# Patient Record
Sex: FEMALE | Race: WHITE | Employment: OTHER | URBAN - METROPOLITAN AREA
[De-identification: names, ages, dates, MRNs, and addresses within clinical notes are randomized per-mention and may not be internally consistent; named-entity substitution may affect disease eponyms.]

---

## 2021-06-03 ENCOUNTER — OFFICE VISIT (OUTPATIENT)
Dept: ONCOLOGY | Age: 66
End: 2021-06-03
Payer: MEDICARE

## 2021-06-03 ENCOUNTER — DOCUMENTATION ONLY (OUTPATIENT)
Dept: ONCOLOGY | Age: 66
End: 2021-06-03

## 2021-06-03 VITALS
SYSTOLIC BLOOD PRESSURE: 100 MMHG | HEART RATE: 82 BPM | DIASTOLIC BLOOD PRESSURE: 67 MMHG | WEIGHT: 152.4 LBS | OXYGEN SATURATION: 98 % | TEMPERATURE: 97 F

## 2021-06-03 DIAGNOSIS — C50.811 MALIGNANT NEOPLASM OF OVERLAPPING SITES OF RIGHT BREAST IN FEMALE, ESTROGEN RECEPTOR POSITIVE (HCC): Primary | ICD-10-CM

## 2021-06-03 DIAGNOSIS — Z17.0 MALIGNANT NEOPLASM OF OVERLAPPING SITES OF RIGHT BREAST IN FEMALE, ESTROGEN RECEPTOR POSITIVE (HCC): Primary | ICD-10-CM

## 2021-06-03 DIAGNOSIS — Z95.828 PORT-A-CATH IN PLACE: Primary | ICD-10-CM

## 2021-06-03 PROCEDURE — 1090F PRES/ABSN URINE INCON ASSESS: CPT | Performed by: INTERNAL MEDICINE

## 2021-06-03 PROCEDURE — G9711 PT HX TOT COL OR COLON CA: HCPCS | Performed by: INTERNAL MEDICINE

## 2021-06-03 PROCEDURE — G8536 NO DOC ELDER MAL SCRN: HCPCS | Performed by: INTERNAL MEDICINE

## 2021-06-03 PROCEDURE — G0463 HOSPITAL OUTPT CLINIC VISIT: HCPCS | Performed by: INTERNAL MEDICINE

## 2021-06-03 PROCEDURE — G8432 DEP SCR NOT DOC, RNG: HCPCS | Performed by: INTERNAL MEDICINE

## 2021-06-03 PROCEDURE — G8427 DOCREV CUR MEDS BY ELIG CLIN: HCPCS | Performed by: INTERNAL MEDICINE

## 2021-06-03 PROCEDURE — G8400 PT W/DXA NO RESULTS DOC: HCPCS | Performed by: INTERNAL MEDICINE

## 2021-06-03 PROCEDURE — 99205 OFFICE O/P NEW HI 60 MIN: CPT | Performed by: INTERNAL MEDICINE

## 2021-06-03 PROCEDURE — 1101F PT FALLS ASSESS-DOCD LE1/YR: CPT | Performed by: INTERNAL MEDICINE

## 2021-06-03 PROCEDURE — G8421 BMI NOT CALCULATED: HCPCS | Performed by: INTERNAL MEDICINE

## 2021-06-03 RX ORDER — SERTRALINE HYDROCHLORIDE 50 MG/1
50 TABLET, FILM COATED ORAL DAILY
COMMUNITY
End: 2021-09-30

## 2021-06-03 RX ORDER — ONDANSETRON 4 MG/1
4-8 TABLET, ORALLY DISINTEGRATING ORAL
Qty: 60 TABLET | Refills: 1 | Status: SHIPPED | OUTPATIENT
Start: 2021-06-03 | End: 2021-09-30

## 2021-06-03 RX ORDER — PROCHLORPERAZINE MALEATE 5 MG
TABLET ORAL
Qty: 30 TABLET | Refills: 1 | Status: SHIPPED | OUTPATIENT
Start: 2021-06-03 | End: 2021-09-30

## 2021-06-03 RX ORDER — LIDOCAINE AND PRILOCAINE 25; 25 MG/G; MG/G
CREAM TOPICAL AS NEEDED
Qty: 30 G | Refills: 0 | Status: SHIPPED | OUTPATIENT
Start: 2021-06-03 | End: 2021-09-30

## 2021-06-03 NOTE — PROGRESS NOTES
6/3/2021 Opportunity to meet with patient during new patient visit. Patient prefers to be called Luz. Encouraged patient to [facility] in [facility], reminded patient that [facility] [employer] are read M-F 8:30-5 by RN and were for non emergency usage. Patient is interested in the [employer], but understands that this is not available at [facility]. That our office uses [employer]. Provided education regarding this system, along with pricing information. Understands that cost of Cold Cap is provided by [facility]. Explained that as she is interested, RN would determine her cap size, and complete paperwork at her office visit today. Patient fitted with Small cap with Small cover. MADISON explained to patient that more information can be obtained by visiting [employer] and watching educational videos, or by calling Lexi directly at (P#[phone]). Patient was also given booklets by William Muñoz LCSW regarding Lexi.

## 2021-06-03 NOTE — PROGRESS NOTES
[facility] [place] at [place] [address] [address], [place]: [phone]  F: [phone]    Reason for Visit:   Otto Dodson is a 77 y.o. female who is seen in consultation at the request of Dr. Emmie Zimmerman for evaluation of breast cancer. Treatment History:   Breast biopsy  Lumpectomy 4/21/21      STAGE:  2 T2N0 ER+ HER2 +  MY RISK negative    History of Present Illness:     Pt seen today for office consult for new breast cancer ER+ HER2+ post lumpectomy. All records at [address] [place] [address] Rd. Initially had abnormal mammo. Here to discuss adjuvant chemo. Hx of colon cancer 2008  Hx of cervical cancer 1983      Pt is healthy overall. No fevers/ chills/ chest pain/ SOB/ nausea/ vomiting/diarrhea/ pain/fatigue    Still having issues with lumpectomy. Did interior design. Pt is interested in integrative oncology. Timing of chemo. Past Medical History:   Diagnosis Date    Cervical cancer ([facility] [address] 1983    Colon cancer ([facility] 2008      History reviewed. No pertinent surgical history. Social History     Tobacco Use    Smoking status: Never Smoker    Smokeless tobacco: Never Used   Substance Use Topics    Alcohol use: Never      Family History   Problem Relation Age of Onset    Cancer Mother     Breast Cancer Mother     Cancer Father     Melanoma Father     Breast Cancer Sister     Colon Cancer Brother     Prostate Cancer Brother      Current Outpatient Medications   Medication Sig    sertraline (ZOLOFT) 50 mg tablet Take 50 mg by mouth daily. No current facility-administered medications for this visit. Allergies   Allergen Reactions    Cipro [Ciprofloxacin Hcl] Nausea and Vomiting        A complete review of systems was obtained, negative except as described above and as reported on ROS sheet scanned into system.      Physical Exam:     Visit Vitals  /67 (BP 1 Location: Left upper arm, BP Patient Position: Sitting)   Pulse 82   Temp 97 °F (36.1 °C) (Temporal)   Wt 152 lb 6.4 oz (69.1 kg)   SpO2 98%     ECOG PS: 0  General: No distress  Eyes: PERRLA, anicteric sclerae  HENT: Atraumatic, wearing mask  Neck: Supple  Respiratory: CTAB, normal respiratory effort  CV: Normal rate, regular rhythm, no murmurs, no peripheral edema  GI: Soft, nontender, nondistended, no masses  MS: Normal gait and station. Digits without clubbing or cyanosis. Skin: No rashes, ecchymoses, or petechiae. Normal temperature, turgor, and texture. Psych: Alert, oriented, appropriate affect, normal judgment/insight  Neuro non focal  Breast RIGHT lumpectomy scar healing with some fullness at site. Results:   No results found for: WBC, HGB, HCT, PLT, MCV, ANEU, HGBPOC, HCTPOC, HGBEXT, HCTEXT, PLTEXT, HGBEXT, HCTEXT, PLTEXT  No results found for: NA, K, CL, CO2, GLU, BUN, CREA, GFRAA, GFRNA, CA, NAPOC, KPOCT, CLPOC, GLUCPOC, IBUN, CREAPOC, ICAI  No results found for: TBILI, ALT, AP, TP, ALB, GLOB      Records reviewed and summarized above. Pathology report(s) reviewed above. Radiology report(s) reviewed above. Assessment:/PLAN     1)  stage 2 RIGHT breast cancer ER+ Her2+ post lumpectomy 4/21/21. All records at [facility]. No records in our system. Records and history reviewed with pt today. Reviewed path and data specifically with pt. Discussed dx, stage and treatment of breast cancer. Pt is still seeing surgery for lumpectomy scar. Discussed adjuvant chemo and hormonal therapy. Pt saw [facility] Dr Jono Thomas. Most interested in taxol/ herceptin weekly + perjeta. Had ECHO and good at [facility]. Set up chemo. Has port. Will need labs at chemo. Wants cold cap          2) hx of colon cancer/ cervical cancer. Did not have chemo. Surveillance. 3) Psychosocial.  Mood good. Coping well. Has good family support. Fu here in a week at chemo. Call if questions    I appreciate the opportunity to participate in Ms. Mila Stacy's care.     Signed By: Francisco Javier Schuster DO

## 2021-06-03 NOTE — PROGRESS NOTES
Pharmacy Note- Chemotherapy Education    Maxine Murillo is a  77 y. o.female  diagnosed with breast cancer here today for  chemotherapy counseling and consent. Ms. Kareen Lucio is being treated with PACLitaxel, trastuzumab +/- pertuzumab. Provided education on PACLItaxel, trastuzumab and premedications - dexamethasone, diphenhydramine and famotidine. Discussed option for SQ and IV administration of HER2 directed therapy and patient is interested in SQ administration if insurance will cover. Side effects of chemotherapy reviewed included s/s infection, anemia, appetite changes, thromboyctopenia, fatigue, hair loss/alopecia, bone pain, skin and nail changes, diarrhea/constipation, infusion reactions and peripheral neuropathy. Patient given ways to manage these side effects and when to contact office. [employer] Handout of medications provided to patient. Ms. Kareen Lucio verbalized understanding of the information presented and all of the patient's questions were answered.     Duncan Crisostomo, PharmD, BCPS, BCOP    For Pharmacy Admin Tracking Only     CPA in place: No   Recommendation Provided To: Patient/Caregiver: 1 via In person     Total # of Interventions Recommended: 1   Total # of Interventions Accepted: 1   Intervention Accepted By: Patient/Caregiver: 1   Time Spent (min): 30

## 2021-06-03 NOTE — PROGRESS NOTES
Weekly Taxol/Herceptin orders entered into [place] using [employer] to start in about a week. Anti-emetics and EMLA sent to pharmacy on file.

## 2021-06-03 NOTE — PROGRESS NOTES
Oncology Social Work  Psychosocial Assessment    Reason for Assessment:      [] Social Work Referral [x] Initial Assessment [] New Diagnosis [] Other    Advance Care Planning:  Patient does not have an advanced medical directive, and did not express interest in completing one today. Sources of Information:    [x]Patient  []Family  []Staff  []Medical Record     Mental Status:    [x]Alert  []Lethargic  []Unresponsive   [] Unable to assess   Oriented to:  [x]Person  [x]Place  [x]Time  [x]Situation      Relationship Status:  []Single  [x]  []Significant Other/Life Partner  []  []  []    Living Circumstances:  []Lives Alone  [x]Family/Significant Other in Household  []Roommates  []Children in the Home  []Paid Caregivers  []Assisted Living Facility/Group Home  []Skilled [address]  []Homeless  []Incarcerated  []Environmental/Care Concerns  []Other:    Employment Status:  []Employed Full-time []Employed Part-time []Homemaker  [x] Retired [] Short-Term Disability [] [facility]  [] Unemployed     Barriers to Learning:    []Language  []Developmental  []Cognitive  []Altered Mental Status  []Visual/Hearing Impairment  []Unable to Read/Write  []Motivational  [] Challenges Understanding Medical Jargon [x]No Barriers Identified      Financial/Legal Concerns:    []Uninsured  []Limited Income/Resources  []Non-Citizen  []Food Insecurity [x]No Concerns Identified   []Other:    Protestant/Spiritual/Existential:  Does patient have any spiritual or Judaism beliefs? [x] Yes [] No  Is the patient involved in a spiritual, enma or Judaism community? [] Yes [x] No  Patient expressing spiritual/existential angst? [] Yes [x] No  Notes: Patient was an active member of [facility], but moved to [place], [place], and has not found a new [place]ma community.       Support System:    Identified Support Person/Group:  [x]Strong  []Fair  []Limited    Coping with Illness:   [x]  Coping Well  [] Challenges Coping with Serious Illness [] Situational Depression [] Situational Anxiety [] Anticipatory Grief  [] Recent Loss [] Caregiver [place]            Narrative:   Met with the patient during her office visit today. The patient is being seen for breast cancer, status post lumpectomy 4/21/2021. The patient has a history of colon cancer in 2008, and cervical cancer in 1983. Patient lives with her second  of 12 years in their home in [place], [place]. She was  to her first  for 25 years, until their eventual divorce, due to his infidelity. She has two children. Her son, daughter-in-law, and 3 grandchildren live in [place]. Her daughter and her fiance live in [place]. 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 Her  is a . The patient is a recovering alcoholic, and participates in [place] [address] meetings regularly. She would like to begin sponsoring women in the community. The patient meditates daily on the dock of her property. 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 She also rides her bike regularly. The patient uses a cancer cookbook, and eats healthy meals. She no longer drinks caffeine. Invited the patient to the upcoming Virtual Support Group meetings, led by this . Patient expressed interest in [facility] [employer]. Explained that [employer] works with another company, [employer]. Patient is interested in the [employer]. Provided education regarding this system, along with pricing information. Explained that if she is interested, nursing will determine her cap size, and complete paperwork. Explained that more information can be obtained by visiting [employer] and watching educational videos, or by calling [employer] directly at (P#[phone]).     Provided the patient with a list of places to obtain wigs, head scarves, mastectomy bras, prosthesis, and other accessories. Provided this 's contact information as well as information regarding the complementary services provided by the [facility], explaining that the center is currently closed due to COVID-19 restrictions. Explained that meditation and yoga are both being offered virtually. Plan:   1. Introduced self and role of this  in the [employer]. 2.  Informed the patient of the [facility] and available resources there. 3.  Continue to meet with the patient when she returns to the clinic for ongoing assessment of the patients adjustment to her diagnosis and treatment. 4.  Ongoing psychosocial support as desired by patient.       Referral:   Complementary therapies referral  Support Groups referral  DME/WIG referral  Majo Gutierres LCSW

## 2021-06-03 NOTE — PROGRESS NOTES
Sohail Wiggins is a 77 y.o. female  Chief Complaint   Patient presents with    New Patient    Breast Cancer    Colon Cancer     1. Have you been to the ER, urgent care clinic since your last visit? Hospitalized since your last visit? No.     2. Have you seen or consulted any other health care providers outside of the [address] [place] [address] since your last visit? Include any pap smears or colon screening. No.      Patient is now fully vaccinated with the [employer] covid-19 vaccine.

## 2021-06-08 ENCOUNTER — DOCUMENTATION ONLY (OUTPATIENT)
Dept: ONCOLOGY | Age: 66
End: 2021-06-08

## 2021-06-08 ENCOUNTER — TELEPHONE (OUTPATIENT)
Dept: ONCOLOGY | Age: 66
End: 2021-06-08

## 2021-06-08 NOTE — TELEPHONE ENCOUNTER
Call from patient, ID verified X 2. States has communicated with Pharmacist and clarified medications, including pre/post chemo medications. Continuing to resolve issue with [employer] that prevents her from logging in. Supplied with the [address] phone number of [phone] if unable to reach resolution. Confirmed that Pharmacist will [facility] her the link to the cooling mitts/booties she had located for patient on [address] [facility] [address] for use during chemo. Updated patient that the [facility] has been updated with a signed prescription form from [place] to complete her enrollment process. Lexi will then ship to patient, reminded that she should not begin chemo without her equipment from [address]. Requested transfer to [facility]  to begin scheduling her chemo appts,  unavailable, will return call. Update to Provider.

## 2021-06-08 NOTE — TELEPHONE ENCOUNTER
Call to the [facility], [phone]. Spoke with Juliana. Juliana confirmed receipt of prescription and supporting documentation, [employer] company will be contacting patient today to arrange shipment. Agreed to expedite process to urgent processing. States shipment should arrive with patient by 6/14/21 at the latest.      Update to Provider/Pharmacy/Scheduling.

## 2021-06-08 NOTE — TELEPHONE ENCOUNTER
Patient called and stated that she would like an update abut getting the information from [facility]. Patient stated that she has 3 medications that our office called in for her and has some questions in regards and would like to get some clarification about them .     Patient would like to speak with Leslee Alarcon directly about the medications if possible    Call back [phone]

## 2021-06-11 ENCOUNTER — PATIENT MESSAGE (OUTPATIENT)
Dept: ONCOLOGY | Age: 66
End: 2021-06-11

## 2021-06-14 ENCOUNTER — TELEPHONE (OUTPATIENT)
Dept: ONCOLOGY | Age: 66
End: 2021-06-14

## 2021-06-14 RX ORDER — ACETAMINOPHEN 325 MG/1
650 TABLET ORAL AS NEEDED
Status: CANCELLED
Start: 2021-06-23

## 2021-06-14 RX ORDER — DIPHENHYDRAMINE HYDROCHLORIDE 50 MG/ML
50 INJECTION, SOLUTION INTRAMUSCULAR; INTRAVENOUS ONCE
Status: CANCELLED
Start: 2021-06-30 | End: 2021-06-30

## 2021-06-14 RX ORDER — EPINEPHRINE 1 MG/ML
0.3 INJECTION, SOLUTION, CONCENTRATE INTRAVENOUS AS NEEDED
Status: CANCELLED | OUTPATIENT
Start: 2021-06-30

## 2021-06-14 RX ORDER — SODIUM CHLORIDE 0.9 % (FLUSH) 0.9 %
10 SYRINGE (ML) INJECTION AS NEEDED
Status: CANCELLED | OUTPATIENT
Start: 2021-06-16

## 2021-06-14 RX ORDER — DIPHENHYDRAMINE HYDROCHLORIDE 50 MG/ML
50 INJECTION, SOLUTION INTRAMUSCULAR; INTRAVENOUS ONCE
Status: CANCELLED
Start: 2021-06-23 | End: 2021-06-23

## 2021-06-14 RX ORDER — HEPARIN 100 UNIT/ML
300-500 SYRINGE INTRAVENOUS AS NEEDED
Status: CANCELLED
Start: 2021-06-16

## 2021-06-14 RX ORDER — EPINEPHRINE 1 MG/ML
0.3 INJECTION, SOLUTION, CONCENTRATE INTRAVENOUS AS NEEDED
Status: CANCELLED | OUTPATIENT
Start: 2021-06-16

## 2021-06-14 RX ORDER — DIPHENHYDRAMINE HYDROCHLORIDE 50 MG/ML
50 INJECTION, SOLUTION INTRAMUSCULAR; INTRAVENOUS AS NEEDED
Status: CANCELLED
Start: 2021-06-23

## 2021-06-14 RX ORDER — SODIUM CHLORIDE 0.9 % (FLUSH) 0.9 %
10 SYRINGE (ML) INJECTION AS NEEDED
Status: CANCELLED | OUTPATIENT
Start: 2021-06-30

## 2021-06-14 RX ORDER — ONDANSETRON 2 MG/ML
8 INJECTION INTRAMUSCULAR; INTRAVENOUS AS NEEDED
Status: CANCELLED | OUTPATIENT
Start: 2021-06-16

## 2021-06-14 RX ORDER — SODIUM CHLORIDE 9 MG/ML
25 INJECTION, SOLUTION INTRAVENOUS CONTINUOUS
Status: CANCELLED | OUTPATIENT
Start: 2021-06-23

## 2021-06-14 RX ORDER — ALBUTEROL SULFATE 0.83 MG/ML
2.5 SOLUTION RESPIRATORY (INHALATION) AS NEEDED
Status: CANCELLED
Start: 2021-06-23

## 2021-06-14 RX ORDER — DEXAMETHASONE SODIUM PHOSPHATE 4 MG/ML
10 INJECTION, SOLUTION INTRA-ARTICULAR; INTRALESIONAL; INTRAMUSCULAR; INTRAVENOUS; SOFT TISSUE ONCE
Status: CANCELLED | OUTPATIENT
Start: 2021-06-23 | End: 2021-06-23

## 2021-06-14 RX ORDER — HEPARIN 100 UNIT/ML
300-500 SYRINGE INTRAVENOUS AS NEEDED
Status: CANCELLED
Start: 2021-06-30

## 2021-06-14 RX ORDER — SODIUM CHLORIDE 9 MG/ML
25 INJECTION, SOLUTION INTRAVENOUS CONTINUOUS
Status: CANCELLED | OUTPATIENT
Start: 2021-06-30

## 2021-06-14 RX ORDER — HEPARIN 100 UNIT/ML
300-500 SYRINGE INTRAVENOUS AS NEEDED
Status: CANCELLED
Start: 2021-06-23

## 2021-06-14 RX ORDER — EPINEPHRINE 1 MG/ML
0.3 INJECTION, SOLUTION, CONCENTRATE INTRAVENOUS AS NEEDED
Status: CANCELLED | OUTPATIENT
Start: 2021-06-23

## 2021-06-14 RX ORDER — DIPHENHYDRAMINE HYDROCHLORIDE 50 MG/ML
25 INJECTION, SOLUTION INTRAMUSCULAR; INTRAVENOUS AS NEEDED
Status: CANCELLED
Start: 2021-06-23

## 2021-06-14 RX ORDER — SODIUM CHLORIDE 9 MG/ML
25 INJECTION, SOLUTION INTRAVENOUS CONTINUOUS
Status: CANCELLED | OUTPATIENT
Start: 2021-06-16

## 2021-06-14 RX ORDER — ALBUTEROL SULFATE 0.83 MG/ML
2.5 SOLUTION RESPIRATORY (INHALATION) AS NEEDED
Status: CANCELLED
Start: 2021-06-30

## 2021-06-14 RX ORDER — DIPHENHYDRAMINE HYDROCHLORIDE 50 MG/ML
25 INJECTION, SOLUTION INTRAMUSCULAR; INTRAVENOUS AS NEEDED
Status: CANCELLED
Start: 2021-06-30

## 2021-06-14 RX ORDER — HYDROCORTISONE SODIUM SUCCINATE 100 MG/2ML
100 INJECTION, POWDER, FOR SOLUTION INTRAMUSCULAR; INTRAVENOUS AS NEEDED
Status: CANCELLED | OUTPATIENT
Start: 2021-06-16

## 2021-06-14 RX ORDER — DIPHENHYDRAMINE HYDROCHLORIDE 50 MG/ML
25 INJECTION, SOLUTION INTRAMUSCULAR; INTRAVENOUS AS NEEDED
Status: CANCELLED
Start: 2021-06-16

## 2021-06-14 RX ORDER — ACETAMINOPHEN 325 MG/1
650 TABLET ORAL AS NEEDED
Status: CANCELLED
Start: 2021-06-16

## 2021-06-14 RX ORDER — HYDROCORTISONE SODIUM SUCCINATE 100 MG/2ML
100 INJECTION, POWDER, FOR SOLUTION INTRAMUSCULAR; INTRAVENOUS AS NEEDED
Status: CANCELLED | OUTPATIENT
Start: 2021-06-30

## 2021-06-14 RX ORDER — DEXAMETHASONE SODIUM PHOSPHATE 4 MG/ML
10 INJECTION, SOLUTION INTRA-ARTICULAR; INTRALESIONAL; INTRAMUSCULAR; INTRAVENOUS; SOFT TISSUE ONCE
Status: CANCELLED | OUTPATIENT
Start: 2021-06-16 | End: 2021-06-16

## 2021-06-14 RX ORDER — HYDROCORTISONE SODIUM SUCCINATE 100 MG/2ML
100 INJECTION, POWDER, FOR SOLUTION INTRAMUSCULAR; INTRAVENOUS AS NEEDED
Status: CANCELLED | OUTPATIENT
Start: 2021-06-23

## 2021-06-14 RX ORDER — DIPHENHYDRAMINE HYDROCHLORIDE 50 MG/ML
50 INJECTION, SOLUTION INTRAMUSCULAR; INTRAVENOUS AS NEEDED
Status: CANCELLED
Start: 2021-06-30

## 2021-06-14 RX ORDER — ONDANSETRON 2 MG/ML
8 INJECTION INTRAMUSCULAR; INTRAVENOUS AS NEEDED
Status: CANCELLED | OUTPATIENT
Start: 2021-06-23

## 2021-06-14 RX ORDER — ONDANSETRON 2 MG/ML
8 INJECTION INTRAMUSCULAR; INTRAVENOUS AS NEEDED
Status: CANCELLED | OUTPATIENT
Start: 2021-06-30

## 2021-06-14 RX ORDER — ACETAMINOPHEN 325 MG/1
650 TABLET ORAL AS NEEDED
Status: CANCELLED
Start: 2021-06-30

## 2021-06-14 RX ORDER — SODIUM CHLORIDE 0.9 % (FLUSH) 0.9 %
10 SYRINGE (ML) INJECTION AS NEEDED
Status: CANCELLED | OUTPATIENT
Start: 2021-06-23

## 2021-06-14 RX ORDER — ALBUTEROL SULFATE 0.83 MG/ML
2.5 SOLUTION RESPIRATORY (INHALATION) AS NEEDED
Status: CANCELLED
Start: 2021-06-16

## 2021-06-14 RX ORDER — SODIUM CHLORIDE 9 MG/ML
10 INJECTION INTRAMUSCULAR; INTRAVENOUS; SUBCUTANEOUS AS NEEDED
Status: CANCELLED | OUTPATIENT
Start: 2021-06-23

## 2021-06-14 RX ORDER — DIPHENHYDRAMINE HYDROCHLORIDE 50 MG/ML
50 INJECTION, SOLUTION INTRAMUSCULAR; INTRAVENOUS ONCE
Status: CANCELLED
Start: 2021-06-16 | End: 2021-06-16

## 2021-06-14 RX ORDER — SODIUM CHLORIDE 9 MG/ML
10 INJECTION INTRAMUSCULAR; INTRAVENOUS; SUBCUTANEOUS AS NEEDED
Status: CANCELLED | OUTPATIENT
Start: 2021-06-30

## 2021-06-14 RX ORDER — DEXAMETHASONE SODIUM PHOSPHATE 4 MG/ML
10 INJECTION, SOLUTION INTRA-ARTICULAR; INTRALESIONAL; INTRAMUSCULAR; INTRAVENOUS; SOFT TISSUE ONCE
Status: CANCELLED | OUTPATIENT
Start: 2021-06-30 | End: 2021-06-30

## 2021-06-14 RX ORDER — SODIUM CHLORIDE 9 MG/ML
10 INJECTION INTRAMUSCULAR; INTRAVENOUS; SUBCUTANEOUS AS NEEDED
Status: CANCELLED | OUTPATIENT
Start: 2021-06-16

## 2021-06-14 RX ORDER — DIPHENHYDRAMINE HYDROCHLORIDE 50 MG/ML
50 INJECTION, SOLUTION INTRAMUSCULAR; INTRAVENOUS AS NEEDED
Status: CANCELLED
Start: 2021-06-16

## 2021-06-14 NOTE — TELEPHONE ENCOUNTER
Patient returned RN call, ID verified X 2. Patient confirmed with RN that she is in receipt of [employer] cooling system, has not received email from RN with Pharmacy recommendations for cooling mitts. [facility] will check Spam folder. [facility] [employer] is functional now. Has independently ordered cooling mitts from [address]. Reviewed appt times for C1 TH regimen, location of [facility], what to bring with her to treatment. Has obtained EMLA and post chemo meds. Will bring items of comfort/entertainment with her to appt. States RIVERA was to contact Dr. Eli Craig at [facility] regarding his recommendations for treatment, including Perjeta. Would like to be informed if this was accomplished and what decision was made regarding medication. Update to Provider.

## 2021-06-14 NOTE — TELEPHONE ENCOUNTER
Call to patient per [facility] request.  Left VM with RN contact info/hours.

## 2021-06-15 NOTE — TELEPHONE ENCOUNTER
Patient returned RN call, ID verified X 2. Confirmed with patient that she had received e mail with Pharmacist's research on cold mitts available for purchase at [employer]. Patient confirmed receipt to her email, states was unable to open link. Explained that since it was a paper with the link written on it, she would need to copy/paste link into browser. Updated RN that [facility] no longer carried dry ice, but that [employer] does. However, patient's reading of safety info on dry ice has left her with many concerns and has decided against usage. Will freeze her hand mitt inserts at home and keep in cooler during [facility] appt. Provider message relayed regarding Bunny Cirri not being prescribed as part of regimen as indication would be outside of NCCN guidelines, will discuss at [facility] [address] on 6/16/21. Reviewed appt times/locations. Understanding verbalized of all.     Update to Provider/Pharmacist

## 2021-06-15 NOTE — TELEPHONE ENCOUNTER
Follow up call to patient, left VM to contact RN at [facility] office by phone or can send [facility], whichever contact method works best for patient. Update to Provider.

## 2021-06-15 NOTE — PROGRESS NOTES
[facility] [place] [facility] [place] [address] [address], [place]: [phone]  F: [phone]    Reason for Visit:   Gudelia Reynolds is a 77 y.o. female who is seen today in office for follow up of Right Breast Cancer here to start adjuvant weekly Taxol/Herceptin. Treatment History:   · Breast biopsy  · Lumpectomy 4/21/21 at [facility]  · Adjuvant weekly Taxol/Herceptin 6/16/21 -     STAGE:  · T2N0 ER+ HER2 +  · MY RISK negative    History of Present Illness: Gudelia Reynolds is a 77 y.o. female seen today in office for follow up of right breast cancer ER+ HER2+ post lumpectomy. All records at [facility]. She is here today to start adjuvant weekly Taxol/Herceptin. She reports that she feels well overall today. Her appetite and energy levels are good. She denies fever, chills, mouth sores, cough, SOB, CP, nausea, vomiting, diarrhea, and constipation. She denies pain today. CBC is good and CMP is still pending today. She is ready to start treatment today, will be using the Cold Cap during treatment. Past Medical History:   Diagnosis Date    Cancer [facility])     cervical and colon    Cervical cancer ([facility] [address] 1983    Colon cancer ([facility] [address] 2008      Past Surgical History:   Procedure Laterality Date    ENDOSCOPY, COLON, DIAGNOSTIC        Social History     Tobacco Use    Smoking status: Never Smoker    Smokeless tobacco: Never Used   Substance Use Topics    Alcohol use: Never      Family History   Problem Relation Age of Onset    Cancer Mother     Breast Cancer Mother     Cancer Father     Melanoma Father     Breast Cancer Sister     Colon Cancer Brother     Prostate Cancer Brother     Heart Disease Mother     Hypertension Sister      Current Outpatient Medications   Medication Sig    sertraline (ZOLOFT) 50 mg tablet Take 50 mg by mouth daily.  ondansetron (ZOFRAN ODT) 4 mg disintegrating tablet Take 1-2 Tablets by mouth every eight (8) hours as needed for Nausea or Vomiting.     prochlorperazine (Compazine) 5 mg tablet Take 1 tab by mouth every 6 hours as needed for nausea or vomiting    lidocaine-prilocaine (EMLA) topical cream Apply  to affected area as needed for Pain.  sertraline (ZOLOFT) 50 mg tablet TAKE ONE TABLET BY MOUTH EVERY DAY    azithromycin (ZITHROMAX) 250 mg tablet As dir    amoxicillin-clavulanate (AUGMENTIN) 875-125 mg per tablet Take 1 Tab by mouth every twelve (12) hours.  ibuprofen (MOTRIN) 800 mg tablet Take 1 Tab by mouth three (3) times daily as needed for Pain.  omeprazole (PRILOSEC) 40 mg capsule Take 1 Cap by mouth daily.  aspirin (ASPIRIN) 325 mg tablet Take 325 mg by mouth daily. No current facility-administered medications for this visit. Facility-Administered Medications Ordered in Other Visits   Medication Dose Route Frequency    0.9% sodium chloride infusion  25 mL/hr IntraVENous CONTINUOUS    trastuzumab-hyaluronidase-oysk (HERCEPTIN HYLECTA) injection 600 mg  600 mg SubCUTAneous ONCE    dexamethasone (PF) (DECADRON) 10 mg/mL injection 10 mg  10 mg IntraVENous ONCE    diphenhydrAMINE (BENADRYL) injection 50 mg  50 mg IntraVENous ONCE    famotidine (PF) (PEPCID) 20 mg in 0.9% sodium chloride 10 mL injection  20 mg IntraVENous ONCE    PACLitaxeL (TAXOL) 142 mg in 0.9% sodium chloride 250 mL, overfill volume 25 mL chemo infusion  80 mg/m2 (Order-Specific) IntraVENous ONCE    sodium chloride (NS) flush 10 mL  10 mL IntraVENous PRN    0.9% sodium chloride injection 10 mL  10 mL IntraVENous PRN    heparin (porcine) pf 300-500 Units  300-500 Units InterCATHeter PRN      Allergies   Allergen Reactions    Cipro [Ciprofloxacin Hcl] Nausea and Vomiting    Ciprofloxacin Nausea and Vomiting      Review of Systems:  A complete review of systems was obtained, negative except as described above and as reported on ROS sheet scanned into system.      Physical Exam:     Visit Vitals  /70 (BP 1 Location: Left upper arm, BP Patient Position: Sitting)   Pulse 74   Temp (!) 96.7 °F (35.9 °C) (Temporal)   Ht 5' 5\" (1.651 m)   Wt 150 lb 12.8 oz (68.4 kg)   SpO2 96%   BMI 25.09 kg/m²     ECOG PS: 0  General: No distress  Eyes: Anicteric sclerae  HENT: Atraumatic, wearing a mask  Neck: Supple  Respiratory: CTAB, normal respiratory effort  CV: Normal rate, regular rhythm, no murmurs, no peripheral edema  GI: Soft, nontender, nondistended, no masses  MS: Normal gait and station. Digits without clubbing or cyanosis. Skin: No rashes, ecchymoses, or petechiae. Normal temperature, turgor, and texture. Port site without redness/swelling  Psych: Alert, oriented, appropriate affect, normal judgment/insight  Breast: Not examined today, last visit: RIGHT lumpectomy scar healing with some fullness at site  Neuro: Non focal    Results:     Lab Results   Component Value Date/Time    WBC 4.8 06/16/2021 08:24 AM    HGB 13.9 06/16/2021 08:24 AM    HCT 42.1 06/16/2021 08:24 AM    PLATELET 759 48/93/0561 08:24 AM    MCV 94.8 06/16/2021 08:24 AM    ABS. NEUTROPHILS 2.7 06/16/2021 08:24 AM    HGB (POC) 14.3 09/20/2016 09:28 AM    HCT (POC) 43.1 09/20/2016 09:28 AM     Lab Results   Component Value Date/Time    Sodium 143 06/16/2021 08:24 AM    Potassium 3.9 06/16/2021 08:24 AM    Chloride 112 (H) 06/16/2021 08:24 AM    CO2 26 06/16/2021 08:24 AM    Glucose 90 06/16/2021 08:24 AM    BUN 13 06/16/2021 08:24 AM    Creatinine 0.70 06/16/2021 08:24 AM    GFR est AA >60 06/16/2021 08:24 AM    GFR est non-AA >60 06/16/2021 08:24 AM    Calcium 9.3 06/16/2021 08:24 AM     Lab Results   Component Value Date/Time    Bilirubin, total 0.3 06/16/2021 08:24 AM    ALT (SGPT) 21 06/16/2021 08:24 AM    Alk. phosphatase 115 06/16/2021 08:24 AM    Protein, total 7.0 06/16/2021 08:24 AM    Albumin 3.9 06/16/2021 08:24 AM    Globulin 3.1 06/16/2021 08:24 AM     All reports from outside facility scanned into media    Records reviewed and summarized above.   Pathology report(s) reviewed above. Radiology report(s) reviewed above. Assessment:/PLAN     1) Stage 2 RIGHT Breast Cancer ER+ Her2+ post Lumpectomy 4/21/21  All records at [address] No records in our system. Records and history reviewed with patient. Reviewed path and data specifically with patient. Discussed dx, stage and treatment of breast cancer. Patient continues to see Surgery for lumpectomy scar. Discussed adjuvant chemo and hormonal therapy. She had previously seen Dr. Best Matthews with [facility]. Most interested in weekly Taxol/Herceptin. She had a pre chemo ECHO on 5/20/21 at [address] that was good with EF 60-65%- scanned into media. She already has a port. She is here today for Day 1 Cycle 1 of weekly Taxol/Herceptin. She is clinically stable today and doing well overall. Labs (CBC and CMP) reviewed. Patient is ready to start treatment today. She will be doing cold cap with chemo. Follow up in 1 week with Day 8 Cycle 1. Patient agrees with plan. 2) Hx of Colon Cancer (2008) and Cervical Cancer (3907)  She did not have chemo. On a course of surveillance. 3) Management of High Risk Medications - Chemotherapy  No toxicities noted as patient is starting chemo today. Labs (CBC and CMP) reviewed today. No dose adjustments needed. Will monitor for side effects. 4) Psychosocial  Mood good. Coping well. She has good family support. SW/NN support as needed. Call if questions. Follow up in 1 week with Day 8 Cycle 1. This patient was seen in conjunction with Whitney Sorensen NP. I personally performed a face to face diagnostic evaluation on this patient. I personally reviewed the history and performed the key points on the exam.   I personally reviewed all points in the assessment and created treatment plan with the patient. Specifically pt seen by me  Doing well overall  Ready for chemo  Labs personally reviewed by me  Reviewed chemo overall  Continue with chemo as planned.      I appreciate the opportunity to participate in Ms. Mila Stacy's care.     Signed By: Alvin Olvera DO

## 2021-06-16 ENCOUNTER — OFFICE VISIT (OUTPATIENT)
Dept: ONCOLOGY | Age: 66
End: 2021-06-16
Payer: MEDICARE

## 2021-06-16 ENCOUNTER — HOSPITAL ENCOUNTER (OUTPATIENT)
Dept: INFUSION THERAPY | Age: 66
Discharge: HOME | End: 2021-06-16
Payer: MEDICARE

## 2021-06-16 ENCOUNTER — DOCUMENTATION ONLY (OUTPATIENT)
Dept: ONCOLOGY | Age: 66
End: 2021-06-16

## 2021-06-16 VITALS
TEMPERATURE: 96.7 F | HEART RATE: 74 BPM | SYSTOLIC BLOOD PRESSURE: 125 MMHG | BODY MASS INDEX: 25.12 KG/M2 | WEIGHT: 150.8 LBS | DIASTOLIC BLOOD PRESSURE: 70 MMHG | OXYGEN SATURATION: 96 % | HEIGHT: 65 IN

## 2021-06-16 VITALS
DIASTOLIC BLOOD PRESSURE: 79 MMHG | RESPIRATION RATE: 18 BRPM | TEMPERATURE: 96.7 F | SYSTOLIC BLOOD PRESSURE: 145 MMHG | HEART RATE: 71 BPM

## 2021-06-16 DIAGNOSIS — Z85.038 HISTORY OF COLON CANCER: ICD-10-CM

## 2021-06-16 DIAGNOSIS — C50.811 MALIGNANT NEOPLASM OF OVERLAPPING SITES OF RIGHT BREAST IN FEMALE, ESTROGEN RECEPTOR POSITIVE (HCC): ICD-10-CM

## 2021-06-16 DIAGNOSIS — Z95.828 PORT-A-CATH IN PLACE: Primary | ICD-10-CM

## 2021-06-16 DIAGNOSIS — Z17.0 MALIGNANT NEOPLASM OF OVERLAPPING SITES OF RIGHT BREAST IN FEMALE, ESTROGEN RECEPTOR POSITIVE (HCC): ICD-10-CM

## 2021-06-16 DIAGNOSIS — Z17.0 MALIGNANT NEOPLASM OF OVERLAPPING SITES OF RIGHT BREAST IN FEMALE, ESTROGEN RECEPTOR POSITIVE (HCC): Primary | ICD-10-CM

## 2021-06-16 DIAGNOSIS — Z51.11 ENCOUNTER FOR ANTINEOPLASTIC CHEMOTHERAPY: ICD-10-CM

## 2021-06-16 DIAGNOSIS — C50.811 MALIGNANT NEOPLASM OF OVERLAPPING SITES OF RIGHT BREAST IN FEMALE, ESTROGEN RECEPTOR POSITIVE (HCC): Primary | ICD-10-CM

## 2021-06-16 DIAGNOSIS — Z85.41 HISTORY OF CERVICAL CANCER: ICD-10-CM

## 2021-06-16 LAB
ALBUMIN SERPL-MCNC: 3.9 G/DL (ref 3.5–5)
ALBUMIN/GLOB SERPL: 1.3 {RATIO} (ref 1.1–2.2)
ALP SERPL-CCNC: 115 U/L (ref 45–117)
ALT SERPL-CCNC: 21 U/L (ref 12–78)
ANION GAP SERPL CALC-SCNC: 5 MMOL/L (ref 5–15)
AST SERPL-CCNC: 20 U/L (ref 15–37)
BASOPHILS # BLD: 0.1 K/UL (ref 0–0.1)
BASOPHILS NFR BLD: 1 % (ref 0–1)
BILIRUB SERPL-MCNC: 0.3 MG/DL (ref 0.2–1)
BUN SERPL-MCNC: 13 MG/DL (ref 6–20)
BUN/CREAT SERPL: 19 (ref 12–20)
CALCIUM SERPL-MCNC: 9.3 MG/DL (ref 8.5–10.1)
CHLORIDE SERPL-SCNC: 112 MMOL/L (ref 97–108)
CO2 SERPL-SCNC: 26 MMOL/L (ref 21–32)
CREAT SERPL-MCNC: 0.7 MG/DL (ref 0.55–1.02)
DIFFERENTIAL METHOD BLD: NORMAL
EOSINOPHIL # BLD: 0.2 K/UL (ref 0–0.4)
EOSINOPHIL NFR BLD: 4 % (ref 0–7)
ERYTHROCYTE [DISTWIDTH] IN BLOOD BY AUTOMATED COUNT: 13 % (ref 11.5–14.5)
GLOBULIN SER CALC-MCNC: 3.1 G/DL (ref 2–4)
GLUCOSE SERPL-MCNC: 90 MG/DL (ref 65–100)
HCT VFR BLD AUTO: 42.1 % (ref 35–47)
HGB BLD-MCNC: 13.9 G/DL (ref 11.5–16)
IMM GRANULOCYTES # BLD AUTO: 0 K/UL (ref 0–0.04)
IMM GRANULOCYTES NFR BLD AUTO: 0 % (ref 0–0.5)
LYMPHOCYTES # BLD: 1.4 K/UL (ref 0.8–3.5)
LYMPHOCYTES NFR BLD: 29 % (ref 12–49)
MCH RBC QN AUTO: 31.3 PG (ref 26–34)
MCHC RBC AUTO-ENTMCNC: 33 G/DL (ref 30–36.5)
MCV RBC AUTO: 94.8 FL (ref 80–99)
MONOCYTES # BLD: 0.5 K/UL (ref 0–1)
MONOCYTES NFR BLD: 10 % (ref 5–13)
NEUTS SEG # BLD: 2.7 K/UL (ref 1.8–8)
NEUTS SEG NFR BLD: 56 % (ref 32–75)
NRBC # BLD: 0 K/UL (ref 0–0.01)
NRBC BLD-RTO: 0 PER 100 WBC
PLATELET # BLD AUTO: 215 K/UL (ref 150–400)
PMV BLD AUTO: 11.3 FL (ref 8.9–12.9)
POTASSIUM SERPL-SCNC: 3.9 MMOL/L (ref 3.5–5.1)
PROT SERPL-MCNC: 7 G/DL (ref 6.4–8.2)
RBC # BLD AUTO: 4.44 M/UL (ref 3.8–5.2)
SODIUM SERPL-SCNC: 143 MMOL/L (ref 136–145)
WBC # BLD AUTO: 4.8 K/UL (ref 3.6–11)

## 2021-06-16 PROCEDURE — 74011000250 HC RX REV CODE- 250: Performed by: INTERNAL MEDICINE

## 2021-06-16 PROCEDURE — G8536 NO DOC ELDER MAL SCRN: HCPCS | Performed by: INTERNAL MEDICINE

## 2021-06-16 PROCEDURE — G8419 CALC BMI OUT NRM PARAM NOF/U: HCPCS | Performed by: INTERNAL MEDICINE

## 2021-06-16 PROCEDURE — G8432 DEP SCR NOT DOC, RNG: HCPCS | Performed by: INTERNAL MEDICINE

## 2021-06-16 PROCEDURE — 74011250636 HC RX REV CODE- 250/636: Performed by: INTERNAL MEDICINE

## 2021-06-16 PROCEDURE — G9711 PT HX TOT COL OR COLON CA: HCPCS | Performed by: INTERNAL MEDICINE

## 2021-06-16 PROCEDURE — 36415 COLL VENOUS BLD VENIPUNCTURE: CPT

## 2021-06-16 PROCEDURE — 1101F PT FALLS ASSESS-DOCD LE1/YR: CPT | Performed by: INTERNAL MEDICINE

## 2021-06-16 PROCEDURE — G8427 DOCREV CUR MEDS BY ELIG CLIN: HCPCS | Performed by: INTERNAL MEDICINE

## 2021-06-16 PROCEDURE — G9899 SCRN MAM PERF RSLTS DOC: HCPCS | Performed by: INTERNAL MEDICINE

## 2021-06-16 PROCEDURE — 77030012965 HC NDL HUBR BBMI -A

## 2021-06-16 PROCEDURE — 99215 OFFICE O/P EST HI 40 MIN: CPT | Performed by: INTERNAL MEDICINE

## 2021-06-16 PROCEDURE — 96375 TX/PRO/DX INJ NEW DRUG ADDON: CPT

## 2021-06-16 PROCEDURE — 96413 CHEMO IV INFUSION 1 HR: CPT

## 2021-06-16 PROCEDURE — 80053 COMPREHEN METABOLIC PANEL: CPT

## 2021-06-16 PROCEDURE — 96401 CHEMO ANTI-NEOPL SQ/IM: CPT

## 2021-06-16 PROCEDURE — 85025 COMPLETE CBC W/AUTO DIFF WBC: CPT

## 2021-06-16 PROCEDURE — 1090F PRES/ABSN URINE INCON ASSESS: CPT | Performed by: INTERNAL MEDICINE

## 2021-06-16 PROCEDURE — G0463 HOSPITAL OUTPT CLINIC VISIT: HCPCS | Performed by: NURSE PRACTITIONER

## 2021-06-16 PROCEDURE — G8400 PT W/DXA NO RESULTS DOC: HCPCS | Performed by: INTERNAL MEDICINE

## 2021-06-16 RX ORDER — DEXAMETHASONE SODIUM PHOSPHATE 10 MG/ML
10 INJECTION INTRAMUSCULAR; INTRAVENOUS ONCE
Status: COMPLETED | OUTPATIENT
Start: 2021-06-16 | End: 2021-06-16

## 2021-06-16 RX ORDER — SODIUM CHLORIDE 0.9 % (FLUSH) 0.9 %
10 SYRINGE (ML) INJECTION AS NEEDED
Status: DISPENSED | OUTPATIENT
Start: 2021-06-16 | End: 2021-06-16

## 2021-06-16 RX ORDER — DIPHENHYDRAMINE HYDROCHLORIDE 50 MG/ML
50 INJECTION, SOLUTION INTRAMUSCULAR; INTRAVENOUS ONCE
Status: COMPLETED | OUTPATIENT
Start: 2021-06-16 | End: 2021-06-16

## 2021-06-16 RX ORDER — HEPARIN 100 UNIT/ML
300-500 SYRINGE INTRAVENOUS AS NEEDED
Status: ACTIVE | OUTPATIENT
Start: 2021-06-16 | End: 2021-06-16

## 2021-06-16 RX ORDER — ONDANSETRON 2 MG/ML
8 INJECTION INTRAMUSCULAR; INTRAVENOUS ONCE
Status: COMPLETED | OUTPATIENT
Start: 2021-06-16 | End: 2021-06-16

## 2021-06-16 RX ORDER — SODIUM CHLORIDE 9 MG/ML
25 INJECTION, SOLUTION INTRAVENOUS CONTINUOUS
Status: DISPENSED | OUTPATIENT
Start: 2021-06-16 | End: 2021-06-16

## 2021-06-16 RX ORDER — SODIUM CHLORIDE 9 MG/ML
10 INJECTION INTRAMUSCULAR; INTRAVENOUS; SUBCUTANEOUS AS NEEDED
Status: ACTIVE | OUTPATIENT
Start: 2021-06-16 | End: 2021-06-16

## 2021-06-16 RX ADMIN — DEXAMETHASONE SODIUM PHOSPHATE 10 MG: 10 INJECTION, SOLUTION INTRAMUSCULAR; INTRAVENOUS at 11:36

## 2021-06-16 RX ADMIN — SODIUM CHLORIDE 10 ML: 9 INJECTION INTRAMUSCULAR; INTRAVENOUS; SUBCUTANEOUS at 11:39

## 2021-06-16 RX ADMIN — ONDANSETRON 8 MG: 2 INJECTION, SOLUTION INTRAMUSCULAR; INTRAVENOUS at 13:42

## 2021-06-16 RX ADMIN — PACLITAXEL 142 MG: 6 INJECTION, SOLUTION INTRAVENOUS at 12:15

## 2021-06-16 RX ADMIN — DIPHENHYDRAMINE HYDROCHLORIDE 50 MG: 50 INJECTION, SOLUTION INTRAMUSCULAR; INTRAVENOUS at 11:38

## 2021-06-16 RX ADMIN — HEPARIN 500 UNITS: 100 SYRINGE at 11:39

## 2021-06-16 RX ADMIN — TRASTUZUMAB AND HYALURONIDASE-OYSK 600 MG: 600; 10000 INJECTION, SOLUTION SUBCUTANEOUS at 11:08

## 2021-06-16 RX ADMIN — Medication 10 ML: at 11:40

## 2021-06-16 RX ADMIN — SODIUM CHLORIDE 25 ML/HR: 900 INJECTION, SOLUTION INTRAVENOUS at 11:32

## 2021-06-16 RX ADMIN — FAMOTIDINE 20 MG: 10 INJECTION INTRAVENOUS at 11:33

## 2021-06-16 NOTE — PROGRESS NOTES
[employer]  Social Work Navigator Encounter     Patient Name:  Avel Jules \"Luz\"Tawanna     Medical History: Right Breast Cancer here to start adjuvant weekly Taxol/Herceptin. Advance Directives: Patient does not have an advanced medical directive, and did not express interest in completing one today. Narrative:   Supportive visit made during the patient's office visit today. Patient will start treatment today. Patient's  was present during this visit as well. The patient recently participated in meditation through the [facility], and has derived great peace and calm from this meditation practice. She brought the meditation with her to use today as well. Patient has been eating and sleeping well, and does not feel anxiety today. Her  has been cooking all organic meals for her, using recipes from a cancer cookbook. Zip Shirts provided to the patient today. Offered continued support to the patient and her family as needed. Barriers to Care:   No barriers to care identified at this time. Plan:  Ongoing psychosocial support as desired by patient. Referral:   No referrals placed yet.    Greg Cardona LCSW

## 2021-06-16 NOTE — PROGRESS NOTES
Ely Jaime is a 77 y.o. female  Chief Complaint   Patient presents with    Chemotherapy    Breast Cancer     1. Have you been to the ER, urgent care clinic since your last visit? Hospitalized since your last visit? No.    2. Have you seen or consulted any other health care providers outside of the [address] [place] [address] since your last visit? Include any pap smears or colon screening. Yes, patient went to the Lymphedema clinic to get checked up for having some fluid between two incisions on her right side.

## 2021-06-16 NOTE — PROGRESS NOTES
[facility] Progress Note    Date: 2021    Name: Nhi Ramirez    MRN: [id number]         : 1955    Ms. Tena Vega Arrived ambulatory and in no distress for C1D1 of Regimen. Assessment was completed, no acute issues at this time, no new complaints voiced, patient was accompanied by  who was very anxious. Port chest wall port accessed without difficulty, labs drawn & sent for processing. Chemotherapy Flowsheet 2021   Cycle C1   Date 2021   Drug / Regimen Taxol/herceptin       Patient proceed to appointment with Dr. Catrina Valdez      Patient brought cold cap for chemo use today. While giving patient pre meds patient began to c/o nausea from the benadryl and being light headed. Explained to patient that it can sometimes occur with benadryl. Patient c/o of upset stomach and more nausea at completion of treatment. Patient began to vomit and order for zofran given. Patient also had a bought of diarrhea. Gave patient more fluids and let rest for about and hour. Flushed port and discharged per protocol. Patient stated she feels slightly better but wants to go home. Ms. Candice Sow vitals were reviewed. Visit Vitals  BP (!) 145/79   Pulse 71   Temp (!) 96.7 °F (35.9 °C)   Resp 18   Breastfeeding No       Lab results were obtained and reviewed. Recent Results (from the past 12 hour(s))   CBC WITH AUTOMATED DIFF    Collection Time: 21  8:24 AM   Result Value Ref Range    WBC 4.8 3.6 - 11.0 K/uL    RBC 4.44 3.80 - 5.20 M/uL    HGB 13.9 11.5 - 16.0 g/dL    HCT 42.1 35.0 - 47.0 %    MCV 94.8 80.0 - 99.0 FL    MCH 31.3 26.0 - 34.0 PG    MCHC 33.0 30.0 - 36.5 g/dL    RDW 13.0 11.5 - 14.5 %    PLATELET 684 953 - 253 K/uL    MPV 11.3 8.9 - 12.9 FL    NRBC 0.0 0  WBC    ABSOLUTE NRBC 0.00 0.00 - 0.01 K/uL    NEUTROPHILS 56 32 - 75 %    LYMPHOCYTES 29 12 - 49 %    MONOCYTES 10 5 - 13 %    EOSINOPHILS 4 0 - 7 %    BASOPHILS 1 0 - 1 %    IMMATURE GRANULOCYTES 0 0.0 - 0.5 %    ABS.  NEUTROPHILS 2.7 1.8 - 8.0 K/UL    ABS. LYMPHOCYTES 1.4 0.8 - 3.5 K/UL    ABS. MONOCYTES 0.5 0.0 - 1.0 K/UL    ABS. EOSINOPHILS 0.2 0.0 - 0.4 K/UL    ABS. BASOPHILS 0.1 0.0 - 0.1 K/UL    ABS. IMM. GRANS. 0.0 0.00 - 0.04 K/UL    DF AUTOMATED     METABOLIC PANEL, COMPREHENSIVE    Collection Time: 06/16/21  8:24 AM   Result Value Ref Range    Sodium 143 136 - 145 mmol/L    Potassium 3.9 3.5 - 5.1 mmol/L    Chloride 112 (H) 97 - 108 mmol/L    CO2 26 21 - 32 mmol/L    Anion gap 5 5 - 15 mmol/L    Glucose 90 65 - 100 mg/dL    BUN 13 6 - 20 MG/DL    Creatinine 0.70 0.55 - 1.02 MG/DL    BUN/Creatinine ratio 19 12 - 20      GFR est AA >60 >60 ml/min/1.73m2    GFR est non-AA >60 >60 ml/min/1.73m2    Calcium 9.3 8.5 - 10.1 MG/DL    Bilirubin, total 0.3 0.2 - 1.0 MG/DL    ALT (SGPT) 21 12 - 78 U/L    AST (SGOT) 20 15 - 37 U/L    Alk.  phosphatase 115 45 - 117 U/L    Protein, total 7.0 6.4 - 8.2 g/dL    Albumin 3.9 3.5 - 5.0 g/dL    Globulin 3.1 2.0 - 4.0 g/dL    A-G Ratio 1.3 1.1 - 2.2         Medications:  Medications Administered     0.9% sodium chloride infusion     Admin Date  06/16/2021 Action  New Bag Dose  25 mL/hr Rate  25 mL/hr Route  IntraVENous Administered By  Stephanie Rubio RN          0.9% sodium chloride injection 10 mL     Admin Date  06/16/2021 Action  Given Dose  10 mL Route  IntraVENous Administered By  Stephanie Rubio RN          dexamethasone (PF) (DECADRON) 10 mg/mL injection 10 mg     Admin Date  06/16/2021 Action  Given Dose  10 mg Route  IntraVENous Administered By  Stephanie Rubio RN          diphenhydrAMINE (BENADRYL) injection 50 mg     Admin Date  06/16/2021 Action  Given Dose  50 mg Route  IntraVENous Administered By  Stephanie Rubio RN          famotidine (PF) (PEPCID) 20 mg in 0.9% sodium chloride 10 mL injection     Admin Date  06/16/2021 Action  Given Dose  20 mg Route  IntraVENous Administered By  Stephanie Rubio RN          heparin (porcine) pf 300-500 Units     Admin Date  06/16/2021 Action  Given Dose  500 Units Route  InterCATHeter Administered By  Sarika Bass, ESTELLA          ondansetron [facility]) injection 8 mg     Admin Date  06/16/2021 Action  Given Dose  8 mg Route  IntraVENous Administered By  Sarika Bass RN          PACLitaxeL (TAXOL) 142 mg in 0.9% sodium chloride 250 mL, overfill volume 25 mL chemo infusion     Admin Date  06/16/2021 Action  New Bag Dose  142 mg Rate  298.7 mL/hr Route  IntraVENous Administered By  Sarika Bass RN          sodium chloride (NS) flush 10 mL     Admin Date  06/16/2021 Action  Given Dose  10 mL Route  IntraVENous Administered By  Sarika Bass RN          trastuzumab-hyaluronidase-oysk (HERCEPTIN HYLECTA) injection 600 mg     Admin Date  06/16/2021 Action  Given Dose  600 mg Route  SubCUTAneous Administered By  Sarika Bass RN                  Ms. Kulwant Varner tolerated treatment well and was discharged from [place] [id number] in stable condition at [facility] de-accessed, flushed & heparinized per protocol.  She is to return on   Future Appointments   Date Time Provider Joseph Workman   6/23/2021  8:00 AM C2 MARTY LONG [facility]   6/23/2021  8:45 AM Mirella Fisher  [address] BS AMB   6/30/2021  8:00 AM C2 [place]ART[place] LONG [facility]   7/7/2021  8:00 AM C2 [place]ART[place] LONG [facility]   7/14/2021  8:00 AM C2 [place]ART[place] [facility]     {June 16, 2021

## 2021-06-18 ENCOUNTER — TELEPHONE (OUTPATIENT)
Dept: ONCOLOGY | Age: 66
End: 2021-06-18

## 2021-06-18 NOTE — TELEPHONE ENCOUNTER
Post chemo call to patient, ID verified X 2. Feels ok today, but states had reaction to Benadryl at chemo appointment. Felt disoriented, inability to move, felt like body was rubber and had difficulty finding words. Had episode of vomiting/diarrhea post Benadryl, pre chemo. Also had vomiting at the end of chemo and received Zofran, which resolved issue. Is very concerned about receiving Benadryl again with chemo. Feels very uncomfortable if to be repeated. Explained unaware of substitute, as chemo medication is highly allergenic, but will review with Pharmacist, also that Benadryl could be made into mini bag for slower infusion to minimize her episode. Is somewhat disappointed as she has been diligent with diet and exercise in prep for beginning chemo and has concerns about treatment going forward d/t Benadryl. Has limited medication history as does not tolerate well, shared her 20 year sobriety. Does not feel as well as yesterday, possibly queasy. Reviewed with patient that nausea was best addressed head on, to take Zofran ODT as prescribed. Compazine as a back up, but with recent HX of Benadryl issue may wish to avoid. Reviewed diet, hydration, how to reach on call if symptoms unmanageable over weekend. Understanding verbalized, plans to try Pepcid for nausea.     Update to Provider/Pharmacist

## 2021-06-21 ENCOUNTER — TELEPHONE (OUTPATIENT)
Dept: ONCOLOGY | Age: 66
End: 2021-06-21

## 2021-06-21 DIAGNOSIS — K12.31 MUCOSITIS DUE TO CHEMOTHERAPY: ICD-10-CM

## 2021-06-21 DIAGNOSIS — Z17.0 MALIGNANT NEOPLASM OF OVERLAPPING SITES OF RIGHT BREAST IN FEMALE, ESTROGEN RECEPTOR POSITIVE (HCC): Primary | ICD-10-CM

## 2021-06-21 DIAGNOSIS — C50.811 MALIGNANT NEOPLASM OF OVERLAPPING SITES OF RIGHT BREAST IN FEMALE, ESTROGEN RECEPTOR POSITIVE (HCC): Primary | ICD-10-CM

## 2021-06-21 RX ORDER — ONDANSETRON 2 MG/ML
8 INJECTION INTRAMUSCULAR; INTRAVENOUS ONCE
Status: CANCELLED
Start: 2021-06-30 | End: 2021-06-30

## 2021-06-21 RX ORDER — CETIRIZINE HCL 10 MG
10 TABLET ORAL ONCE
Status: CANCELLED
Start: 2021-06-30 | End: 2021-06-30

## 2021-06-21 NOTE — TELEPHONE ENCOUNTER
Follow up call to patient, ID verified X 2. Relayed to patient the adjustments in treatment plan. Understands will have Zofran added as pre med and that Zyrtec will be substituted for Benadryl. States had a \"great weekend and feeling so much better. \" Continues warm salt water rinses, but has developed several oral lesions, has 1 cluster of 5 sores together. Advised to continue rinses, avoid whitening toothpastes/mouthwashes, will notify Provider. If prescription called in would like rinse without Benadryl. RN to update patient if medication ordered and what pharmacy sent to, her [facility] cannot compound medications, but nearby [facility]'s can. Update to Provider.

## 2021-06-21 NOTE — TELEPHONE ENCOUNTER
Incoming call from Pharmacist Ira at [facility] [phone]. Maalox for patient's Magic Mouthwash is unavailable, would like to sub Jennifer. Approved by NP Rosa Hernandez. Understanding verbalized by Ira.

## 2021-06-21 NOTE — TELEPHONE ENCOUNTER
Call to [facility] [phone], Elias Nelsonwash phoned to Pharmacist Ira, who accepted prescription. 1867 Follow up call to patient to notify prescription called into [facility]. Supplied location of prescription, patient states that she is there now for . Update to Provider.

## 2021-06-23 ENCOUNTER — OFFICE VISIT (OUTPATIENT)
Dept: ONCOLOGY | Age: 66
End: 2021-06-23
Payer: MEDICARE

## 2021-06-23 ENCOUNTER — HOSPITAL ENCOUNTER (OUTPATIENT)
Dept: INFUSION THERAPY | Age: 66
Discharge: HOME | End: 2021-06-23
Payer: MEDICARE

## 2021-06-23 VITALS
HEART RATE: 71 BPM | SYSTOLIC BLOOD PRESSURE: 150 MMHG | DIASTOLIC BLOOD PRESSURE: 85 MMHG | HEIGHT: 65 IN | BODY MASS INDEX: 24.83 KG/M2 | WEIGHT: 149 LBS | RESPIRATION RATE: 18 BRPM

## 2021-06-23 VITALS
HEART RATE: 83 BPM | SYSTOLIC BLOOD PRESSURE: 153 MMHG | BODY MASS INDEX: 24.83 KG/M2 | HEIGHT: 65 IN | TEMPERATURE: 98.8 F | OXYGEN SATURATION: 97 % | WEIGHT: 149 LBS | DIASTOLIC BLOOD PRESSURE: 93 MMHG | RESPIRATION RATE: 18 BRPM

## 2021-06-23 DIAGNOSIS — Z17.0 MALIGNANT NEOPLASM OF OVERLAPPING SITES OF RIGHT BREAST IN FEMALE, ESTROGEN RECEPTOR POSITIVE (HCC): Primary | ICD-10-CM

## 2021-06-23 DIAGNOSIS — C50.811 MALIGNANT NEOPLASM OF OVERLAPPING SITES OF RIGHT BREAST IN FEMALE, ESTROGEN RECEPTOR POSITIVE (HCC): Primary | ICD-10-CM

## 2021-06-23 DIAGNOSIS — K12.31 MUCOSITIS DUE TO CHEMOTHERAPY: ICD-10-CM

## 2021-06-23 DIAGNOSIS — Z95.828 PORT-A-CATH IN PLACE: ICD-10-CM

## 2021-06-23 DIAGNOSIS — Z09 CHEMOTHERAPY FOLLOW-UP EXAMINATION: ICD-10-CM

## 2021-06-23 DIAGNOSIS — Z85.41 HISTORY OF CERVICAL CANCER: ICD-10-CM

## 2021-06-23 DIAGNOSIS — Z85.038 HISTORY OF COLON CANCER: ICD-10-CM

## 2021-06-23 LAB
BASOPHILS # BLD: 0.1 K/UL (ref 0–0.1)
BASOPHILS NFR BLD: 1 % (ref 0–1)
DIFFERENTIAL METHOD BLD: ABNORMAL
EOSINOPHIL # BLD: 0.3 K/UL (ref 0–0.4)
EOSINOPHIL NFR BLD: 5 % (ref 0–7)
ERYTHROCYTE [DISTWIDTH] IN BLOOD BY AUTOMATED COUNT: 12.9 % (ref 11.5–14.5)
HCT VFR BLD AUTO: 40.1 % (ref 35–47)
HGB BLD-MCNC: 13.3 G/DL (ref 11.5–16)
IMM GRANULOCYTES # BLD AUTO: 0 K/UL (ref 0–0.04)
IMM GRANULOCYTES NFR BLD AUTO: 1 % (ref 0–0.5)
LYMPHOCYTES # BLD: 1.2 K/UL (ref 0.8–3.5)
LYMPHOCYTES NFR BLD: 25 % (ref 12–49)
MCH RBC QN AUTO: 31.5 PG (ref 26–34)
MCHC RBC AUTO-ENTMCNC: 33.2 G/DL (ref 30–36.5)
MCV RBC AUTO: 95 FL (ref 80–99)
MONOCYTES # BLD: 0.4 K/UL (ref 0–1)
MONOCYTES NFR BLD: 8 % (ref 5–13)
NEUTS SEG # BLD: 2.9 K/UL (ref 1.8–8)
NEUTS SEG NFR BLD: 60 % (ref 32–75)
NRBC # BLD: 0 K/UL (ref 0–0.01)
NRBC BLD-RTO: 0 PER 100 WBC
PLATELET # BLD AUTO: 211 K/UL (ref 150–400)
PMV BLD AUTO: 11.1 FL (ref 8.9–12.9)
RBC # BLD AUTO: 4.22 M/UL (ref 3.8–5.2)
WBC # BLD AUTO: 4.8 K/UL (ref 3.6–11)

## 2021-06-23 PROCEDURE — 1090F PRES/ABSN URINE INCON ASSESS: CPT | Performed by: INTERNAL MEDICINE

## 2021-06-23 PROCEDURE — 96413 CHEMO IV INFUSION 1 HR: CPT

## 2021-06-23 PROCEDURE — 96375 TX/PRO/DX INJ NEW DRUG ADDON: CPT

## 2021-06-23 PROCEDURE — 85025 COMPLETE CBC W/AUTO DIFF WBC: CPT

## 2021-06-23 PROCEDURE — G8420 CALC BMI NORM PARAMETERS: HCPCS | Performed by: INTERNAL MEDICINE

## 2021-06-23 PROCEDURE — 1101F PT FALLS ASSESS-DOCD LE1/YR: CPT | Performed by: INTERNAL MEDICINE

## 2021-06-23 PROCEDURE — G8432 DEP SCR NOT DOC, RNG: HCPCS | Performed by: INTERNAL MEDICINE

## 2021-06-23 PROCEDURE — G9711 PT HX TOT COL OR COLON CA: HCPCS | Performed by: INTERNAL MEDICINE

## 2021-06-23 PROCEDURE — 36415 COLL VENOUS BLD VENIPUNCTURE: CPT

## 2021-06-23 PROCEDURE — 74011250636 HC RX REV CODE- 250/636: Performed by: INTERNAL MEDICINE

## 2021-06-23 PROCEDURE — 77030012965 HC NDL HUBR BBMI -A

## 2021-06-23 PROCEDURE — G8400 PT W/DXA NO RESULTS DOC: HCPCS | Performed by: INTERNAL MEDICINE

## 2021-06-23 PROCEDURE — 74011250637 HC RX REV CODE- 250/637: Performed by: INTERNAL MEDICINE

## 2021-06-23 PROCEDURE — G8427 DOCREV CUR MEDS BY ELIG CLIN: HCPCS | Performed by: INTERNAL MEDICINE

## 2021-06-23 PROCEDURE — 74011000250 HC RX REV CODE- 250: Performed by: INTERNAL MEDICINE

## 2021-06-23 PROCEDURE — G9899 SCRN MAM PERF RSLTS DOC: HCPCS | Performed by: INTERNAL MEDICINE

## 2021-06-23 PROCEDURE — G8536 NO DOC ELDER MAL SCRN: HCPCS | Performed by: INTERNAL MEDICINE

## 2021-06-23 PROCEDURE — G0463 HOSPITAL OUTPT CLINIC VISIT: HCPCS | Performed by: NURSE PRACTITIONER

## 2021-06-23 PROCEDURE — 99214 OFFICE O/P EST MOD 30 MIN: CPT | Performed by: INTERNAL MEDICINE

## 2021-06-23 RX ORDER — ONDANSETRON 2 MG/ML
8 INJECTION INTRAMUSCULAR; INTRAVENOUS ONCE
Status: COMPLETED | OUTPATIENT
Start: 2021-06-23 | End: 2021-06-23

## 2021-06-23 RX ORDER — SODIUM CHLORIDE 9 MG/ML
10 INJECTION INTRAMUSCULAR; INTRAVENOUS; SUBCUTANEOUS AS NEEDED
Status: ACTIVE | OUTPATIENT
Start: 2021-06-23 | End: 2021-06-23

## 2021-06-23 RX ORDER — SODIUM CHLORIDE 0.9 % (FLUSH) 0.9 %
10 SYRINGE (ML) INJECTION AS NEEDED
Status: DISPENSED | OUTPATIENT
Start: 2021-06-23 | End: 2021-06-23

## 2021-06-23 RX ORDER — CETIRIZINE HCL 10 MG
10 TABLET ORAL ONCE
Status: COMPLETED | OUTPATIENT
Start: 2021-06-23 | End: 2021-06-23

## 2021-06-23 RX ORDER — HEPARIN 100 UNIT/ML
300-500 SYRINGE INTRAVENOUS AS NEEDED
Status: ACTIVE | OUTPATIENT
Start: 2021-06-23 | End: 2021-06-23

## 2021-06-23 RX ORDER — SODIUM CHLORIDE 9 MG/ML
25 INJECTION, SOLUTION INTRAVENOUS CONTINUOUS
Status: DISPENSED | OUTPATIENT
Start: 2021-06-23 | End: 2021-06-23

## 2021-06-23 RX ORDER — DEXAMETHASONE SODIUM PHOSPHATE 10 MG/ML
10 INJECTION INTRAMUSCULAR; INTRAVENOUS ONCE
Status: COMPLETED | OUTPATIENT
Start: 2021-06-23 | End: 2021-06-23

## 2021-06-23 RX ADMIN — SODIUM CHLORIDE 25 ML/HR: 900 INJECTION, SOLUTION INTRAVENOUS at 09:54

## 2021-06-23 RX ADMIN — PACLITAXEL 142 MG: 6 INJECTION, SOLUTION INTRAVENOUS at 10:41

## 2021-06-23 RX ADMIN — CETIRIZINE HYDROCHLORIDE 10 MG: 10 TABLET, FILM COATED ORAL at 10:13

## 2021-06-23 RX ADMIN — DEXAMETHASONE SODIUM PHOSPHATE 10 MG: 10 INJECTION, SOLUTION INTRAMUSCULAR; INTRAVENOUS at 10:12

## 2021-06-23 RX ADMIN — ONDANSETRON 8 MG: 2 INJECTION, SOLUTION INTRAMUSCULAR; INTRAVENOUS at 10:13

## 2021-06-23 RX ADMIN — FAMOTIDINE 20 MG: 10 INJECTION INTRAVENOUS at 10:01

## 2021-06-23 NOTE — PROGRESS NOTES
[facility] [place] at [address] [place] [address] Stephanie Alvarez, [id number] [place] [address] Road, [facility] [phone]  F: [phone]    Reason for Visit:   Ramonita Gardner is a 77 y.o. female who is seen today in office for follow up of Right Breast Cancer on adjuvant weekly Taxol/Herceptin. Treatment History:   · Breast biopsy  · Lumpectomy 4/21/21 at [facility]  · Adjuvant weekly Taxol/Herceptin 6/16/21 - Current     STAGE:  · T2N0 ER+ HER2 +  · MY RISK negative    History of Present Illness: Ramonita Gardner is a 77 y.o. female seen today in office for follow up of right breast cancer ER+ HER2+ post lumpectomy. All records at [facility]. She started adjuvant weekly Taxol/Herceptin on 6/16/21. She is here today for Day 8 Cycle 1 of weekly Taxol/Herceptin. She reports that she feels well overall today. She tolerated first treatment well overall. She states she had a reaction to the Benadryl pre medication. She also had some mild mouth sores, relieved with Magic Mouthwash and salt water rinses. Her appetite and energy levels are good. She denies fever, chills, cough, SOB, CP, nausea, vomiting, diarrhea, and constipation. She denies pain today. CBC is good today. She is ready for treatment today, will be using the Cold Cap during treatment.      Past Medical History:   Diagnosis Date    Cancer [facility])     cervical and colon    Cervical cancer ([facility] [address] 1983    Colon cancer ([facility] [address] 2008      Past Surgical History:   Procedure Laterality Date    ENDOSCOPY, COLON, DIAGNOSTIC        Social History     Tobacco Use    Smoking status: Never Smoker    Smokeless tobacco: Never Used   Substance Use Topics    Alcohol use: Never      Family History   Problem Relation Age of Onset    Cancer Mother     Breast Cancer Mother     Cancer Father     Melanoma Father     Breast Cancer Sister     Colon Cancer Brother     Prostate Cancer Brother     Heart Disease Mother     Hypertension Sister      Current Outpatient Medications   Medication Sig  aluminum-magnesium hydroxide 200-200 mg/5 mL susp 5 mL, diphenhydrAMINE 12.5 mg/5 mL liqd 12.5 mg, lidocaine 2 % soln 5 mL 5 mL by Swish and Spit route four (4) times daily as needed for Pain. Magic mouth wash   Maalox  Lidocaine 2% viscous     Pharmacy to mix equal portions of ingredients to a total volume as indicated in the dispense amount. Please call in script without Benadryl per patient preference    sertraline (ZOLOFT) 50 mg tablet Take 50 mg by mouth daily.  ondansetron (ZOFRAN ODT) 4 mg disintegrating tablet Take 1-2 Tablets by mouth every eight (8) hours as needed for Nausea or Vomiting.  prochlorperazine (Compazine) 5 mg tablet Take 1 tab by mouth every 6 hours as needed for nausea or vomiting    lidocaine-prilocaine (EMLA) topical cream Apply  to affected area as needed for Pain.  sertraline (ZOLOFT) 50 mg tablet TAKE ONE TABLET BY MOUTH EVERY DAY    azithromycin (ZITHROMAX) 250 mg tablet As dir    amoxicillin-clavulanate (AUGMENTIN) 875-125 mg per tablet Take 1 Tab by mouth every twelve (12) hours.  ibuprofen (MOTRIN) 800 mg tablet Take 1 Tab by mouth three (3) times daily as needed for Pain.  omeprazole (PRILOSEC) 40 mg capsule Take 1 Cap by mouth daily.  aspirin (ASPIRIN) 325 mg tablet Take 325 mg by mouth daily. No current facility-administered medications for this visit.      Facility-Administered Medications Ordered in Other Visits   Medication Dose Route Frequency    ondansetron (ZOFRAN) injection 8 mg  8 mg IntraVENous ONCE    cetirizine (ZYRTEC) tablet 10 mg  10 mg Oral ONCE    0.9% sodium chloride infusion  25 mL/hr IntraVENous CONTINUOUS    dexamethasone (PF) (DECADRON) 10 mg/mL injection 10 mg  10 mg IntraVENous ONCE    famotidine (PF) (PEPCID) 20 mg in 0.9% sodium chloride 10 mL injection  20 mg IntraVENous ONCE    PACLitaxeL (TAXOL) 142 mg in 0.9% sodium chloride 250 mL, overfill volume 25 mL chemo infusion  80 mg/m2 (Order-Specific) IntraVENous ONCE  sodium chloride (NS) flush 10 mL  10 mL IntraVENous PRN    0.9% sodium chloride injection 10 mL  10 mL IntraVENous PRN    heparin (porcine) pf 300-500 Units  300-500 Units InterCATHeter PRN      Allergies   Allergen Reactions    Cipro [Ciprofloxacin Hcl] Nausea and Vomiting    Ciprofloxacin Nausea and Vomiting      Review of Systems:  A complete review of systems was obtained, negative except as described above and as reported on ROS sheet scanned into system. Physical Exam:     Visit Vitals  BP (!) 153/93 (BP 1 Location: Left upper arm, BP Patient Position: Sitting)   Pulse 83   Temp 98.8 °F (37.1 °C) (Temporal)   Resp 18   Ht 5' 5\" (1.651 m)   Wt 149 lb (67.6 kg)   SpO2 97%   BMI 24.79 kg/m²     ECOG PS: 0  General: No distress  Eyes: Anicteric sclerae  HENT: Atraumatic, wearing a mask  Neck: Supple  Respiratory: CTAB, normal respiratory effort  CV: Normal rate, regular rhythm, no murmurs, no peripheral edema  GI: Soft, nontender, nondistended, no masses  MS: Normal gait and station. Digits without clubbing or cyanosis. Skin: No rashes, ecchymoses, or petechiae. Normal temperature, turgor, and texture. Port site without redness/swelling  Psych: Alert, oriented, appropriate affect, normal judgment/insight  Breast: Not examined today  Neuro: Non focal    Results:     Lab Results   Component Value Date/Time    WBC 4.8 06/23/2021 08:10 AM    HGB 13.3 06/23/2021 08:10 AM    HCT 40.1 06/23/2021 08:10 AM    PLATELET 872 89/22/6357 08:10 AM    MCV 95.0 06/23/2021 08:10 AM    ABS.  NEUTROPHILS 2.9 06/23/2021 08:10 AM    HGB (POC) 14.3 09/20/2016 09:28 AM    HCT (POC) 43.1 09/20/2016 09:28 AM     Lab Results   Component Value Date/Time    Sodium 143 06/16/2021 08:24 AM    Potassium 3.9 06/16/2021 08:24 AM    Chloride 112 (H) 06/16/2021 08:24 AM    CO2 26 06/16/2021 08:24 AM    Glucose 90 06/16/2021 08:24 AM    BUN 13 06/16/2021 08:24 AM    Creatinine 0.70 06/16/2021 08:24 AM    GFR est AA >60 06/16/2021 08:24 AM GFR est non-AA >60 06/16/2021 08:24 AM    Calcium 9.3 06/16/2021 08:24 AM     Lab Results   Component Value Date/Time    Bilirubin, total 0.3 06/16/2021 08:24 AM    ALT (SGPT) 21 06/16/2021 08:24 AM    Alk. phosphatase 115 06/16/2021 08:24 AM    Protein, total 7.0 06/16/2021 08:24 AM    Albumin 3.9 06/16/2021 08:24 AM    Globulin 3.1 06/16/2021 08:24 AM     All reports from outside facility scanned into media    Records reviewed and summarized above. Pathology report(s) reviewed above. Radiology report(s) reviewed above. Assessment:/PLAN     1) Stage 2 RIGHT Breast Cancer ER+ Her2+ post Lumpectomy 4/21/21  All records at [facility]. No records in our system. Records and history reviewed with patient. Reviewed path and data specifically with patient. Discussed dx, stage and treatment of breast cancer. Patient continues to see Surgery for lumpectomy scar. Discussed adjuvant chemo and hormonal therapy. She had previously seen Dr. Mika Cabrera with [facility]. Most interested in weekly Taxol/Herceptin. She had a pre chemo ECHO on 5/20/21 at [facility] that was good with EF 60-65%- scanned into media. She already has a port. She started weekly Taxol/Herceptin on 6/16/21. She is here today for Day 8 Cycle 1 of weekly Taxol/Herceptin. She tolerated first treatment well overall - states had reaction to Benadryl. Added Zofran as pre-med for treatment today and changed Benadryl to Zyrtec. She also had very mild mouth sores, relieved with MM and salt water rinses. She is clinically stable today and doing well overall. Labs (CBC) reviewed today. Patient is ready for treatment today. She is doing cold cap with chemo. Follow up in 1 week with Day 15 Cycle 1. Patient agrees with plan. 2) Hx of Colon Cancer (2008) and Cervical Cancer (7493)  She did not have chemo. On a course of surveillance. 3) Chemo Induced Mouth Sores  Mild, Grade 1. Controlled with Magic Mouthwash and salt water rinses as needed.   Will continue to monitor. 4) Management of High Risk Medications - Chemotherapy  Toxicities include Grade 1 Oral Mucositis. Labs (CBC) reviewed today. No dose adjustments needed. Will monitor for side effects. 5) Psychosocial  Mood good. Coping well. She has good family support. SW/NN support as needed. Call if questions. Follow up in 1 week with Day 15 Cycle 1. This patient was seen in conjunction with David Wnag NP. I personally performed a face to face diagnostic evaluation on this patient. I personally reviewed the history and performed the key points on the exam.   I personally reviewed all points in the assessment and created treatment plan with the patient. Specifically pt seen by me  Doing well overall  Tolerated cycle 1 of chemo. Labs personally reviewed by me  Reviewed chemo overall  Continue with chemo as planned. I appreciate the opportunity to participate in Ms. Mila Stacy's care.     Signed By: Avi Mckeon DO

## 2021-06-23 NOTE — PROGRESS NOTES
Graham West is a 77 y.o. female  Chief Complaint   Patient presents with    Chemotherapy    Breast Cancer     1. Have you been to the ER, urgent care clinic since your last visit? Hospitalized since your last visit? No.    2. Have you seen or consulted any other health care providers outside of the [address] [place] [address] since your last visit? Include any pap smears or colon screening.  No.

## 2021-06-23 NOTE — PROGRESS NOTES
[facility] Chemotherapy Progress Note    Date: 2021    Name: Quinton Blum    MRN: [id number]         : 1955      0800 Pt admit to [facility] for Taxol ambulatory in stable condition. Assessment completed. No new concerns voiced. Port with positive blood return. Patient denies SOB, fever, cough, general not feeling well. Patient denies recent exposure to someone who has tested positive for COVID-19. Patient denies having contact with anyone who has a pending COVID test.          Chemotherapy Flowsheet 2021   Cycle C2   Date 2021   Drug / Regimen Taxol   Pre Meds given   Notes given         Ms. Stacy's vitals were reviewed. Patient Vitals for the past 12 hrs:   Pulse Resp BP   21 1312 71  (!) 150/85   21 0806  18 (!) 153/93         Lab results were obtained and reviewed. Recent Results (from the past 12 hour(s))   CBC WITH AUTOMATED DIFF    Collection Time: 21  8:10 AM   Result Value Ref Range    WBC 4.8 3.6 - 11.0 K/uL    RBC 4.22 3.80 - 5.20 M/uL    HGB 13.3 11.5 - 16.0 g/dL    HCT 40.1 35.0 - 47.0 %    MCV 95.0 80.0 - 99.0 FL    MCH 31.5 26.0 - 34.0 PG    MCHC 33.2 30.0 - 36.5 g/dL    RDW 12.9 11.5 - 14.5 %    PLATELET 287 064 - 043 K/uL    MPV 11.1 8.9 - 12.9 FL    NRBC 0.0 0  WBC    ABSOLUTE NRBC 0.00 0.00 - 0.01 K/uL    NEUTROPHILS 60 32 - 75 %    LYMPHOCYTES 25 12 - 49 %    MONOCYTES 8 5 - 13 %    EOSINOPHILS 5 0 - 7 %    BASOPHILS 1 0 - 1 %    IMMATURE GRANULOCYTES 1 (H) 0.0 - 0.5 %    ABS. NEUTROPHILS 2.9 1.8 - 8.0 K/UL    ABS. LYMPHOCYTES 1.2 0.8 - 3.5 K/UL    ABS. MONOCYTES 0.4 0.0 - 1.0 K/UL    ABS. EOSINOPHILS 0.3 0.0 - 0.4 K/UL    ABS. BASOPHILS 0.1 0.0 - 0.1 K/UL    ABS. IMM. GRANS. 0.0 0.00 - 0.04 K/UL    DF AUTOMATED         Pre-medications  were administered as ordered and chemotherapy was initiated.   Medications Administered     0.9% sodium chloride infusion     Admin Date  2021 Action  New Bag Dose  25 mL/hr Rate  25 mL/hr Route  IntraVENous Administered By  Chris Rjaput RN          cetirizine (ZYRTEC) tablet 10 mg     Admin Date  06/23/2021 Action  Given Dose  10 mg Route  Oral Administered By  Chris Rajput RN          dexamethasone (PF) (DECADRON) 10 mg/mL injection 10 mg     Admin Date  06/23/2021 Action  Given Dose  10 mg Route  IntraVENous Administered By  Chris Rajput RN          famotidine (PF) (PEPCID) 20 mg in 0.9% sodium chloride 10 mL injection     Admin Date  06/23/2021 Action  Given Dose  20 mg Route  IntraVENous Administered By  Chris Rajput RN          ondansetron [facility]) injection 8 mg     Admin Date  06/23/2021 Action  Given Dose  8 mg Route  IntraVENous Administered By  Chris Rajput RN          PACLitaxeL (TAXOL) 142 mg in 0.9% sodium chloride 250 mL, overfill volume 25 mL chemo infusion     Admin Date  06/23/2021 Action  New Bag Dose  142 mg Rate  298.7 mL/hr Route  IntraVENous Administered By  Chris Rajput RN                1315 Pt tolerated treatment well. Port maintained positive blood return throughout treatment. Flushed, heparinized and de-accessed per protocol. D/c home ambulatory in no distress.      Future Appointments   Date Time Provider Joseph Workman   6/30/2021  8:00 AM C2 MARTY LONG [facility]   6/30/2021  8:15 AM Shruthi Cm  [address] BS AMB   7/7/2021  8:00 AM C2 MARTY LONG [facility]   7/14/2021  8:00 AM C2 MARTY LONG [facility]   7/21/2021  8:00 AM D1 MARTY LONG [address], RN  June 23, 2021

## 2021-06-30 ENCOUNTER — HOSPITAL ENCOUNTER (OUTPATIENT)
Dept: INFUSION THERAPY | Age: 66
Discharge: HOME | End: 2021-06-30
Payer: MEDICARE

## 2021-06-30 VITALS
RESPIRATION RATE: 18 BRPM | HEART RATE: 73 BPM | BODY MASS INDEX: 24.86 KG/M2 | SYSTOLIC BLOOD PRESSURE: 143 MMHG | WEIGHT: 149.2 LBS | DIASTOLIC BLOOD PRESSURE: 67 MMHG | TEMPERATURE: 97.3 F | HEIGHT: 65 IN

## 2021-06-30 DIAGNOSIS — Z17.0 MALIGNANT NEOPLASM OF OVERLAPPING SITES OF RIGHT BREAST IN FEMALE, ESTROGEN RECEPTOR POSITIVE (HCC): Primary | ICD-10-CM

## 2021-06-30 DIAGNOSIS — C50.811 MALIGNANT NEOPLASM OF OVERLAPPING SITES OF RIGHT BREAST IN FEMALE, ESTROGEN RECEPTOR POSITIVE (HCC): Primary | ICD-10-CM

## 2021-06-30 LAB
BASOPHILS # BLD: 0.1 K/UL (ref 0–0.1)
BASOPHILS NFR BLD: 1 % (ref 0–1)
DIFFERENTIAL METHOD BLD: ABNORMAL
EOSINOPHIL # BLD: 0.3 K/UL (ref 0–0.4)
EOSINOPHIL NFR BLD: 6 % (ref 0–7)
ERYTHROCYTE [DISTWIDTH] IN BLOOD BY AUTOMATED COUNT: 13.3 % (ref 11.5–14.5)
HCT VFR BLD AUTO: 36.9 % (ref 35–47)
HGB BLD-MCNC: 12.3 G/DL (ref 11.5–16)
IMM GRANULOCYTES # BLD AUTO: 0 K/UL (ref 0–0.04)
IMM GRANULOCYTES NFR BLD AUTO: 1 % (ref 0–0.5)
LYMPHOCYTES # BLD: 1.1 K/UL (ref 0.8–3.5)
LYMPHOCYTES NFR BLD: 27 % (ref 12–49)
MCH RBC QN AUTO: 31.8 PG (ref 26–34)
MCHC RBC AUTO-ENTMCNC: 33.3 G/DL (ref 30–36.5)
MCV RBC AUTO: 95.3 FL (ref 80–99)
MONOCYTES # BLD: 0.3 K/UL (ref 0–1)
MONOCYTES NFR BLD: 8 % (ref 5–13)
NEUTS SEG # BLD: 2.5 K/UL (ref 1.8–8)
NEUTS SEG NFR BLD: 57 % (ref 32–75)
NRBC # BLD: 0 K/UL (ref 0–0.01)
NRBC BLD-RTO: 0 PER 100 WBC
PLATELET # BLD AUTO: 219 K/UL (ref 150–400)
PMV BLD AUTO: 11 FL (ref 8.9–12.9)
RBC # BLD AUTO: 3.87 M/UL (ref 3.8–5.2)
WBC # BLD AUTO: 4.3 K/UL (ref 3.6–11)

## 2021-06-30 PROCEDURE — 74011250636 HC RX REV CODE- 250/636: Performed by: INTERNAL MEDICINE

## 2021-06-30 PROCEDURE — 77030012965 HC NDL HUBR BBMI -A

## 2021-06-30 PROCEDURE — 96375 TX/PRO/DX INJ NEW DRUG ADDON: CPT

## 2021-06-30 PROCEDURE — 36415 COLL VENOUS BLD VENIPUNCTURE: CPT

## 2021-06-30 PROCEDURE — 74011000250 HC RX REV CODE- 250: Performed by: INTERNAL MEDICINE

## 2021-06-30 PROCEDURE — 96413 CHEMO IV INFUSION 1 HR: CPT

## 2021-06-30 PROCEDURE — 85025 COMPLETE CBC W/AUTO DIFF WBC: CPT

## 2021-06-30 PROCEDURE — 74011250637 HC RX REV CODE- 250/637: Performed by: INTERNAL MEDICINE

## 2021-06-30 RX ORDER — SODIUM CHLORIDE 9 MG/ML
10 INJECTION INTRAMUSCULAR; INTRAVENOUS; SUBCUTANEOUS AS NEEDED
Status: DISPENSED | OUTPATIENT
Start: 2021-06-30 | End: 2021-06-30

## 2021-06-30 RX ORDER — ONDANSETRON 2 MG/ML
8 INJECTION INTRAMUSCULAR; INTRAVENOUS ONCE
Status: COMPLETED | OUTPATIENT
Start: 2021-06-30 | End: 2021-06-30

## 2021-06-30 RX ORDER — SODIUM CHLORIDE 9 MG/ML
25 INJECTION, SOLUTION INTRAVENOUS CONTINUOUS
Status: DISPENSED | OUTPATIENT
Start: 2021-06-30 | End: 2021-06-30

## 2021-06-30 RX ORDER — CETIRIZINE HCL 10 MG
10 TABLET ORAL ONCE
Status: COMPLETED | OUTPATIENT
Start: 2021-06-30 | End: 2021-06-30

## 2021-06-30 RX ORDER — DEXAMETHASONE SODIUM PHOSPHATE 10 MG/ML
10 INJECTION INTRAMUSCULAR; INTRAVENOUS ONCE
Status: COMPLETED | OUTPATIENT
Start: 2021-06-30 | End: 2021-06-30

## 2021-06-30 RX ORDER — SODIUM CHLORIDE 0.9 % (FLUSH) 0.9 %
10 SYRINGE (ML) INJECTION AS NEEDED
Status: DISPENSED | OUTPATIENT
Start: 2021-06-30 | End: 2021-06-30

## 2021-06-30 RX ORDER — HEPARIN 100 UNIT/ML
300-500 SYRINGE INTRAVENOUS AS NEEDED
Status: ACTIVE | OUTPATIENT
Start: 2021-06-30 | End: 2021-06-30

## 2021-06-30 RX ADMIN — CETIRIZINE HYDROCHLORIDE 10 MG: 10 TABLET, FILM COATED ORAL at 09:37

## 2021-06-30 RX ADMIN — SODIUM CHLORIDE 25 ML/HR: 900 INJECTION, SOLUTION INTRAVENOUS at 08:49

## 2021-06-30 RX ADMIN — Medication 10 ML: at 12:09

## 2021-06-30 RX ADMIN — FAMOTIDINE 20 MG: 10 INJECTION INTRAVENOUS at 09:00

## 2021-06-30 RX ADMIN — ONDANSETRON 8 MG: 2 INJECTION, SOLUTION INTRAMUSCULAR; INTRAVENOUS at 09:05

## 2021-06-30 RX ADMIN — PACLITAXEL 142 MG: 6 INJECTION, SOLUTION INTRAVENOUS at 10:11

## 2021-06-30 RX ADMIN — SODIUM CHLORIDE 10 ML: 9 INJECTION, SOLUTION INTRAMUSCULAR; INTRAVENOUS; SUBCUTANEOUS at 08:22

## 2021-06-30 RX ADMIN — DEXAMETHASONE SODIUM PHOSPHATE 10 MG: 10 INJECTION, SOLUTION INTRAMUSCULAR; INTRAVENOUS at 09:09

## 2021-06-30 RX ADMIN — Medication 500 UNITS: at 12:09

## 2021-06-30 NOTE — PROGRESS NOTES
[facility] VISIT NOTE    0755. Pt arrived at [facility] ambulatory and in no distress for C1D15 Taxol. Assessment completed, pt c/o occasional headaches but otherwise no complaints were voiced. LCW chest port accessed with . 75 in [place] with no difficulty. Positive blood return noted and labs drawn. Labs resulted and are within parameters to treat. Medications received:  NS KVO  Pepcid IV  Zofran IV  Dexamethasone IV  Zyrtec PO  Taxol IV    Tolerated treatment well, no adverse reaction noted. Port de-accessed and flushed per protocol. Positive blood return noted. Patient Vitals for the past 12 hrs:   Temp Pulse Resp BP   06/30/21 1209  73  (!) 143/67   06/30/21 0758 97.3 °F (36.3 °C) 65 18 119/71     Recent Results (from the past 12 hour(s))   CBC WITH AUTOMATED DIFF    Collection Time: 06/30/21  8:10 AM   Result Value Ref Range    WBC 4.3 3.6 - 11.0 K/uL    RBC 3.87 3.80 - 5.20 M/uL    HGB 12.3 11.5 - 16.0 g/dL    HCT 36.9 35.0 - 47.0 %    MCV 95.3 80.0 - 99.0 FL    MCH 31.8 26.0 - 34.0 PG    MCHC 33.3 30.0 - 36.5 g/dL    RDW 13.3 11.5 - 14.5 %    PLATELET 326 181 - 440 K/uL    MPV 11.0 8.9 - 12.9 FL    NRBC 0.0 0  WBC    ABSOLUTE NRBC 0.00 0.00 - 0.01 K/uL    NEUTROPHILS 57 32 - 75 %    LYMPHOCYTES 27 12 - 49 %    MONOCYTES 8 5 - 13 %    EOSINOPHILS 6 0 - 7 %    BASOPHILS 1 0 - 1 %    IMMATURE GRANULOCYTES 1 (H) 0.0 - 0.5 %    ABS. NEUTROPHILS 2.5 1.8 - 8.0 K/UL    ABS. LYMPHOCYTES 1.1 0.8 - 3.5 K/UL    ABS. MONOCYTES 0.3 0.0 - 1.0 K/UL    ABS. EOSINOPHILS 0.3 0.0 - 0.4 K/UL    ABS. BASOPHILS 0.1 0.0 - 0.1 K/UL    ABS. IMM. GRANS. 0.0 0.00 - 0.04 K/UL    DF AUTOMATED       1215. D/C'd from [facility] ambulatory and in no distress accompanied by partner.  Next appointment is 7/7/21 at 8:00 am.

## 2021-07-01 RX ORDER — ALBUTEROL SULFATE 0.83 MG/ML
2.5 SOLUTION RESPIRATORY (INHALATION) AS NEEDED
Status: CANCELLED
Start: 2021-07-07

## 2021-07-01 RX ORDER — DIPHENHYDRAMINE HYDROCHLORIDE 50 MG/ML
50 INJECTION, SOLUTION INTRAMUSCULAR; INTRAVENOUS AS NEEDED
Status: CANCELLED
Start: 2021-07-07

## 2021-07-01 RX ORDER — EPINEPHRINE 1 MG/ML
0.3 INJECTION, SOLUTION, CONCENTRATE INTRAVENOUS AS NEEDED
Status: CANCELLED | OUTPATIENT
Start: 2021-07-07

## 2021-07-01 RX ORDER — ONDANSETRON 2 MG/ML
8 INJECTION INTRAMUSCULAR; INTRAVENOUS AS NEEDED
Status: CANCELLED | OUTPATIENT
Start: 2021-07-07

## 2021-07-01 RX ORDER — HYDROCORTISONE SODIUM SUCCINATE 100 MG/2ML
100 INJECTION, POWDER, FOR SOLUTION INTRAMUSCULAR; INTRAVENOUS AS NEEDED
Status: CANCELLED | OUTPATIENT
Start: 2021-07-07

## 2021-07-01 RX ORDER — ACETAMINOPHEN 325 MG/1
650 TABLET ORAL AS NEEDED
Status: CANCELLED
Start: 2021-07-07

## 2021-07-01 RX ORDER — DIPHENHYDRAMINE HYDROCHLORIDE 50 MG/ML
25 INJECTION, SOLUTION INTRAMUSCULAR; INTRAVENOUS AS NEEDED
Status: CANCELLED
Start: 2021-07-07

## 2021-07-07 ENCOUNTER — OFFICE VISIT (OUTPATIENT)
Dept: ONCOLOGY | Age: 66
End: 2021-07-07
Payer: MEDICARE

## 2021-07-07 ENCOUNTER — HOSPITAL ENCOUNTER (OUTPATIENT)
Dept: INFUSION THERAPY | Age: 66
Discharge: HOME | End: 2021-07-07
Payer: MEDICARE

## 2021-07-07 VITALS
BODY MASS INDEX: 25.09 KG/M2 | TEMPERATURE: 96.8 F | HEIGHT: 65 IN | SYSTOLIC BLOOD PRESSURE: 123 MMHG | HEART RATE: 68 BPM | RESPIRATION RATE: 18 BRPM | WEIGHT: 150.6 LBS | DIASTOLIC BLOOD PRESSURE: 82 MMHG

## 2021-07-07 VITALS
SYSTOLIC BLOOD PRESSURE: 118 MMHG | WEIGHT: 150.6 LBS | DIASTOLIC BLOOD PRESSURE: 64 MMHG | BODY MASS INDEX: 25.09 KG/M2 | TEMPERATURE: 96.8 F | OXYGEN SATURATION: 98 % | HEIGHT: 65 IN | HEART RATE: 69 BPM

## 2021-07-07 DIAGNOSIS — C50.811 MALIGNANT NEOPLASM OF OVERLAPPING SITES OF RIGHT BREAST IN FEMALE, ESTROGEN RECEPTOR POSITIVE (HCC): Primary | ICD-10-CM

## 2021-07-07 DIAGNOSIS — Z85.038 HISTORY OF COLON CANCER: ICD-10-CM

## 2021-07-07 DIAGNOSIS — Z17.0 MALIGNANT NEOPLASM OF OVERLAPPING SITES OF RIGHT BREAST IN FEMALE, ESTROGEN RECEPTOR POSITIVE (HCC): Primary | ICD-10-CM

## 2021-07-07 DIAGNOSIS — Z85.41 HISTORY OF CERVICAL CANCER: ICD-10-CM

## 2021-07-07 DIAGNOSIS — K12.31 MUCOSITIS DUE TO CHEMOTHERAPY: ICD-10-CM

## 2021-07-07 DIAGNOSIS — Z09 CHEMOTHERAPY FOLLOW-UP EXAMINATION: ICD-10-CM

## 2021-07-07 DIAGNOSIS — Z95.828 PORT-A-CATH IN PLACE: ICD-10-CM

## 2021-07-07 LAB
ALBUMIN SERPL-MCNC: 3.6 G/DL (ref 3.5–5)
ALBUMIN/GLOB SERPL: 1.2 {RATIO} (ref 1.1–2.2)
ALP SERPL-CCNC: 88 U/L (ref 45–117)
ALT SERPL-CCNC: 24 U/L (ref 12–78)
ANION GAP SERPL CALC-SCNC: 5 MMOL/L (ref 5–15)
AST SERPL-CCNC: 17 U/L (ref 15–37)
BASOPHILS # BLD: 0.1 K/UL (ref 0–0.1)
BASOPHILS NFR BLD: 1 % (ref 0–1)
BILIRUB SERPL-MCNC: 0.3 MG/DL (ref 0.2–1)
BUN SERPL-MCNC: 14 MG/DL (ref 6–20)
BUN/CREAT SERPL: 20 (ref 12–20)
CALCIUM SERPL-MCNC: 8.7 MG/DL (ref 8.5–10.1)
CHLORIDE SERPL-SCNC: 110 MMOL/L (ref 97–108)
CO2 SERPL-SCNC: 27 MMOL/L (ref 21–32)
CREAT SERPL-MCNC: 0.71 MG/DL (ref 0.55–1.02)
DIFFERENTIAL METHOD BLD: ABNORMAL
EOSINOPHIL # BLD: 0.4 K/UL (ref 0–0.4)
EOSINOPHIL NFR BLD: 9 % (ref 0–7)
ERYTHROCYTE [DISTWIDTH] IN BLOOD BY AUTOMATED COUNT: 13.4 % (ref 11.5–14.5)
GLOBULIN SER CALC-MCNC: 3.1 G/DL (ref 2–4)
GLUCOSE SERPL-MCNC: 97 MG/DL (ref 65–100)
HCT VFR BLD AUTO: 38.2 % (ref 35–47)
HGB BLD-MCNC: 12.3 G/DL (ref 11.5–16)
IMM GRANULOCYTES # BLD AUTO: 0 K/UL (ref 0–0.04)
IMM GRANULOCYTES NFR BLD AUTO: 1 % (ref 0–0.5)
LYMPHOCYTES # BLD: 1.3 K/UL (ref 0.8–3.5)
LYMPHOCYTES NFR BLD: 26 % (ref 12–49)
MCH RBC QN AUTO: 30.8 PG (ref 26–34)
MCHC RBC AUTO-ENTMCNC: 32.2 G/DL (ref 30–36.5)
MCV RBC AUTO: 95.7 FL (ref 80–99)
MONOCYTES # BLD: 0.4 K/UL (ref 0–1)
MONOCYTES NFR BLD: 7 % (ref 5–13)
NEUTS SEG # BLD: 2.7 K/UL (ref 1.8–8)
NEUTS SEG NFR BLD: 56 % (ref 32–75)
NRBC # BLD: 0 K/UL (ref 0–0.01)
NRBC BLD-RTO: 0 PER 100 WBC
PLATELET # BLD AUTO: 245 K/UL (ref 150–400)
PMV BLD AUTO: 11.1 FL (ref 8.9–12.9)
POTASSIUM SERPL-SCNC: 4 MMOL/L (ref 3.5–5.1)
PROT SERPL-MCNC: 6.7 G/DL (ref 6.4–8.2)
RBC # BLD AUTO: 3.99 M/UL (ref 3.8–5.2)
SODIUM SERPL-SCNC: 142 MMOL/L (ref 136–145)
WBC # BLD AUTO: 4.9 K/UL (ref 3.6–11)

## 2021-07-07 PROCEDURE — 85025 COMPLETE CBC W/AUTO DIFF WBC: CPT

## 2021-07-07 PROCEDURE — 74011250636 HC RX REV CODE- 250/636: Performed by: INTERNAL MEDICINE

## 2021-07-07 PROCEDURE — 1090F PRES/ABSN URINE INCON ASSESS: CPT | Performed by: INTERNAL MEDICINE

## 2021-07-07 PROCEDURE — G9899 SCRN MAM PERF RSLTS DOC: HCPCS | Performed by: INTERNAL MEDICINE

## 2021-07-07 PROCEDURE — 74011000250 HC RX REV CODE- 250: Performed by: INTERNAL MEDICINE

## 2021-07-07 PROCEDURE — G9711 PT HX TOT COL OR COLON CA: HCPCS | Performed by: INTERNAL MEDICINE

## 2021-07-07 PROCEDURE — 36415 COLL VENOUS BLD VENIPUNCTURE: CPT

## 2021-07-07 PROCEDURE — G0463 HOSPITAL OUTPT CLINIC VISIT: HCPCS | Performed by: NURSE PRACTITIONER

## 2021-07-07 PROCEDURE — 74011250637 HC RX REV CODE- 250/637: Performed by: INTERNAL MEDICINE

## 2021-07-07 PROCEDURE — G8536 NO DOC ELDER MAL SCRN: HCPCS | Performed by: INTERNAL MEDICINE

## 2021-07-07 PROCEDURE — G8427 DOCREV CUR MEDS BY ELIG CLIN: HCPCS | Performed by: INTERNAL MEDICINE

## 2021-07-07 PROCEDURE — 96375 TX/PRO/DX INJ NEW DRUG ADDON: CPT

## 2021-07-07 PROCEDURE — 80053 COMPREHEN METABOLIC PANEL: CPT

## 2021-07-07 PROCEDURE — 96413 CHEMO IV INFUSION 1 HR: CPT

## 2021-07-07 PROCEDURE — 1101F PT FALLS ASSESS-DOCD LE1/YR: CPT | Performed by: INTERNAL MEDICINE

## 2021-07-07 PROCEDURE — G8510 SCR DEP NEG, NO PLAN REQD: HCPCS | Performed by: INTERNAL MEDICINE

## 2021-07-07 PROCEDURE — G8419 CALC BMI OUT NRM PARAM NOF/U: HCPCS | Performed by: INTERNAL MEDICINE

## 2021-07-07 PROCEDURE — 77030012965 HC NDL HUBR BBMI -A

## 2021-07-07 PROCEDURE — G8400 PT W/DXA NO RESULTS DOC: HCPCS | Performed by: INTERNAL MEDICINE

## 2021-07-07 PROCEDURE — 96401 CHEMO ANTI-NEOPL SQ/IM: CPT

## 2021-07-07 PROCEDURE — 99214 OFFICE O/P EST MOD 30 MIN: CPT | Performed by: INTERNAL MEDICINE

## 2021-07-07 RX ORDER — CETIRIZINE HCL 10 MG
10 TABLET ORAL ONCE
Status: COMPLETED | OUTPATIENT
Start: 2021-07-07 | End: 2021-07-07

## 2021-07-07 RX ORDER — HEPARIN 100 UNIT/ML
300-500 SYRINGE INTRAVENOUS AS NEEDED
Status: ACTIVE | OUTPATIENT
Start: 2021-07-07 | End: 2021-07-07

## 2021-07-07 RX ORDER — SODIUM CHLORIDE 0.9 % (FLUSH) 0.9 %
10 SYRINGE (ML) INJECTION AS NEEDED
Status: DISPENSED | OUTPATIENT
Start: 2021-07-07 | End: 2021-07-07

## 2021-07-07 RX ORDER — SODIUM CHLORIDE 9 MG/ML
25 INJECTION, SOLUTION INTRAVENOUS CONTINUOUS
Status: DISPENSED | OUTPATIENT
Start: 2021-07-07 | End: 2021-07-07

## 2021-07-07 RX ORDER — ONDANSETRON 2 MG/ML
8 INJECTION INTRAMUSCULAR; INTRAVENOUS ONCE
Status: COMPLETED | OUTPATIENT
Start: 2021-07-07 | End: 2021-07-07

## 2021-07-07 RX ORDER — DEXAMETHASONE SODIUM PHOSPHATE 10 MG/ML
10 INJECTION INTRAMUSCULAR; INTRAVENOUS ONCE
Status: COMPLETED | OUTPATIENT
Start: 2021-07-07 | End: 2021-07-07

## 2021-07-07 RX ORDER — SODIUM CHLORIDE 9 MG/ML
10 INJECTION INTRAMUSCULAR; INTRAVENOUS; SUBCUTANEOUS AS NEEDED
Status: ACTIVE | OUTPATIENT
Start: 2021-07-07 | End: 2021-07-07

## 2021-07-07 RX ADMIN — SODIUM CHLORIDE 25 ML/HR: 900 INJECTION, SOLUTION INTRAVENOUS at 09:42

## 2021-07-07 RX ADMIN — ONDANSETRON 8 MG: 2 INJECTION, SOLUTION INTRAMUSCULAR; INTRAVENOUS at 09:43

## 2021-07-07 RX ADMIN — Medication 10 ML: at 08:17

## 2021-07-07 RX ADMIN — CETIRIZINE HYDROCHLORIDE 10 MG: 10 TABLET, FILM COATED ORAL at 09:54

## 2021-07-07 RX ADMIN — TRASTUZUMAB AND HYALURONIDASE-OYSK 600 MG: 600; 10000 INJECTION, SOLUTION SUBCUTANEOUS at 10:15

## 2021-07-07 RX ADMIN — FAMOTIDINE 20 MG: 10 INJECTION INTRAVENOUS at 09:48

## 2021-07-07 RX ADMIN — Medication 10 ML: at 09:42

## 2021-07-07 RX ADMIN — SODIUM CHLORIDE 10 ML: 9 INJECTION INTRAMUSCULAR; INTRAVENOUS; SUBCUTANEOUS at 08:17

## 2021-07-07 RX ADMIN — HEPARIN 500 UNITS: 100 SYRINGE at 12:33

## 2021-07-07 RX ADMIN — Medication 10 ML: at 12:33

## 2021-07-07 RX ADMIN — PACLITAXEL 142 MG: 6 INJECTION, SOLUTION INTRAVENOUS at 10:31

## 2021-07-07 RX ADMIN — DEXAMETHASONE SODIUM PHOSPHATE 10 MG: 10 INJECTION, SOLUTION INTRAMUSCULAR; INTRAVENOUS at 09:46

## 2021-07-07 NOTE — PROGRESS NOTES
[facility] Chemo Progress Note    Date: 2021    Name: Thalia Savage    MRN: [id number]         : 1955    0805 Ms. Ke Garcia Arrived to [facility] for  Cycle 2 Day 1 Taxol/Herceptin Hylecta ambulatory in stable condition. Assessment was completed, no acute issues at this time, no new complaints voiced. Port accessed with positive blood return. Labs drawn and sent for processing. Port flushed and capped for MD appointment. 7140 Patient and  upstairs to MD appointment. [address] flushed with positive blood return and NS started at [facility]. Patient had cold cap applied. Chemotherapy Flowsheet 2021   Cycle C2D1   Date 2021   Drug / Regimen Taxol/Herceptin SQ   Pre Meds given   Notes given         Patient denies SOB, fever, cough, general not feeling well. Patient denies recent exposure to someone who has tested positive for COVID-19. Patient denies having contact with anyone who has a pending COVID test.      Ms. Stacy's vitals were reviewed. Patient Vitals for the past 12 hrs:   Temp Pulse Resp BP   21 1230  68 18 123/82   21 0805 96.8 °F (36 °C) 70 18 118/64         Lab results were obtained and reviewed. Recent Results (from the past 12 hour(s))   CBC WITH AUTOMATED DIFF    Collection Time: 21  8:17 AM   Result Value Ref Range    WBC 4.9 3.6 - 11.0 K/uL    RBC 3.99 3.80 - 5.20 M/uL    HGB 12.3 11.5 - 16.0 g/dL    HCT 38.2 35.0 - 47.0 %    MCV 95.7 80.0 - 99.0 FL    MCH 30.8 26.0 - 34.0 PG    MCHC 32.2 30.0 - 36.5 g/dL    RDW 13.4 11.5 - 14.5 %    PLATELET 283 156 - 695 K/uL    MPV 11.1 8.9 - 12.9 FL    NRBC 0.0 0  WBC    ABSOLUTE NRBC 0.00 0.00 - 0.01 K/uL    NEUTROPHILS 56 32 - 75 %    LYMPHOCYTES 26 12 - 49 %    MONOCYTES 7 5 - 13 %    EOSINOPHILS 9 (H) 0 - 7 %    BASOPHILS 1 0 - 1 %    IMMATURE GRANULOCYTES 1 (H) 0.0 - 0.5 %    ABS. NEUTROPHILS 2.7 1.8 - 8.0 K/UL    ABS. LYMPHOCYTES 1.3 0.8 - 3.5 K/UL    ABS. MONOCYTES 0.4 0.0 - 1.0 K/UL    ABS.  EOSINOPHILS 0.4 0.0 - 0.4 K/UL    ABS. BASOPHILS 0.1 0.0 - 0.1 K/UL    ABS. IMM. GRANS. 0.0 0.00 - 0.04 K/UL    DF AUTOMATED     METABOLIC PANEL, COMPREHENSIVE    Collection Time: 07/07/21  8:17 AM   Result Value Ref Range    Sodium 142 136 - 145 mmol/L    Potassium 4.0 3.5 - 5.1 mmol/L    Chloride 110 (H) 97 - 108 mmol/L    CO2 27 21 - 32 mmol/L    Anion gap 5 5 - 15 mmol/L    Glucose 97 65 - 100 mg/dL    BUN 14 6 - 20 MG/DL    Creatinine 0.71 0.55 - 1.02 MG/DL    BUN/Creatinine ratio 20 12 - 20      GFR est AA >60 >60 ml/min/1.73m2    GFR est non-AA >60 >60 ml/min/1.73m2    Calcium 8.7 8.5 - 10.1 MG/DL    Bilirubin, total 0.3 0.2 - 1.0 MG/DL    ALT (SGPT) 24 12 - 78 U/L    AST (SGOT) 17 15 - 37 U/L    Alk. phosphatase 88 45 - 117 U/L    Protein, total 6.7 6.4 - 8.2 g/dL    Albumin 3.6 3.5 - 5.0 g/dL    Globulin 3.1 2.0 - 4.0 g/dL    A-G Ratio 1.2 1.1 - 2.2         Pre-medications  were administered as ordered and chemotherapy was initiated.   Medications Administered     0.9% sodium chloride infusion     Admin Date  07/07/2021 Action  New Bag Dose  25 mL/hr Rate  25 mL/hr Route  IntraVENous Administered By  Yonny Stoddard RN          0.9% sodium chloride injection 10 mL     Admin Date  07/07/2021 Action  Given Dose  10 mL Route  IntraVENous Administered By  Yonny Stoddard RN          cetirizine (ZYRTEC) tablet 10 mg     Admin Date  07/07/2021 Action  Given Dose  10 mg Route  Oral Administered By  Yonny Stoddard RN          dexamethasone (PF) (DECADRON) 10 mg/mL injection 10 mg     Admin Date  07/07/2021 Action  Given Dose  10 mg Route  IntraVENous Administered By  Yonny Stoddard RN          famotidine (PF) (PEPCID) 20 mg in 0.9% sodium chloride 10 mL injection     Admin Date  07/07/2021 Action  Given Dose  20 mg Route  IntraVENous Administered By  Yonny Stoddard RN          heparin (porcine) pf 300-500 Units     Admin Date  07/07/2021 Action  Given Dose  500 Units Route  InterCATHeter Administered By  Yonny Stoddard RN ondansetron (ZOFRAN) injection 8 mg     Admin Date  07/07/2021 Action  Given Dose  8 mg Route  IntraVENous Administered By  Ashley Hou RN          PACLitaxeL (TAXOL) 142 mg in 0.9% sodium chloride 250 mL, overfill volume 25 mL chemo infusion     Admin Date  07/07/2021 Action  New Bag Dose  142 mg Rate  298.7 mL/hr Route  IntraVENous Administered By  Ashley Hou RN          sodium chloride (NS) flush 10 mL     Admin Date  07/07/2021 Action  Given Dose  10 mL Route  IntraVENous Administered By  Ashley Hou RN           Admin Date  07/07/2021 Action  Given Dose  10 mL Route  IntraVENous Administered By  Ashley Hou RN           Admin Date  07/07/2021 Action  Given Dose  10 mL Route  IntraVENous Administered By  Ashley Hou RN          trastuzumab-hyaluronidase-oysk (HERCEPTIN HYLECTA) injection 600 mg     Admin Date  07/07/2021 Action  Given Dose  600 mg Route  SubCUTAneous Administered By  Ashley Hou RN                  9383 Patient tolerated treatment well. Port maintained positive blood return throughout treatment. Port flushed, heparinized and de accessed per protocol. Patient was discharged from [facility] in stable condition.      Future Appointments   Date Time Provider Joseph Workman   7/14/2021  8:00 AM C2 MARTY LONG [place] [facility]   7/21/2021  8:00 AM D1 MARTY LONG [address] [place] [facility]   7/21/2021  8:15 AM Kenny Robertson  [address] BS AMB   7/28/2021  9:00 AM D3 MARTY LONG [address] [facility]   8/4/2021  8:00 AM D1 MARTY LONG [address] Gabrielle Cavazos RN  July 7, 2021

## 2021-07-07 NOTE — PROGRESS NOTES
[facility] at [address], [id number] [place] [address] Road, [facility] [phone]  F: [phone]    Reason for Visit:   Eva Elizabeth is a 77 y.o. female who is seen today in office for follow up of Right Breast Cancer on adjuvant weekly Taxol/Herceptin. Treatment History:   · Breast biopsy  · Lumpectomy 4/21/21 at [facility]  · Adjuvant weekly Taxol/Herceptin 6/16/21 - Current     STAGE:  · T2N0 ER+ HER2 +  · MYRISK negative    History of Present Illness: Eva Elizabeth is a 77 y.o. female seen today in office for follow up of right breast cancer ER+ HER2+ post lumpectomy. All records at [facility]. She started adjuvant weekly Taxol/Herceptin on 6/16/21. She is here today for Day 1 Cycle 2 (Week 4) of weekly Taxol/Herceptin. She reports that she feels well overall today. She is tolerating treatment well overall with some joint/muscle aches and mild mouth sores. She has tried taking Tylenol which hasn't helped much. She states that exercising helps. Mouth sores, relieved with Magic Mouthwash and salt water rinses. Her appetite is good and energy levels are lower overall. She denies fever, chills, cough, SOB, CP, nausea, vomiting, diarrhea, and constipation. CBC and CMP are still pending today. She is ready for treatment today, is using the Cold Cap during treatment.      Past Medical History:   Diagnosis Date    Cancer [facility])     cervical and colon    Cervical cancer ([facility] [address] 1983    Colon cancer ([facility] [address] 2008      Past Surgical History:   Procedure Laterality Date    ENDOSCOPY, COLON, DIAGNOSTIC        Social History     Tobacco Use    Smoking status: Never Smoker    Smokeless tobacco: Never Used   Substance Use Topics    Alcohol use: Never      Family History   Problem Relation Age of Onset    Cancer Mother     Breast Cancer Mother     Cancer Father     Melanoma Father     Breast Cancer Sister     Colon Cancer Brother     Prostate Cancer Brother     Heart Disease Mother     Hypertension Sister      Current Outpatient Medications   Medication Sig    aluminum-magnesium hydroxide 200-200 mg/5 mL susp 5 mL, diphenhydrAMINE 12.5 mg/5 mL liqd 12.5 mg, lidocaine 2 % soln 5 mL 5 mL by Swish and Spit route four (4) times daily as needed for Pain. Magic mouth wash   Maalox  Lidocaine 2% viscous     Pharmacy to mix equal portions of ingredients to a total volume as indicated in the dispense amount. Please call in script without Benadryl per patient preference    sertraline (ZOLOFT) 50 mg tablet Take 50 mg by mouth daily.  ondansetron (ZOFRAN ODT) 4 mg disintegrating tablet Take 1-2 Tablets by mouth every eight (8) hours as needed for Nausea or Vomiting.  prochlorperazine (Compazine) 5 mg tablet Take 1 tab by mouth every 6 hours as needed for nausea or vomiting    lidocaine-prilocaine (EMLA) topical cream Apply  to affected area as needed for Pain.  sertraline (ZOLOFT) 50 mg tablet TAKE ONE TABLET BY MOUTH EVERY DAY    azithromycin (ZITHROMAX) 250 mg tablet As dir    amoxicillin-clavulanate (AUGMENTIN) 875-125 mg per tablet Take 1 Tab by mouth every twelve (12) hours.  ibuprofen (MOTRIN) 800 mg tablet Take 1 Tab by mouth three (3) times daily as needed for Pain.  omeprazole (PRILOSEC) 40 mg capsule Take 1 Cap by mouth daily.  aspirin (ASPIRIN) 325 mg tablet Take 325 mg by mouth daily. No current facility-administered medications for this visit. Allergies   Allergen Reactions    Cipro [Ciprofloxacin Hcl] Nausea and Vomiting    Ciprofloxacin Nausea and Vomiting      Review of Systems:  A complete review of systems was obtained, negative except as described above and as reported on ROS sheet scanned into system.      Physical Exam:     Visit Vitals  /64 (BP 1 Location: Left upper arm, BP Patient Position: Sitting)   Pulse 69   Temp 96.8 °F (36 °C) (Temporal)   Ht 5' 5\" (1.651 m)   Wt 150 lb 9.6 oz (68.3 kg)   SpO2 98%   BMI 25.06 kg/m²     ECOG PS: 0  General: No distress  Eyes: Anicteric sclerae  HENT: Atraumatic, wearing a mask  Neck: Supple  Respiratory: CTAB, normal respiratory effort  CV: Normal rate, regular rhythm, no murmurs, no peripheral edema  GI: Soft, nontender, nondistended, no masses  MS: Normal gait and station. Digits without clubbing or cyanosis. Skin: No rashes, ecchymoses, or petechiae. Normal temperature, turgor, and texture. Port site without redness/swelling  Psych: Alert, oriented, appropriate affect, normal judgment/insight  Breast: Not examined today  Neuro: Non focal    Results:     Lab Results   Component Value Date/Time    WBC 4.9 07/07/2021 08:17 AM    HGB 12.3 07/07/2021 08:17 AM    HCT 38.2 07/07/2021 08:17 AM    PLATELET 168 12/84/1986 08:17 AM    MCV 95.7 07/07/2021 08:17 AM    ABS. NEUTROPHILS 2.7 07/07/2021 08:17 AM    HGB (POC) 14.3 09/20/2016 09:28 AM    HCT (POC) 43.1 09/20/2016 09:28 AM     Lab Results   Component Value Date/Time    Sodium 143 06/16/2021 08:24 AM    Potassium 3.9 06/16/2021 08:24 AM    Chloride 112 (H) 06/16/2021 08:24 AM    CO2 26 06/16/2021 08:24 AM    Glucose 90 06/16/2021 08:24 AM    BUN 13 06/16/2021 08:24 AM    Creatinine 0.70 06/16/2021 08:24 AM    GFR est AA >60 06/16/2021 08:24 AM    GFR est non-AA >60 06/16/2021 08:24 AM    Calcium 9.3 06/16/2021 08:24 AM     Lab Results   Component Value Date/Time    Bilirubin, total 0.3 06/16/2021 08:24 AM    ALT (SGPT) 21 06/16/2021 08:24 AM    Alk. phosphatase 115 06/16/2021 08:24 AM    Protein, total 7.0 06/16/2021 08:24 AM    Albumin 3.9 06/16/2021 08:24 AM    Globulin 3.1 06/16/2021 08:24 AM     All reports from outside facility scanned into media    Records reviewed and summarized above. Pathology report(s) reviewed above. Radiology report(s) reviewed above. Assessment:/PLAN     1) Stage 2 RIGHT Breast Cancer ER+ Her2+ post Lumpectomy 4/21/21  All records at [facility]. No records in our system. Records and history reviewed with patient.  Reviewed path and data specifically with patient. Discussed dx, stage and treatment of breast cancer. Patient continues to see Surgery for lumpectomy scar. Discussed adjuvant chemo and hormonal therapy. She had previously seen Dr. Torrey Hussein with [facility]. Most interested in weekly Taxol/Herceptin. She had a pre chemo ECHO on 5/20/21 at [facility] that was good with EF 60-65%- scanned into media. She already has a port. She started weekly Taxol/Herceptin on 6/16/21. She is here today for Day 1 Cycle 2 (Week 4) of weekly Taxol/Herceptin. She is tolerating chemo well overall with some joint/muscle aches and mild mouth sores. She is clinically stable today and doing well overall. Labs (CBC and CMP) reviewed today. Patient is ready for treatment today. She is doing cold cap with chemo. Next ECHO due by 8/20/21. Follow [facility] in 1 week in [facility]. Follow up in 2 weeks in [facility]/office. Patient agrees with plan. 2) Hx of Colon Cancer (2008) and Cervical Cancer (7625)  She did not have chemo. On a course of surveillance. 3) Chemo Induced Mouth Sores  Mild, Grade 1. Controlled with Magic Mouthwash and salt water rinses as needed. Will continue to monitor. 4) Management of High Risk Medications - Chemotherapy  Toxicities include Grade 1 Oral Mucositis and Grade 1 Arthralgia. Labs (CBC and CMP) reviewed today. No dose adjustments needed today. Will monitor for side effects. 5) Psychosocial  Mood good. Coping well. She has good family support. SW/NN support as needed. Call if questions. Follow up in 1 week in [facility] and 2 weeks in [facility]office. This patient was seen in conjunction with Yandy Bob NP. I personally performed a face to face diagnostic evaluation on this patient. I personally reviewed the history and performed the key points on the exam.   I personally reviewed all points in the assessment and created treatment plan with the patient.     Specifically pt seen by me  Doing well overall  Tolerating chemo well. Labs personally reviewed by me  Reviewed chemo overall  Continue with chemo as planned. I appreciate the opportunity to participate in Ms. Mila Stacy's care.     Signed By: Cari Odom, DO

## 2021-07-07 NOTE — PROGRESS NOTES
Megan Estrada is a 77 y.o. female  Chief Complaint   Patient presents with    Chemotherapy    Breast Cancer     1. Have you been to the ER, urgent care clinic since your last visit? Hospitalized since your last visit? No.    2. Have you seen or consulted any other health care providers outside of the [address] [place] [address] since your last visit? Include any pap smears or colon screening. No.    Patient states she has been having red bumps under her elbow and states they get itchy and when she scratches it hurts a little bit. Patient has a red bump on her left leg on her shin, has been having some dry blood or \"scabby\" things coming out of her nose when she blows it.

## 2021-07-09 RX ORDER — HYDROCORTISONE SODIUM SUCCINATE 100 MG/2ML
100 INJECTION, POWDER, FOR SOLUTION INTRAMUSCULAR; INTRAVENOUS AS NEEDED
Status: CANCELLED | OUTPATIENT
Start: 2021-07-14

## 2021-07-09 RX ORDER — ACETAMINOPHEN 325 MG/1
650 TABLET ORAL AS NEEDED
Status: CANCELLED
Start: 2021-07-14

## 2021-07-09 RX ORDER — EPINEPHRINE 1 MG/ML
0.3 INJECTION, SOLUTION, CONCENTRATE INTRAVENOUS AS NEEDED
Status: CANCELLED | OUTPATIENT
Start: 2021-07-14

## 2021-07-09 RX ORDER — DIPHENHYDRAMINE HYDROCHLORIDE 50 MG/ML
50 INJECTION, SOLUTION INTRAMUSCULAR; INTRAVENOUS AS NEEDED
Status: CANCELLED
Start: 2021-07-14

## 2021-07-09 RX ORDER — DIPHENHYDRAMINE HYDROCHLORIDE 50 MG/ML
25 INJECTION, SOLUTION INTRAMUSCULAR; INTRAVENOUS AS NEEDED
Status: CANCELLED
Start: 2021-07-14

## 2021-07-09 RX ORDER — ALBUTEROL SULFATE 0.83 MG/ML
2.5 SOLUTION RESPIRATORY (INHALATION) AS NEEDED
Status: CANCELLED
Start: 2021-07-14

## 2021-07-09 RX ORDER — ONDANSETRON 2 MG/ML
8 INJECTION INTRAMUSCULAR; INTRAVENOUS AS NEEDED
Status: CANCELLED | OUTPATIENT
Start: 2021-07-14

## 2021-07-09 RX ORDER — SODIUM CHLORIDE 9 MG/ML
10 INJECTION INTRAMUSCULAR; INTRAVENOUS; SUBCUTANEOUS AS NEEDED
Status: CANCELLED | OUTPATIENT
Start: 2021-07-14

## 2021-07-14 ENCOUNTER — HOSPITAL ENCOUNTER (OUTPATIENT)
Dept: INFUSION THERAPY | Age: 66
Discharge: HOME | End: 2021-07-14
Payer: MEDICARE

## 2021-07-14 ENCOUNTER — DOCUMENTATION ONLY (OUTPATIENT)
Dept: ONCOLOGY | Age: 66
End: 2021-07-14

## 2021-07-14 VITALS
BODY MASS INDEX: 25.33 KG/M2 | SYSTOLIC BLOOD PRESSURE: 159 MMHG | TEMPERATURE: 96.9 F | HEIGHT: 65 IN | HEART RATE: 81 BPM | DIASTOLIC BLOOD PRESSURE: 81 MMHG | RESPIRATION RATE: 16 BRPM | WEIGHT: 152 LBS

## 2021-07-14 DIAGNOSIS — Z17.0 MALIGNANT NEOPLASM OF OVERLAPPING SITES OF RIGHT BREAST IN FEMALE, ESTROGEN RECEPTOR POSITIVE (HCC): Primary | ICD-10-CM

## 2021-07-14 DIAGNOSIS — C50.811 MALIGNANT NEOPLASM OF OVERLAPPING SITES OF RIGHT BREAST IN FEMALE, ESTROGEN RECEPTOR POSITIVE (HCC): Primary | ICD-10-CM

## 2021-07-14 LAB
APPEARANCE UR: ABNORMAL
BACTERIA URNS QL MICRO: NEGATIVE /HPF
BASOPHILS # BLD: 0.1 K/UL (ref 0–0.1)
BASOPHILS NFR BLD: 1 % (ref 0–1)
BILIRUB UR QL CFM: NEGATIVE
COLOR UR: ABNORMAL
DIFFERENTIAL METHOD BLD: ABNORMAL
EOSINOPHIL # BLD: 0.3 K/UL (ref 0–0.4)
EOSINOPHIL NFR BLD: 6 % (ref 0–7)
EPITH CASTS URNS QL MICRO: ABNORMAL /LPF
ERYTHROCYTE [DISTWIDTH] IN BLOOD BY AUTOMATED COUNT: 13.5 % (ref 11.5–14.5)
GLUCOSE UR STRIP.AUTO-MCNC: NEGATIVE MG/DL
HCT VFR BLD AUTO: 37.6 % (ref 35–47)
HGB BLD-MCNC: 12.2 G/DL (ref 11.5–16)
HGB UR QL STRIP: NEGATIVE
IMM GRANULOCYTES # BLD AUTO: 0 K/UL (ref 0–0.04)
IMM GRANULOCYTES NFR BLD AUTO: 1 % (ref 0–0.5)
KETONES UR QL STRIP.AUTO: ABNORMAL MG/DL
LEUKOCYTE ESTERASE UR QL STRIP.AUTO: ABNORMAL
LYMPHOCYTES # BLD: 1.2 K/UL (ref 0.8–3.5)
LYMPHOCYTES NFR BLD: 26 % (ref 12–49)
MCH RBC QN AUTO: 31.4 PG (ref 26–34)
MCHC RBC AUTO-ENTMCNC: 32.4 G/DL (ref 30–36.5)
MCV RBC AUTO: 96.7 FL (ref 80–99)
MONOCYTES # BLD: 0.4 K/UL (ref 0–1)
MONOCYTES NFR BLD: 8 % (ref 5–13)
NEUTS SEG # BLD: 2.8 K/UL (ref 1.8–8)
NEUTS SEG NFR BLD: 58 % (ref 32–75)
NITRITE UR QL STRIP.AUTO: POSITIVE
NRBC # BLD: 0 K/UL (ref 0–0.01)
NRBC BLD-RTO: 0 PER 100 WBC
PH UR STRIP: 5 [PH] (ref 5–8)
PLATELET # BLD AUTO: 246 K/UL (ref 150–400)
PMV BLD AUTO: 11 FL (ref 8.9–12.9)
PROT UR STRIP-MCNC: NEGATIVE MG/DL
RBC # BLD AUTO: 3.89 M/UL (ref 3.8–5.2)
RBC #/AREA URNS HPF: ABNORMAL /HPF (ref 0–5)
SP GR UR REFRACTOMETRY: 1.02 (ref 1–1.03)
UA: UC IF INDICATED,UAUC: ABNORMAL
UROBILINOGEN UR QL STRIP.AUTO: 1 EU/DL (ref 0.2–1)
WBC # BLD AUTO: 4.8 K/UL (ref 3.6–11)
WBC URNS QL MICRO: ABNORMAL /HPF (ref 0–4)

## 2021-07-14 PROCEDURE — 85025 COMPLETE CBC W/AUTO DIFF WBC: CPT

## 2021-07-14 PROCEDURE — 96375 TX/PRO/DX INJ NEW DRUG ADDON: CPT

## 2021-07-14 PROCEDURE — 74011000250 HC RX REV CODE- 250: Performed by: INTERNAL MEDICINE

## 2021-07-14 PROCEDURE — 36415 COLL VENOUS BLD VENIPUNCTURE: CPT

## 2021-07-14 PROCEDURE — 74011250636 HC RX REV CODE- 250/636: Performed by: INTERNAL MEDICINE

## 2021-07-14 PROCEDURE — 77030012965 HC NDL HUBR BBMI -A

## 2021-07-14 PROCEDURE — 74011250637 HC RX REV CODE- 250/637: Performed by: INTERNAL MEDICINE

## 2021-07-14 PROCEDURE — 96413 CHEMO IV INFUSION 1 HR: CPT

## 2021-07-14 PROCEDURE — 81001 URINALYSIS AUTO W/SCOPE: CPT

## 2021-07-14 RX ORDER — CETIRIZINE HCL 10 MG
10 TABLET ORAL ONCE
Status: COMPLETED | OUTPATIENT
Start: 2021-07-14 | End: 2021-07-14

## 2021-07-14 RX ORDER — DEXAMETHASONE SODIUM PHOSPHATE 10 MG/ML
10 INJECTION INTRAMUSCULAR; INTRAVENOUS ONCE
Status: COMPLETED | OUTPATIENT
Start: 2021-07-14 | End: 2021-07-14

## 2021-07-14 RX ORDER — SODIUM CHLORIDE 0.9 % (FLUSH) 0.9 %
10 SYRINGE (ML) INJECTION AS NEEDED
Status: DISPENSED | OUTPATIENT
Start: 2021-07-14 | End: 2021-07-14

## 2021-07-14 RX ORDER — ONDANSETRON 2 MG/ML
8 INJECTION INTRAMUSCULAR; INTRAVENOUS ONCE
Status: COMPLETED | OUTPATIENT
Start: 2021-07-14 | End: 2021-07-14

## 2021-07-14 RX ORDER — HEPARIN 100 UNIT/ML
300-500 SYRINGE INTRAVENOUS AS NEEDED
Status: ACTIVE | OUTPATIENT
Start: 2021-07-14 | End: 2021-07-14

## 2021-07-14 RX ORDER — SODIUM CHLORIDE 9 MG/ML
25 INJECTION, SOLUTION INTRAVENOUS CONTINUOUS
Status: DISPENSED | OUTPATIENT
Start: 2021-07-14 | End: 2021-07-14

## 2021-07-14 RX ADMIN — Medication 500 UNITS: at 12:45

## 2021-07-14 RX ADMIN — DEXAMETHASONE SODIUM PHOSPHATE 10 MG: 10 INJECTION, SOLUTION INTRAMUSCULAR; INTRAVENOUS at 09:37

## 2021-07-14 RX ADMIN — SODIUM CHLORIDE 25 ML/HR: 900 INJECTION, SOLUTION INTRAVENOUS at 09:30

## 2021-07-14 RX ADMIN — FAMOTIDINE 20 MG: 10 INJECTION, SOLUTION INTRAVENOUS at 09:39

## 2021-07-14 RX ADMIN — ONDANSETRON 8 MG: 2 INJECTION, SOLUTION INTRAMUSCULAR; INTRAVENOUS at 09:36

## 2021-07-14 RX ADMIN — CETIRIZINE HYDROCHLORIDE 10 MG: 10 TABLET, FILM COATED ORAL at 09:43

## 2021-07-14 RX ADMIN — PACLITAXEL 142 MG: 6 INJECTION, SOLUTION INTRAVENOUS at 10:22

## 2021-07-14 RX ADMIN — Medication 10 ML: at 12:45

## 2021-07-14 NOTE — PROGRESS NOTES
[facility] Chemo Progress Note  0800 Ms. Og Wiggins Arrived to [facility] for  Taxol (C2D8) ambulatory in stable condition. Assessment was completed, no acute issues at this time, patient reports burning and increased frequent urination. Port accessed with positive blood return. Labs drawn and sent for processing. Port flushed and capped. [address] [address] for treatment. Medications ordered from pharmacy. Chemotherapy Flowsheet 7/14/2021   Cycle C2D8   Date 7/14/2021   Drug / Regimen Taxol   Pre Meds given   Notes given       Ms. Stacy's vitals were reviewed. Patient Vitals for the past 12 hrs:   Temp Pulse Resp BP   07/14/21 1244  81  (!) 159/81   07/14/21 0802 96.9 °F (36.1 °C) 65 16 125/66       Lab results were obtained and reviewed. Recent Results (from the past 12 hour(s))   CBC WITH AUTOMATED DIFF    Collection Time: 07/14/21  8:22 AM   Result Value Ref Range    WBC 4.8 3.6 - 11.0 K/uL    RBC 3.89 3.80 - 5.20 M/uL    HGB 12.2 11.5 - 16.0 g/dL    HCT 37.6 35.0 - 47.0 %    MCV 96.7 80.0 - 99.0 FL    MCH 31.4 26.0 - 34.0 PG    MCHC 32.4 30.0 - 36.5 g/dL    RDW 13.5 11.5 - 14.5 %    PLATELET 530 648 - 279 K/uL    MPV 11.0 8.9 - 12.9 FL    NRBC 0.0 0  WBC    ABSOLUTE NRBC 0.00 0.00 - 0.01 K/uL    NEUTROPHILS 58 32 - 75 %    LYMPHOCYTES 26 12 - 49 %    MONOCYTES 8 5 - 13 %    EOSINOPHILS 6 0 - 7 %    BASOPHILS 1 0 - 1 %    IMMATURE GRANULOCYTES 1 (H) 0.0 - 0.5 %    ABS. NEUTROPHILS 2.8 1.8 - 8.0 K/UL    ABS. LYMPHOCYTES 1.2 0.8 - 3.5 K/UL    ABS. MONOCYTES 0.4 0.0 - 1.0 K/UL    ABS. EOSINOPHILS 0.3 0.0 - 0.4 K/UL    ABS. BASOPHILS 0.1 0.0 - 0.1 K/UL    ABS. IMM.  GRANS. 0.0 0.00 - 0.04 K/UL    DF AUTOMATED     URINALYSIS W/ REFLEX CULTURE    Collection Time: 07/14/21  8:47 AM    Specimen: Miscellaneous sample; Urine    Urine specimen   Result Value Ref Range    Color DARK YELLOW      Appearance TURBID (A) CLEAR      Specific gravity 1.020 1.003 - 1.030      pH (UA) 5.0 5.0 - 8.0      Protein Negative NEG mg/dL Glucose Negative NEG mg/dL    Ketone TRACE (A) NEG mg/dL    Blood Negative NEG      Urobilinogen 1.0 0.2 - 1.0 EU/dL    Nitrites Positive (A) NEG      Leukocyte Esterase SMALL (A) NEG      WBC 0-4 0 - 4 /hpf    RBC 0-5 0 - 5 /hpf    Epithelial cells MODERATE (A) FEW /lpf    Bacteria Negative NEG /hpf    UA:UC IF INDICATED CULTURE NOT INDICATED BY UA RESULT CNI     BILIRUBIN, CONFIRM    Collection Time: 07/14/21  8:47 AM   Result Value Ref Range    Bilirubin UA, confirm Negative NEG         Pre-medications  were administered as ordered and chemotherapy was initiated.   Medications Administered     0.9% sodium chloride infusion     Admin Date  07/14/2021 Action  New Bag Dose  25 mL/hr Rate  25 mL/hr Route  IntraVENous Administered By  Rochelle Lee RN          cetirizine (ZYRTEC) tablet 10 mg     Admin Date  07/14/2021 Action  Given Dose  10 mg Route  Oral Administered By  Rochelle Lee RN          dexamethasone (PF) (DECADRON) 10 mg/mL injection 10 mg     Admin Date  07/14/2021 Action  Given Dose  10 mg Route  IntraVENous Administered By  Rochelle Lee RN          famotidine (PF) (PEPCID) 20 mg in 0.9% sodium chloride 10 mL injection     Admin Date  07/14/2021 Action  Given Dose  20 mg Route  IntraVENous Administered By  Rochelle Lee RN          heparin (porcine) pf 300-500 Units     Admin Date  07/14/2021 Action  Given Dose  500 Units Route  InterCATHeter Administered By  Rochelle Lee RN          ondansetron [facility]) injection 8 mg     Admin Date  07/14/2021 Action  Given Dose  8 mg Route  IntraVENous Administered By  Rochelle Lee RN          PACLitaxeL (TAXOL) 142 mg in 0.9% sodium chloride 250 mL, overfill volume 25 mL chemo infusion     Admin Date  07/14/2021 Action  New Bag Dose  142 mg Rate  298.7 mL/hr Route  IntraVENous Administered By  Rochelle Lee RN          sodium chloride (NS) flush 10 mL     Admin Date  07/14/2021 Action  Given Dose  10 mL Route  IntraVENous Administered By  Rochelle Lee RN [phone] Patient tolerated treatment well. Port maintained positive blood return throughout treatment. Port flushed, heparinized and de accessed per protocol. Patient was discharged from [facility] in stable condition. Patient is aware of next appointment.      Future Appointments   Date Time Provider Joseph Jacinta   7/21/2021  8:00 AM D1 MARTY LONG [address] H   7/21/2021  8:15 AM Madalyn Adair  [address] BS AMB   7/28/2021  9:00 AM D3 MARTY LONG [address] H   8/4/2021  8:00 AM D1 MARTY LONG [address]   8/11/2021  8:00 AM C2 MARTY LONG Raj Healy [address], RN  July 14, 2021

## 2021-07-14 NOTE — PROGRESS NOTES
Received message from [email or web address] that patient is complaining of burning with urination. She has been taking OTC Azo without much relief. UA with reflex ordered to be done in [facility] today.

## 2021-07-14 NOTE — PROGRESS NOTES
Please let patient know that UA did not show any infection/does not need antibiotics. Encourage patient to drink 64-80 oz water/day.

## 2021-07-14 NOTE — PROGRESS NOTES
[facility] Verified. Called pt on mobile phone number. Patient was made aware of Urinalysis results/report. Was advised to drink 64-80oz per day! Patient was appreciative over call!   Jacob Lambert

## 2021-07-15 RX ORDER — DIPHENHYDRAMINE HYDROCHLORIDE 50 MG/ML
50 INJECTION, SOLUTION INTRAMUSCULAR; INTRAVENOUS AS NEEDED
Status: CANCELLED
Start: 2021-07-21

## 2021-07-15 RX ORDER — ALBUTEROL SULFATE 0.83 MG/ML
2.5 SOLUTION RESPIRATORY (INHALATION) AS NEEDED
Status: CANCELLED
Start: 2021-07-21

## 2021-07-15 RX ORDER — DIPHENHYDRAMINE HYDROCHLORIDE 50 MG/ML
25 INJECTION, SOLUTION INTRAMUSCULAR; INTRAVENOUS AS NEEDED
Status: CANCELLED
Start: 2021-07-21

## 2021-07-15 RX ORDER — HYDROCORTISONE SODIUM SUCCINATE 100 MG/2ML
100 INJECTION, POWDER, FOR SOLUTION INTRAMUSCULAR; INTRAVENOUS AS NEEDED
Status: CANCELLED | OUTPATIENT
Start: 2021-07-21

## 2021-07-15 RX ORDER — EPINEPHRINE 1 MG/ML
0.3 INJECTION, SOLUTION, CONCENTRATE INTRAVENOUS AS NEEDED
Status: CANCELLED | OUTPATIENT
Start: 2021-07-21

## 2021-07-15 RX ORDER — ACETAMINOPHEN 325 MG/1
650 TABLET ORAL AS NEEDED
Status: CANCELLED
Start: 2021-07-21

## 2021-07-15 RX ORDER — ONDANSETRON 2 MG/ML
8 INJECTION INTRAMUSCULAR; INTRAVENOUS AS NEEDED
Status: CANCELLED | OUTPATIENT
Start: 2021-07-21

## 2021-07-21 ENCOUNTER — HOSPITAL ENCOUNTER (OUTPATIENT)
Dept: INFUSION THERAPY | Age: 66
Discharge: HOME | End: 2021-07-21
Payer: MEDICARE

## 2021-07-21 ENCOUNTER — OFFICE VISIT (OUTPATIENT)
Dept: ONCOLOGY | Age: 66
End: 2021-07-21
Payer: MEDICARE

## 2021-07-21 VITALS
RESPIRATION RATE: 16 BRPM | SYSTOLIC BLOOD PRESSURE: 144 MMHG | TEMPERATURE: 96.8 F | DIASTOLIC BLOOD PRESSURE: 92 MMHG | HEART RATE: 81 BPM | OXYGEN SATURATION: 97 %

## 2021-07-21 VITALS
BODY MASS INDEX: 25.02 KG/M2 | SYSTOLIC BLOOD PRESSURE: 114 MMHG | DIASTOLIC BLOOD PRESSURE: 72 MMHG | OXYGEN SATURATION: 96 % | WEIGHT: 150.2 LBS | TEMPERATURE: 96.8 F | HEART RATE: 68 BPM | HEIGHT: 65 IN

## 2021-07-21 DIAGNOSIS — C50.811 MALIGNANT NEOPLASM OF OVERLAPPING SITES OF RIGHT BREAST IN FEMALE, ESTROGEN RECEPTOR POSITIVE (HCC): Primary | ICD-10-CM

## 2021-07-21 DIAGNOSIS — M25.50 ARTHRALGIA, UNSPECIFIED JOINT: ICD-10-CM

## 2021-07-21 DIAGNOSIS — Z17.0 MALIGNANT NEOPLASM OF OVERLAPPING SITES OF RIGHT BREAST IN FEMALE, ESTROGEN RECEPTOR POSITIVE (HCC): Primary | ICD-10-CM

## 2021-07-21 DIAGNOSIS — T45.1X5A CHEMOTHERAPY-INDUCED NAUSEA: ICD-10-CM

## 2021-07-21 DIAGNOSIS — Z85.038 HISTORY OF COLON CANCER: ICD-10-CM

## 2021-07-21 DIAGNOSIS — E55.9 VITAMIN D DEFICIENCY: ICD-10-CM

## 2021-07-21 DIAGNOSIS — Z09 CHEMOTHERAPY FOLLOW-UP EXAMINATION: ICD-10-CM

## 2021-07-21 DIAGNOSIS — Z85.41 HISTORY OF CERVICAL CANCER: ICD-10-CM

## 2021-07-21 DIAGNOSIS — Z95.828 PORT-A-CATH IN PLACE: ICD-10-CM

## 2021-07-21 DIAGNOSIS — K12.31 MUCOSITIS DUE TO CHEMOTHERAPY: ICD-10-CM

## 2021-07-21 DIAGNOSIS — R11.0 CHEMOTHERAPY-INDUCED NAUSEA: ICD-10-CM

## 2021-07-21 LAB
25(OH)D3 SERPL-MCNC: 20.7 NG/ML (ref 30–100)
BASOPHILS # BLD: 0.1 K/UL (ref 0–0.1)
BASOPHILS NFR BLD: 2 % (ref 0–1)
DIFFERENTIAL METHOD BLD: ABNORMAL
EOSINOPHIL # BLD: 0.2 K/UL (ref 0–0.4)
EOSINOPHIL NFR BLD: 5 % (ref 0–7)
ERYTHROCYTE [DISTWIDTH] IN BLOOD BY AUTOMATED COUNT: 13.9 % (ref 11.5–14.5)
HCT VFR BLD AUTO: 37.2 % (ref 35–47)
HGB BLD-MCNC: 12.5 G/DL (ref 11.5–16)
IMM GRANULOCYTES # BLD AUTO: 0 K/UL (ref 0–0.04)
IMM GRANULOCYTES NFR BLD AUTO: 1 % (ref 0–0.5)
LYMPHOCYTES # BLD: 1.2 K/UL (ref 0.8–3.5)
LYMPHOCYTES NFR BLD: 29 % (ref 12–49)
MCH RBC QN AUTO: 32.2 PG (ref 26–34)
MCHC RBC AUTO-ENTMCNC: 33.6 G/DL (ref 30–36.5)
MCV RBC AUTO: 95.9 FL (ref 80–99)
MONOCYTES # BLD: 0.3 K/UL (ref 0–1)
MONOCYTES NFR BLD: 8 % (ref 5–13)
NEUTS SEG # BLD: 2.3 K/UL (ref 1.8–8)
NEUTS SEG NFR BLD: 55 % (ref 32–75)
NRBC # BLD: 0 K/UL (ref 0–0.01)
NRBC BLD-RTO: 0 PER 100 WBC
PLATELET # BLD AUTO: 229 K/UL (ref 150–400)
PMV BLD AUTO: 10.9 FL (ref 8.9–12.9)
RBC # BLD AUTO: 3.88 M/UL (ref 3.8–5.2)
WBC # BLD AUTO: 4.1 K/UL (ref 3.6–11)

## 2021-07-21 PROCEDURE — G9711 PT HX TOT COL OR COLON CA: HCPCS | Performed by: INTERNAL MEDICINE

## 2021-07-21 PROCEDURE — G8427 DOCREV CUR MEDS BY ELIG CLIN: HCPCS | Performed by: INTERNAL MEDICINE

## 2021-07-21 PROCEDURE — 74011250636 HC RX REV CODE- 250/636: Performed by: INTERNAL MEDICINE

## 2021-07-21 PROCEDURE — 1101F PT FALLS ASSESS-DOCD LE1/YR: CPT | Performed by: INTERNAL MEDICINE

## 2021-07-21 PROCEDURE — 74011250637 HC RX REV CODE- 250/637: Performed by: INTERNAL MEDICINE

## 2021-07-21 PROCEDURE — G8400 PT W/DXA NO RESULTS DOC: HCPCS | Performed by: INTERNAL MEDICINE

## 2021-07-21 PROCEDURE — 36415 COLL VENOUS BLD VENIPUNCTURE: CPT

## 2021-07-21 PROCEDURE — G0463 HOSPITAL OUTPT CLINIC VISIT: HCPCS | Performed by: NURSE PRACTITIONER

## 2021-07-21 PROCEDURE — G8420 CALC BMI NORM PARAMETERS: HCPCS | Performed by: INTERNAL MEDICINE

## 2021-07-21 PROCEDURE — 85025 COMPLETE CBC W/AUTO DIFF WBC: CPT

## 2021-07-21 PROCEDURE — G9899 SCRN MAM PERF RSLTS DOC: HCPCS | Performed by: INTERNAL MEDICINE

## 2021-07-21 PROCEDURE — 82306 VITAMIN D 25 HYDROXY: CPT

## 2021-07-21 PROCEDURE — 96413 CHEMO IV INFUSION 1 HR: CPT

## 2021-07-21 PROCEDURE — G8432 DEP SCR NOT DOC, RNG: HCPCS | Performed by: INTERNAL MEDICINE

## 2021-07-21 PROCEDURE — 1090F PRES/ABSN URINE INCON ASSESS: CPT | Performed by: INTERNAL MEDICINE

## 2021-07-21 PROCEDURE — 77030012965 HC NDL HUBR BBMI -A

## 2021-07-21 PROCEDURE — 74011000250 HC RX REV CODE- 250: Performed by: INTERNAL MEDICINE

## 2021-07-21 PROCEDURE — 99215 OFFICE O/P EST HI 40 MIN: CPT | Performed by: INTERNAL MEDICINE

## 2021-07-21 PROCEDURE — G8536 NO DOC ELDER MAL SCRN: HCPCS | Performed by: INTERNAL MEDICINE

## 2021-07-21 PROCEDURE — 96375 TX/PRO/DX INJ NEW DRUG ADDON: CPT

## 2021-07-21 RX ORDER — CETIRIZINE HCL 10 MG
10 TABLET ORAL ONCE
Status: COMPLETED | OUTPATIENT
Start: 2021-07-21 | End: 2021-07-21

## 2021-07-21 RX ORDER — ASPIRIN 325 MG
50000 TABLET, DELAYED RELEASE (ENTERIC COATED) ORAL
Qty: 8 CAPSULE | Refills: 0 | Status: SHIPPED | OUTPATIENT
Start: 2021-07-21 | End: 2021-08-10

## 2021-07-21 RX ORDER — SODIUM CHLORIDE 0.9 % (FLUSH) 0.9 %
10 SYRINGE (ML) INJECTION AS NEEDED
Status: DISPENSED | OUTPATIENT
Start: 2021-07-21 | End: 2021-07-21

## 2021-07-21 RX ORDER — SODIUM CHLORIDE 9 MG/ML
10 INJECTION INTRAMUSCULAR; INTRAVENOUS; SUBCUTANEOUS AS NEEDED
Status: ACTIVE | OUTPATIENT
Start: 2021-07-21 | End: 2021-07-21

## 2021-07-21 RX ORDER — HEPARIN 100 UNIT/ML
300-500 SYRINGE INTRAVENOUS AS NEEDED
Status: ACTIVE | OUTPATIENT
Start: 2021-07-21 | End: 2021-07-21

## 2021-07-21 RX ORDER — DEXAMETHASONE SODIUM PHOSPHATE 10 MG/ML
10 INJECTION INTRAMUSCULAR; INTRAVENOUS ONCE
Status: COMPLETED | OUTPATIENT
Start: 2021-07-21 | End: 2021-07-21

## 2021-07-21 RX ORDER — ONDANSETRON 2 MG/ML
8 INJECTION INTRAMUSCULAR; INTRAVENOUS ONCE
Status: COMPLETED | OUTPATIENT
Start: 2021-07-21 | End: 2021-07-21

## 2021-07-21 RX ORDER — SODIUM CHLORIDE 9 MG/ML
25 INJECTION, SOLUTION INTRAVENOUS CONTINUOUS
Status: DISPENSED | OUTPATIENT
Start: 2021-07-21 | End: 2021-07-21

## 2021-07-21 RX ADMIN — CETIRIZINE HYDROCHLORIDE 10 MG: 10 TABLET, FILM COATED ORAL at 10:09

## 2021-07-21 RX ADMIN — FAMOTIDINE 20 MG: 10 INJECTION INTRAVENOUS at 09:45

## 2021-07-21 RX ADMIN — DEXAMETHASONE SODIUM PHOSPHATE 10 MG: 10 INJECTION, SOLUTION INTRAMUSCULAR; INTRAVENOUS at 10:00

## 2021-07-21 RX ADMIN — PACLITAXEL 142 MG: 6 INJECTION, SOLUTION INTRAVENOUS at 10:44

## 2021-07-21 RX ADMIN — ONDANSETRON 8 MG: 2 INJECTION INTRAMUSCULAR; INTRAVENOUS at 09:56

## 2021-07-21 RX ADMIN — SODIUM CHLORIDE 25 ML/HR: 900 INJECTION, SOLUTION INTRAVENOUS at 09:45

## 2021-07-21 RX ADMIN — HEPARIN 500 UNITS: 100 SYRINGE at 13:00

## 2021-07-21 RX ADMIN — Medication 10 ML: at 13:00

## 2021-07-21 NOTE — PROGRESS NOTES
[facility] at [address], [address] [place] [address] Road, [address]: [phone]  F: [phone]    Reason for Visit:   Franchesca Arevalo is a 77 y.o. female who is seen today in office for follow up of Right Breast Cancer on adjuvant weekly Taxol/Herceptin. Treatment History:   · Breast biopsy  · Lumpectomy 4/21/21 at [address] [facility] [address] [facility] [address]  · Adjuvant weekly Taxol/Herceptin 6/16/21 - Current     STAGE:  · T2N0 ER+ HER2 +  · MYRISK negative    History of Present Illness: Franchesca Arevalo is a 77 y.o. female seen today in office for follow up of right breast cancer ER+ HER2+ post lumpectomy. All records at [address] [place] [address]. She started adjuvant weekly Taxol/Herceptin on 6/16/21. She is here today for Day 15 Cycle 2 (Week 6) of weekly Taxol/Herceptin. She reports that she feels well overall today. She is tolerating treatment well overall with some joint/muscle aches and mild mouth sores. She took an epsom salt bath and took Tylenol which did help some. She states that exercising helps some. Mouth sores, relieved with Magic Mouthwash and salt water rinses. She had one episode of nausea and took Compazine which helped. Her appetite is good and energy levels are lower overall. She denies fever, chills, cough, SOB, CP, vomiting, diarrhea, constipation, and neuropathy. CBC and CMP are still pending today. She is ready for treatment today, is using the Cold Cap during treatment. Her supportive  is here today.     Past Medical History:   Diagnosis Date    Cancer [facility])     cervical and colon    Cervical cancer ([facility] [address] 1983    Colon cancer ([facility] [address] 2008      Past Surgical History:   Procedure Laterality Date    ENDOSCOPY, COLON, DIAGNOSTIC        Social History     Tobacco Use    Smoking status: Never Smoker    Smokeless tobacco: Never Used   Substance Use Topics    Alcohol use: Never      Family History   Problem Relation Age of Onset    Cancer Mother    Suleiman Adrian Breast Cancer Mother     Cancer Father     Melanoma Father  Breast Cancer Sister     Colon Cancer Brother     Prostate Cancer Brother     Heart Disease Mother     Hypertension Sister      Current Outpatient Medications   Medication Sig    aluminum-magnesium hydroxide 200-200 mg/5 mL susp 5 mL, diphenhydrAMINE 12.5 mg/5 mL liqd 12.5 mg, lidocaine 2 % soln 5 mL 5 mL by Swish and Spit route four (4) times daily as needed for Pain. Magic mouth wash   Maalox  Lidocaine 2% viscous     Pharmacy to mix equal portions of ingredients to a total volume as indicated in the dispense amount. Please call in script without Benadryl per patient preference    sertraline (ZOLOFT) 50 mg tablet Take 50 mg by mouth daily.  ondansetron (ZOFRAN ODT) 4 mg disintegrating tablet Take 1-2 Tablets by mouth every eight (8) hours as needed for Nausea or Vomiting.  prochlorperazine (Compazine) 5 mg tablet Take 1 tab by mouth every 6 hours as needed for nausea or vomiting    lidocaine-prilocaine (EMLA) topical cream Apply  to affected area as needed for Pain.  sertraline (ZOLOFT) 50 mg tablet TAKE ONE TABLET BY MOUTH EVERY DAY    azithromycin (ZITHROMAX) 250 mg tablet As dir    amoxicillin-clavulanate (AUGMENTIN) 875-125 mg per tablet Take 1 Tab by mouth every twelve (12) hours.  ibuprofen (MOTRIN) 800 mg tablet Take 1 Tab by mouth three (3) times daily as needed for Pain.  omeprazole (PRILOSEC) 40 mg capsule Take 1 Cap by mouth daily.  aspirin (ASPIRIN) 325 mg tablet Take 325 mg by mouth daily. No current facility-administered medications for this visit.      Facility-Administered Medications Ordered in Other Visits   Medication Dose Route Frequency    0.9% sodium chloride infusion  25 mL/hr IntraVENous CONTINUOUS    dexamethasone (PF) (DECADRON) 10 mg/mL injection 10 mg  10 mg IntraVENous ONCE    ondansetron (ZOFRAN) injection 8 mg  8 mg IntraVENous ONCE    cetirizine (ZYRTEC) tablet 10 mg  10 mg Oral ONCE    famotidine (PF) (PEPCID) 20 mg in 0.9% sodium chloride 10 mL injection  20 mg IntraVENous ONCE    PACLitaxeL (TAXOL) 142 mg in 0.9% sodium chloride 250 mL, overfill volume 25 mL chemo infusion  80 mg/m2 (Order-Specific) IntraVENous ONCE    sodium chloride (NS) flush 10 mL  10 mL IntraVENous PRN    0.9% sodium chloride injection 10 mL  10 mL IntraVENous PRN    heparin (porcine) pf 300-500 Units  300-500 Units InterCATHeter PRN      Allergies   Allergen Reactions    Cipro [Ciprofloxacin Hcl] Nausea and Vomiting    Ciprofloxacin Nausea and Vomiting      Review of Systems:  A complete review of systems was obtained, negative except as described above and as reported on ROS sheet scanned into system. Physical Exam:     Visit Vitals  /72 (BP 1 Location: Left upper arm, BP Patient Position: Sitting)   Pulse 68   Temp 96.8 °F (36 °C) (Oral)   Ht 5' 5\" (1.651 m)   Wt 150 lb 3.2 oz (68.1 kg)   SpO2 96%   BMI 24.99 kg/m²     ECOG PS: 0  General: No distress  Eyes: Anicteric sclerae  HENT: Atraumatic, wearing a mask  Neck: Supple  Respiratory: CTAB, normal respiratory effort  CV: Normal rate, regular rhythm, no murmurs, no peripheral edema  GI: Soft, nontender, nondistended, no masses  MS: Normal gait and station. Digits without clubbing or cyanosis. Skin: No rashes, ecchymoses, or petechiae. Normal temperature, turgor, and texture. Port site without redness/swelling  Psych: Alert, oriented, appropriate affect, normal judgment/insight  Neuro: Non focal    Results:     Lab Results   Component Value Date/Time    WBC 4.1 07/21/2021 08:19 AM    HGB 12.5 07/21/2021 08:19 AM    HCT 37.2 07/21/2021 08:19 AM    PLATELET 677 12/52/7237 08:19 AM    MCV 95.9 07/21/2021 08:19 AM    ABS.  NEUTROPHILS 2.3 07/21/2021 08:19 AM    HGB (POC) 14.3 09/20/2016 09:28 AM    HCT (POC) 43.1 09/20/2016 09:28 AM     Lab Results   Component Value Date/Time    Sodium 142 07/07/2021 08:17 AM    Potassium 4.0 07/07/2021 08:17 AM    Chloride 110 (H) 07/07/2021 08:17 AM    CO2 27 07/07/2021 08:17 AM    Glucose 97 07/07/2021 08:17 AM    BUN 14 07/07/2021 08:17 AM    Creatinine 0.71 07/07/2021 08:17 AM    GFR est AA >60 07/07/2021 08:17 AM    GFR est non-AA >60 07/07/2021 08:17 AM    Calcium 8.7 07/07/2021 08:17 AM     Lab Results   Component Value Date/Time    Bilirubin, total 0.3 07/07/2021 08:17 AM    ALT (SGPT) 24 07/07/2021 08:17 AM    Alk. phosphatase 88 07/07/2021 08:17 AM    Protein, total 6.7 07/07/2021 08:17 AM    Albumin 3.6 07/07/2021 08:17 AM    Globulin 3.1 07/07/2021 08:17 AM     All reports from outside facility scanned into media    Records reviewed and summarized above. Pathology report(s) reviewed above. Radiology report(s) reviewed above. Assessment:/PLAN     1) Stage 2 RIGHT Breast Cancer ER+ Her2+ post Lumpectomy 4/21/21  All records at [facility]. No records in our system. Records and history reviewed with patient. Reviewed path and data specifically with patient. Discussed dx, stage and treatment of breast cancer. Patient continues to see Surgery for lumpectomy scar. Discussed adjuvant chemo and hormonal therapy. She had previously seen Dr. Irma Luna with [facility]. Most interested in weekly Taxol/Herceptin. She had a pre chemo ECHO on 5/20/21 at [facility] that was good with EF 60-65%- scanned into media. She already has a port. She started weekly Taxol/Herceptin on 6/16/21. She is here today for Day 15 Cycle 2 (Week 6) of weekly Taxol/Herceptin. She is tolerating chemo well overall with some joint/muscle aches, mild mouth sores, and mild nausea. She is clinically stable today and doing well overall. Labs (CBC) reviewed today. Patient is ready for treatment today. She is doing cold cap with chemo. Next ECHO due by 8/20/21 - will order at next visit. Follow up in 1 week in [address] [place] [address]. Follow up in 2 weeks in [facility]/office. Patient agrees with plan. 2) Hx of Colon Cancer (2008) and Cervical Cancer (8905)  She did not have chemo. On a course of surveillance.      3) Chemo Induced Mouth Sores  Mild, Grade 1. Controlled with Magic Mouthwash and salt water rinses as needed. Will continue to monitor. 4) Chemo Induced Nausea  Very mild, Grade 1. Controlled with anti-emetics PRN. Will continue to monitor. 5) Management of High Risk Medications - Chemotherapy  Toxicities include Grade 1 Oral Mucositis, Grade 1 Arthralgia, and Grade 1 Nausea. Referred to Acupuncture today for arthralgia. Labs (CBC) reviewed today. No dose adjustments needed today. Will monitor for side effects. 6) Psychosocial  Mood good. Coping well. She has good family support. SW/NN support as needed. Her supportive  is here today. Call if questions. Follow up in 1 week in [facility] and 2 weeks in [facility]office. This patient was seen in conjunction with Aneesh Cavanaugh NP. I personally performed a face to face diagnostic evaluation on this patient. I personally reviewed the history and performed the key points on the exam.   I personally reviewed all points in the assessment and created treatment plan with the patient. Specifically pt seen by me  Doing well overall  Tolerating chemo well with manageable side effects. Aches from taxol so will refer to acupuncture. Labs personally reviewed by me. Add in vit d level. Reviewed chemo overall  Continue with chemo as planned. I appreciate the opportunity to participate in Ms. Mila Stacy's care.     Signed By: Jose Maria Daley,

## 2021-07-21 NOTE — PROGRESS NOTES
Vitamin D is low - please let patient know that high-dose weekly Vitamin D x 8 weeks was sent to pharmacy on file.

## 2021-07-21 NOTE — PROGRESS NOTES
[facility] Chemo Progress Note  0800 Ms. Ke Garcia Arrived to [facility] for  Taxol (T6H68) ambulatory in stable condition. Assessment was completed, no acute issues at this time. Port accessed with positive blood return. Labs drawn and sent for processing. Port flushed and capped. Pt left for MD appt. Pt returned to [facility]; cold cap applied. Chemotherapy Flowsheet 7/21/2021   Cycle C2D15   Date 7/21/2021   Drug / Regimen taxol   Pre Meds given   Notes given       Ms. Stacy's vitals were reviewed. Patient Vitals for the past 12 hrs:   Temp Pulse Resp BP SpO2   07/21/21 1300  81  (!) 144/92    07/21/21 0810 96.8 °F (36 °C) 68 16 114/72 97 %       Lab results were obtained and reviewed. Recent Results (from the past 12 hour(s))   CBC WITH AUTOMATED DIFF    Collection Time: 07/21/21  8:19 AM   Result Value Ref Range    WBC 4.1 3.6 - 11.0 K/uL    RBC 3.88 3.80 - 5.20 M/uL    HGB 12.5 11.5 - 16.0 g/dL    HCT 37.2 35.0 - 47.0 %    MCV 95.9 80.0 - 99.0 FL    MCH 32.2 26.0 - 34.0 PG    MCHC 33.6 30.0 - 36.5 g/dL    RDW 13.9 11.5 - 14.5 %    PLATELET 207 668 - 917 K/uL    MPV 10.9 8.9 - 12.9 FL    NRBC 0.0 0  WBC    ABSOLUTE NRBC 0.00 0.00 - 0.01 K/uL    NEUTROPHILS 55 32 - 75 %    LYMPHOCYTES 29 12 - 49 %    MONOCYTES 8 5 - 13 %    EOSINOPHILS 5 0 - 7 %    BASOPHILS 2 (H) 0 - 1 %    IMMATURE GRANULOCYTES 1 (H) 0.0 - 0.5 %    ABS. NEUTROPHILS 2.3 1.8 - 8.0 K/UL    ABS. LYMPHOCYTES 1.2 0.8 - 3.5 K/UL    ABS. MONOCYTES 0.3 0.0 - 1.0 K/UL    ABS. EOSINOPHILS 0.2 0.0 - 0.4 K/UL    ABS. BASOPHILS 0.1 0.0 - 0.1 K/UL    ABS. IMM. GRANS. 0.0 0.00 - 0.04 K/UL    DF AUTOMATED     VITAMIN D, 25 HYDROXY    Collection Time: 07/21/21  9:25 AM   Result Value Ref Range    Vitamin D 25-Hydroxy 20.7 (L) 30 - 100 ng/mL       Pre-medications  were administered as ordered and chemotherapy was initiated.   Medications Administered     0.9% sodium chloride infusion     Admin Date  07/21/2021 Action  New Bag Dose  25 mL/hr Rate  25 mL/hr Route  IntraVENous Administered By  Anna Bunn RN          cetirizine (ZYRTEC) tablet 10 mg     Admin Date  07/21/2021 Action  Given Dose  10 mg Route  Oral Administered By  Anna Bunn RN          dexamethasone (PF) (DECADRON) 10 mg/mL injection 10 mg     Admin Date  07/21/2021 Action  Given Dose  10 mg Route  IntraVENous Administered By  Anna Bunn RN          famotidine (PF) (PEPCID) 20 mg in 0.9% sodium chloride 10 mL injection     Admin Date  07/21/2021 Action  Given Dose  20 mg Route  IntraVENous Administered By  Anna Bunn RN          heparin (porcine) pf 300-500 Units     Admin Date  07/21/2021 Action  Given Dose  500 Units Route  InterCATHeter Administered By  Anna Bunn RN          ondansetron [facility]) injection 8 mg     Admin Date  07/21/2021 Action  Given Dose  8 mg Route  IntraVENous Administered By  Anna Bunn RN          PACLitaxeL (TAXOL) 142 mg in 0.9% sodium chloride 250 mL, overfill volume 25 mL chemo infusion     Admin Date  07/21/2021 Action  New Bag Dose  142 mg Rate  298.7 mL/hr Route  IntraVENous Administered By  Anna Bunn RN          sodium chloride (NS) flush 10 mL     Admin Date  07/21/2021 Action  Given Dose  10 mL Route  IntraVENous Administered By  Anna Bunn RN                5736 Patient tolerated treatment well. Port maintained positive blood return throughout treatment. Port flushed, heparinized and de accessed per protocol. Patient was discharged from [facility] in stable condition. Patient is aware of next appointment.      Future Appointments   Date Time Provider Joseph Workman   7/28/2021  9:00 AM D3 MARTY LONG [address] H   8/4/2021  8:00 AM D1 MARTY LONG [address] H   8/4/2021  8:15 AM Johnathan Lewis Scarce,  [address] BS AMB   8/11/2021  8:00 AM C2 [facility] LONG [facility]   8/18/2021  9:00 AM G3 MARTY LONG [address] RN  July 21, 2021

## 2021-07-21 NOTE — PROGRESS NOTES
Patient visited by [facility] Partner Volunteer on 7/21/2021 in [facility] bed 8.    Rev.  Maria Del Carmen Ivan MDiv, [facility], [address] [facility] paging service: [phone]

## 2021-07-21 NOTE — PROGRESS NOTES
PRATEEK Verified. Called pt on mobile phone number listed. Patient was made aware of her Vit D levels being low and advised a High Dose of weekly Vit D is being sent to her Pharmacy. Patient was appreciative over call.   Ansley Vega

## 2021-07-21 NOTE — PROGRESS NOTES
Eric Marcus is a 77 y.o. female  Chief Complaint   Patient presents with    Chemotherapy    Breast Cancer     1. Have you been to the ER, urgent care clinic since your last visit? Hospitalized since your last visit? No.  2. Have you seen or consulted any other health care providers outside of the [address] [place] [address] since your last visit? Include any pap smears or colon screening. Yes, patient [facility] and has PT every week. Chelsie Villanueva)    Patient states she has been feeling a little down lately and has been having body aches recently. Patient has been getting little itchy bumps on her skin and cannot scratch due to them starting to hurt.

## 2021-07-23 RX ORDER — ONDANSETRON 2 MG/ML
8 INJECTION INTRAMUSCULAR; INTRAVENOUS AS NEEDED
Status: CANCELLED | OUTPATIENT
Start: 2021-08-04

## 2021-07-23 RX ORDER — DIPHENHYDRAMINE HYDROCHLORIDE 50 MG/ML
50 INJECTION, SOLUTION INTRAMUSCULAR; INTRAVENOUS AS NEEDED
Status: CANCELLED
Start: 2021-07-28

## 2021-07-23 RX ORDER — DIPHENHYDRAMINE HYDROCHLORIDE 50 MG/ML
25 INJECTION, SOLUTION INTRAMUSCULAR; INTRAVENOUS AS NEEDED
Status: CANCELLED
Start: 2021-07-28

## 2021-07-23 RX ORDER — ONDANSETRON 2 MG/ML
8 INJECTION INTRAMUSCULAR; INTRAVENOUS ONCE
Status: CANCELLED
Start: 2021-07-28 | End: 2021-07-28

## 2021-07-23 RX ORDER — SODIUM CHLORIDE 9 MG/ML
25 INJECTION, SOLUTION INTRAVENOUS CONTINUOUS
Status: CANCELLED | OUTPATIENT
Start: 2021-08-11

## 2021-07-23 RX ORDER — CETIRIZINE HCL 10 MG
10 TABLET ORAL ONCE
Status: CANCELLED
Start: 2021-07-28 | End: 2021-07-28

## 2021-07-23 RX ORDER — HYDROCORTISONE SODIUM SUCCINATE 100 MG/2ML
100 INJECTION, POWDER, FOR SOLUTION INTRAMUSCULAR; INTRAVENOUS AS NEEDED
Status: CANCELLED | OUTPATIENT
Start: 2021-08-04

## 2021-07-23 RX ORDER — CETIRIZINE HCL 10 MG
10 TABLET ORAL ONCE
Status: CANCELLED
Start: 2021-08-04 | End: 2021-08-04

## 2021-07-23 RX ORDER — ALBUTEROL SULFATE 0.83 MG/ML
2.5 SOLUTION RESPIRATORY (INHALATION) AS NEEDED
Status: CANCELLED
Start: 2021-08-04

## 2021-07-23 RX ORDER — EPINEPHRINE 1 MG/ML
0.3 INJECTION, SOLUTION, CONCENTRATE INTRAVENOUS AS NEEDED
Status: CANCELLED | OUTPATIENT
Start: 2021-08-11

## 2021-07-23 RX ORDER — ACETAMINOPHEN 325 MG/1
650 TABLET ORAL AS NEEDED
Status: CANCELLED
Start: 2021-08-11

## 2021-07-23 RX ORDER — ALBUTEROL SULFATE 0.83 MG/ML
2.5 SOLUTION RESPIRATORY (INHALATION) AS NEEDED
Status: CANCELLED
Start: 2021-08-11

## 2021-07-23 RX ORDER — ONDANSETRON 2 MG/ML
8 INJECTION INTRAMUSCULAR; INTRAVENOUS ONCE
Status: CANCELLED
Start: 2021-08-11 | End: 2021-08-11

## 2021-07-23 RX ORDER — CETIRIZINE HCL 10 MG
10 TABLET ORAL ONCE
Status: CANCELLED
Start: 2021-08-11 | End: 2021-08-11

## 2021-07-23 RX ORDER — ACETAMINOPHEN 325 MG/1
650 TABLET ORAL AS NEEDED
Status: CANCELLED
Start: 2021-08-04

## 2021-07-23 RX ORDER — DIPHENHYDRAMINE HYDROCHLORIDE 50 MG/ML
50 INJECTION, SOLUTION INTRAMUSCULAR; INTRAVENOUS AS NEEDED
Status: CANCELLED
Start: 2021-08-04

## 2021-07-23 RX ORDER — ONDANSETRON 2 MG/ML
8 INJECTION INTRAMUSCULAR; INTRAVENOUS AS NEEDED
Status: CANCELLED | OUTPATIENT
Start: 2021-07-28

## 2021-07-23 RX ORDER — ONDANSETRON 2 MG/ML
8 INJECTION INTRAMUSCULAR; INTRAVENOUS ONCE
Status: CANCELLED
Start: 2021-08-04 | End: 2021-08-04

## 2021-07-23 RX ORDER — DIPHENHYDRAMINE HYDROCHLORIDE 50 MG/ML
50 INJECTION, SOLUTION INTRAMUSCULAR; INTRAVENOUS AS NEEDED
Status: CANCELLED
Start: 2021-08-11

## 2021-07-23 RX ORDER — ACETAMINOPHEN 325 MG/1
650 TABLET ORAL AS NEEDED
Status: CANCELLED
Start: 2021-07-28

## 2021-07-23 RX ORDER — DEXAMETHASONE SODIUM PHOSPHATE 4 MG/ML
10 INJECTION, SOLUTION INTRA-ARTICULAR; INTRALESIONAL; INTRAMUSCULAR; INTRAVENOUS; SOFT TISSUE ONCE
Status: CANCELLED | OUTPATIENT
Start: 2021-08-04 | End: 2021-08-04

## 2021-07-23 RX ORDER — DEXAMETHASONE SODIUM PHOSPHATE 4 MG/ML
10 INJECTION, SOLUTION INTRA-ARTICULAR; INTRALESIONAL; INTRAMUSCULAR; INTRAVENOUS; SOFT TISSUE ONCE
Status: CANCELLED | OUTPATIENT
Start: 2021-07-28 | End: 2021-07-28

## 2021-07-23 RX ORDER — SODIUM CHLORIDE 0.9 % (FLUSH) 0.9 %
10 SYRINGE (ML) INJECTION AS NEEDED
Status: CANCELLED | OUTPATIENT
Start: 2021-07-28

## 2021-07-23 RX ORDER — HEPARIN 100 UNIT/ML
300-500 SYRINGE INTRAVENOUS AS NEEDED
Status: CANCELLED
Start: 2021-08-04

## 2021-07-23 RX ORDER — EPINEPHRINE 1 MG/ML
0.3 INJECTION, SOLUTION, CONCENTRATE INTRAVENOUS AS NEEDED
Status: CANCELLED | OUTPATIENT
Start: 2021-08-04

## 2021-07-23 RX ORDER — EPINEPHRINE 1 MG/ML
0.3 INJECTION, SOLUTION, CONCENTRATE INTRAVENOUS AS NEEDED
Status: CANCELLED | OUTPATIENT
Start: 2021-07-28

## 2021-07-23 RX ORDER — SODIUM CHLORIDE 0.9 % (FLUSH) 0.9 %
10 SYRINGE (ML) INJECTION AS NEEDED
Status: CANCELLED | OUTPATIENT
Start: 2021-08-04

## 2021-07-23 RX ORDER — HEPARIN 100 UNIT/ML
300-500 SYRINGE INTRAVENOUS AS NEEDED
Status: CANCELLED
Start: 2021-07-28

## 2021-07-23 RX ORDER — HEPARIN 100 UNIT/ML
300-500 SYRINGE INTRAVENOUS AS NEEDED
Status: CANCELLED
Start: 2021-08-11

## 2021-07-23 RX ORDER — SODIUM CHLORIDE 9 MG/ML
10 INJECTION INTRAMUSCULAR; INTRAVENOUS; SUBCUTANEOUS AS NEEDED
Status: CANCELLED | OUTPATIENT
Start: 2021-08-11

## 2021-07-23 RX ORDER — HYDROCORTISONE SODIUM SUCCINATE 100 MG/2ML
100 INJECTION, POWDER, FOR SOLUTION INTRAMUSCULAR; INTRAVENOUS AS NEEDED
Status: CANCELLED | OUTPATIENT
Start: 2021-08-11

## 2021-07-23 RX ORDER — ALBUTEROL SULFATE 0.83 MG/ML
2.5 SOLUTION RESPIRATORY (INHALATION) AS NEEDED
Status: CANCELLED
Start: 2021-07-28

## 2021-07-23 RX ORDER — DEXAMETHASONE SODIUM PHOSPHATE 4 MG/ML
10 INJECTION, SOLUTION INTRA-ARTICULAR; INTRALESIONAL; INTRAMUSCULAR; INTRAVENOUS; SOFT TISSUE ONCE
Status: CANCELLED | OUTPATIENT
Start: 2021-08-11 | End: 2021-08-11

## 2021-07-23 RX ORDER — DIPHENHYDRAMINE HYDROCHLORIDE 50 MG/ML
25 INJECTION, SOLUTION INTRAMUSCULAR; INTRAVENOUS AS NEEDED
Status: CANCELLED
Start: 2021-08-11

## 2021-07-23 RX ORDER — HYDROCORTISONE SODIUM SUCCINATE 100 MG/2ML
100 INJECTION, POWDER, FOR SOLUTION INTRAMUSCULAR; INTRAVENOUS AS NEEDED
Status: CANCELLED | OUTPATIENT
Start: 2021-07-28

## 2021-07-23 RX ORDER — DIPHENHYDRAMINE HYDROCHLORIDE 50 MG/ML
25 INJECTION, SOLUTION INTRAMUSCULAR; INTRAVENOUS AS NEEDED
Status: CANCELLED
Start: 2021-08-04

## 2021-07-23 RX ORDER — SODIUM CHLORIDE 9 MG/ML
25 INJECTION, SOLUTION INTRAVENOUS CONTINUOUS
Status: CANCELLED | OUTPATIENT
Start: 2021-08-04

## 2021-07-23 RX ORDER — ONDANSETRON 2 MG/ML
8 INJECTION INTRAMUSCULAR; INTRAVENOUS AS NEEDED
Status: CANCELLED | OUTPATIENT
Start: 2021-08-11

## 2021-07-23 RX ORDER — SODIUM CHLORIDE 9 MG/ML
10 INJECTION INTRAMUSCULAR; INTRAVENOUS; SUBCUTANEOUS AS NEEDED
Status: CANCELLED | OUTPATIENT
Start: 2021-08-04

## 2021-07-23 RX ORDER — SODIUM CHLORIDE 0.9 % (FLUSH) 0.9 %
10 SYRINGE (ML) INJECTION AS NEEDED
Status: CANCELLED | OUTPATIENT
Start: 2021-08-11

## 2021-07-23 RX ORDER — SODIUM CHLORIDE 9 MG/ML
10 INJECTION INTRAMUSCULAR; INTRAVENOUS; SUBCUTANEOUS AS NEEDED
Status: CANCELLED | OUTPATIENT
Start: 2021-07-28

## 2021-07-27 ENCOUNTER — TELEPHONE (OUTPATIENT)
Dept: ONCOLOGY | Age: 66
End: 2021-07-27

## 2021-07-27 DIAGNOSIS — F41.8 ANXIETY ABOUT HEALTH: ICD-10-CM

## 2021-07-27 DIAGNOSIS — C50.811 MALIGNANT NEOPLASM OF OVERLAPPING SITES OF RIGHT BREAST IN FEMALE, ESTROGEN RECEPTOR POSITIVE (HCC): Primary | ICD-10-CM

## 2021-07-27 DIAGNOSIS — Z17.0 MALIGNANT NEOPLASM OF OVERLAPPING SITES OF RIGHT BREAST IN FEMALE, ESTROGEN RECEPTOR POSITIVE (HCC): Primary | ICD-10-CM

## 2021-07-27 RX ORDER — LORAZEPAM 0.5 MG/1
0.5 TABLET ORAL
Qty: 30 TABLET | Refills: 0 | Status: SHIPPED | OUTPATIENT
Start: 2021-07-27 | End: 2021-08-20

## 2021-07-27 NOTE — TELEPHONE ENCOUNTER
Called the patient and verified ID x 2. Asked how the patient is doing and the patient stated that she is \"struggling\" and that last week was difficult with chemotherapy and the cold cap which was \"extremely painful\" and that she is having \"huge panic attacks\" this week about tomorrow's treatment and that she feels she is at a cross roads and knows that she has Her-2 triple positive breast cancer and is having chemotherapy with Herceptin which is a targeted treatment which there are not a lot of those types of treatment and that it \"cuts my percentages\" and that she is getting Herceptin tomorrow but wants to know what other options there are and that it was discussed last week when she saw Dr. Mely Freedman and that she has \"really tried to stay the course\" but has \"kind of hit a wall\" and that she has had a headache for the entire week and has had body aches that keep her up at night and that she is taking extra strength Tylenol and epsom salt baths and that it is so hot outside she is unable to exercise and is trying to keep herself \"sane\" and that 12 weeks in a row is \"a lot\" and was wondering if she is able to take breaks and that just as she is starting to feel better it is time for another treatment and that she feels like she has been doing well and has \"prayed and prayed and prayed\" but is \"in a difficult place\" and does not want to stop the cold cap because she is not losing her hair and is unable to go forward without it and is wondering what options there are like skipping a week, tweaking the treatment or stopping and that she is unable to get rid of this headache and is concerned about wearing the cold cap and she \"just doesn't know\" and \"needs guidance\" and does not want to cancel everything out that she has done but would like a call from Dr. Mely Freedman to discuss her questions and concerns.   Informed the patient that a prescription for Ativan has been placed and that Dr. Mely Freedman and Demi Atkins NP will be made aware of the above information and they will call her back when they are able to however they are in clinic seeing patients. The patient verbalized understanding and stated she will talk to Dr. Mesfin Arrington about the prescription and denied any further questions or concerns.

## 2021-07-27 NOTE — TELEPHONE ENCOUNTER
Patient called and stated that she needed to speak with Dr. Thu Fritz directly as she is experiencing some severe symptoms and is now having panic attacks. Patient has been feeling really down and is very nervous about tomorrow.  Patient believes that this is kind of urgent and would like to speak with someone as soon as possible      Call back [phone]

## 2021-07-28 ENCOUNTER — HOSPITAL ENCOUNTER (OUTPATIENT)
Dept: INFUSION THERAPY | Age: 66
Discharge: HOME | End: 2021-07-28
Payer: MEDICARE

## 2021-07-28 VITALS
WEIGHT: 151.4 LBS | HEIGHT: 65 IN | SYSTOLIC BLOOD PRESSURE: 124 MMHG | TEMPERATURE: 96.9 F | RESPIRATION RATE: 18 BRPM | HEART RATE: 74 BPM | BODY MASS INDEX: 25.22 KG/M2 | DIASTOLIC BLOOD PRESSURE: 64 MMHG

## 2021-07-28 DIAGNOSIS — Z17.0 MALIGNANT NEOPLASM OF OVERLAPPING SITES OF RIGHT BREAST IN FEMALE, ESTROGEN RECEPTOR POSITIVE (HCC): Primary | ICD-10-CM

## 2021-07-28 DIAGNOSIS — C50.811 MALIGNANT NEOPLASM OF OVERLAPPING SITES OF RIGHT BREAST IN FEMALE, ESTROGEN RECEPTOR POSITIVE (HCC): Primary | ICD-10-CM

## 2021-07-28 LAB
ALBUMIN SERPL-MCNC: 3.8 G/DL (ref 3.5–5)
ALBUMIN/GLOB SERPL: 1.1 {RATIO} (ref 1.1–2.2)
ALP SERPL-CCNC: 87 U/L (ref 45–117)
ALT SERPL-CCNC: 40 U/L (ref 12–78)
ANION GAP SERPL CALC-SCNC: 3 MMOL/L (ref 5–15)
AST SERPL-CCNC: 27 U/L (ref 15–37)
BASOPHILS # BLD: 0.1 K/UL (ref 0–0.1)
BASOPHILS NFR BLD: 2 % (ref 0–1)
BILIRUB SERPL-MCNC: 0.3 MG/DL (ref 0.2–1)
BUN SERPL-MCNC: 14 MG/DL (ref 6–20)
BUN/CREAT SERPL: 19 (ref 12–20)
CALCIUM SERPL-MCNC: 9.1 MG/DL (ref 8.5–10.1)
CHLORIDE SERPL-SCNC: 111 MMOL/L (ref 97–108)
CO2 SERPL-SCNC: 25 MMOL/L (ref 21–32)
CREAT SERPL-MCNC: 0.72 MG/DL (ref 0.55–1.02)
DIFFERENTIAL METHOD BLD: ABNORMAL
EOSINOPHIL # BLD: 0.1 K/UL (ref 0–0.4)
EOSINOPHIL NFR BLD: 3 % (ref 0–7)
ERYTHROCYTE [DISTWIDTH] IN BLOOD BY AUTOMATED COUNT: 14 % (ref 11.5–14.5)
GLOBULIN SER CALC-MCNC: 3.4 G/DL (ref 2–4)
GLUCOSE SERPL-MCNC: 100 MG/DL (ref 65–100)
HCT VFR BLD AUTO: 38.2 % (ref 35–47)
HGB BLD-MCNC: 12.7 G/DL (ref 11.5–16)
IMM GRANULOCYTES # BLD AUTO: 0 K/UL (ref 0–0.04)
IMM GRANULOCYTES NFR BLD AUTO: 1 % (ref 0–0.5)
LYMPHOCYTES # BLD: 1.3 K/UL (ref 0.8–3.5)
LYMPHOCYTES NFR BLD: 28 % (ref 12–49)
MCH RBC QN AUTO: 31.8 PG (ref 26–34)
MCHC RBC AUTO-ENTMCNC: 33.2 G/DL (ref 30–36.5)
MCV RBC AUTO: 95.7 FL (ref 80–99)
MONOCYTES # BLD: 0.3 K/UL (ref 0–1)
MONOCYTES NFR BLD: 7 % (ref 5–13)
NEUTS SEG # BLD: 2.6 K/UL (ref 1.8–8)
NEUTS SEG NFR BLD: 59 % (ref 32–75)
NRBC # BLD: 0 K/UL (ref 0–0.01)
NRBC BLD-RTO: 0 PER 100 WBC
PLATELET # BLD AUTO: 255 K/UL (ref 150–400)
PMV BLD AUTO: 11.1 FL (ref 8.9–12.9)
POTASSIUM SERPL-SCNC: 4.1 MMOL/L (ref 3.5–5.1)
PROT SERPL-MCNC: 7.2 G/DL (ref 6.4–8.2)
RBC # BLD AUTO: 3.99 M/UL (ref 3.8–5.2)
SODIUM SERPL-SCNC: 139 MMOL/L (ref 136–145)
WBC # BLD AUTO: 4.5 K/UL (ref 3.6–11)

## 2021-07-28 PROCEDURE — 74011250636 HC RX REV CODE- 250/636: Performed by: INTERNAL MEDICINE

## 2021-07-28 PROCEDURE — 80053 COMPREHEN METABOLIC PANEL: CPT

## 2021-07-28 PROCEDURE — 36415 COLL VENOUS BLD VENIPUNCTURE: CPT

## 2021-07-28 PROCEDURE — 96402 CHEMO HORMON ANTINEOPL SQ/IM: CPT

## 2021-07-28 PROCEDURE — 85025 COMPLETE CBC W/AUTO DIFF WBC: CPT

## 2021-07-28 RX ORDER — SODIUM CHLORIDE 9 MG/ML
25 INJECTION, SOLUTION INTRAVENOUS CONTINUOUS
Status: DISPENSED | OUTPATIENT
Start: 2021-07-28 | End: 2021-07-28

## 2021-07-28 RX ADMIN — TRASTUZUMAB AND HYALURONIDASE-OYSK 600 MG: 600; 10000 INJECTION, SOLUTION SUBCUTANEOUS at 10:55

## 2021-07-28 NOTE — PROGRESS NOTES
[facility] Chemo Progress Note    Date: July 28, 2021      0900: Ms. Kam Gómez Arrived to [facility] for  C3D1 Hylecta ambulatory in stable condition. Assessment was completed. Reporting headaches and anxiety following treatment last week. Plan to hold Taxol and cold capping today. Labs drawn peripherally from left [facility] and sent for processing. Patient denies any symptoms of COVID-19, including SOB, coughing, fever, or generally not feeling well. Patient denies any recent exposure to COVID-19. Patient denies any recent contact with family or friends that have a pending COVID-19 test.    1: Labs reviewed. Criteria for treatment was met. Chemotherapy Flowsheet 7/28/2021   Cycle C3D1   Date 7/28/2021   Drug / Regimen Hylecta   Pre Meds -   Notes Given left thigh       Ms. Stacy's vitals were reviewed. Patient Vitals for the past 12 hrs:   Temp Pulse Resp BP   07/28/21 0901 96.9 °F (36.1 °C) 74 18 124/64       Lab results were obtained and reviewed. Recent Results (from the past 12 hour(s))   CBC WITH AUTOMATED DIFF    Collection Time: 07/28/21  9:11 AM   Result Value Ref Range    WBC 4.5 3.6 - 11.0 K/uL    RBC 3.99 3.80 - 5.20 M/uL    HGB 12.7 11.5 - 16.0 g/dL    HCT 38.2 35.0 - 47.0 %    MCV 95.7 80.0 - 99.0 FL    MCH 31.8 26.0 - 34.0 PG    MCHC 33.2 30.0 - 36.5 g/dL    RDW 14.0 11.5 - 14.5 %    PLATELET 049 869 - 541 K/uL    MPV 11.1 8.9 - 12.9 FL    NRBC 0.0 0  WBC    ABSOLUTE NRBC 0.00 0.00 - 0.01 K/uL    NEUTROPHILS 59 32 - 75 %    LYMPHOCYTES 28 12 - 49 %    MONOCYTES 7 5 - 13 %    EOSINOPHILS 3 0 - 7 %    BASOPHILS 2 (H) 0 - 1 %    IMMATURE GRANULOCYTES 1 (H) 0.0 - 0.5 %    ABS. NEUTROPHILS 2.6 1.8 - 8.0 K/UL    ABS. LYMPHOCYTES 1.3 0.8 - 3.5 K/UL    ABS. MONOCYTES 0.3 0.0 - 1.0 K/UL    ABS. EOSINOPHILS 0.1 0.0 - 0.4 K/UL    ABS. BASOPHILS 0.1 0.0 - 0.1 K/UL    ABS. IMM.  GRANS. 0.0 0.00 - 0.04 K/UL    DF AUTOMATED     METABOLIC PANEL, COMPREHENSIVE    Collection Time: 07/28/21  9:11 AM   Result Value Ref Range Sodium 139 136 - 145 mmol/L    Potassium 4.1 3.5 - 5.1 mmol/L    Chloride 111 (H) 97 - 108 mmol/L    CO2 25 21 - 32 mmol/L    Anion gap 3 (L) 5 - 15 mmol/L    Glucose 100 65 - 100 mg/dL    BUN 14 6 - 20 MG/DL    Creatinine 0.72 0.55 - 1.02 MG/DL    BUN/Creatinine ratio 19 12 - 20      GFR est AA >60 >60 ml/min/1.73m2    GFR est non-AA >60 >60 ml/min/1.73m2    Calcium 9.1 8.5 - 10.1 MG/DL    Bilirubin, total 0.3 0.2 - 1.0 MG/DL    ALT (SGPT) 40 12 - 78 U/L    AST (SGOT) 27 15 - 37 U/L    Alk. phosphatase 87 45 - 117 U/L    Protein, total 7.2 6.4 - 8.2 g/dL    Albumin 3.8 3.5 - 5.0 g/dL    Globulin 3.4 2.0 - 4.0 g/dL    A-G Ratio 1.1 1.1 - 2.2       Pre-medications  were administered as ordered and chemotherapy was initiated. Medications Administered     trastuzumab-hyaluronidase-oysk (HERCEPTIN HYLECTA) injection 600 mg     Admin Date  07/28/2021 Action  Given Dose  600 mg Route  SubCUTAneous Administered By  Monica Thrasher              Given SQ to left thigh      1100: Patient tolerated treatment well. Patient was discharged in stable condition. Patient is aware of next scheduled [facility] appointment on 8/4/21.     Future Appointments   Date Time Provider Joseph Workman   8/4/2021  8:00 AM D1 MARTY LONG [address]   8/4/2021  8:15 AM Johnathan Baker,  [address] BS AMB   8/11/2021  8:00 AM C2 MARTY LONG [place] [facility] H   8/18/2021  9:00 AM G3 MARTY LONG [address] [facility]         Sridhar Sánchez RN  July 28, 2021

## 2021-07-30 ENCOUNTER — TELEPHONE (OUTPATIENT)
Dept: PALLATIVE CARE | Age: 66
End: 2021-07-30

## 2021-07-30 NOTE — TELEPHONE ENCOUNTER
Dr. Mindy Choe Note  [phone]  Fax [phone]     Name:  Mayo Teixeira  YOB: 1955    Received acupuncture referral for Mayo Teixeira from Eliza Wen NP. Pt has history of right breast cancer and is experiencing pain. Nurse called pt to schedule appt, no answer, left message.      SKYLER KaminskiN, RN, [facility]  Clinical Referral Navigator

## 2021-08-03 ENCOUNTER — DOCUMENTATION ONLY (OUTPATIENT)
Dept: ONCOLOGY | Age: 66
End: 2021-08-03

## 2021-08-03 NOTE — PROGRESS NOTES
Referral to Acupuncture:    Patient is now scheduled with Bella Mandujano on 8/17/21 at 1:30pm! Fax confirmation sheet will be faxed into patients chart.   Richard Choudhury

## 2021-08-04 ENCOUNTER — OFFICE VISIT (OUTPATIENT)
Dept: ONCOLOGY | Age: 66
End: 2021-08-04
Payer: MEDICARE

## 2021-08-04 ENCOUNTER — DOCUMENTATION ONLY (OUTPATIENT)
Dept: ONCOLOGY | Age: 66
End: 2021-08-04

## 2021-08-04 ENCOUNTER — HOSPITAL ENCOUNTER (OUTPATIENT)
Dept: INFUSION THERAPY | Age: 66
Discharge: HOME | End: 2021-08-04
Payer: MEDICARE

## 2021-08-04 VITALS
TEMPERATURE: 97.1 F | RESPIRATION RATE: 18 BRPM | BODY MASS INDEX: 25.19 KG/M2 | DIASTOLIC BLOOD PRESSURE: 86 MMHG | HEIGHT: 65 IN | SYSTOLIC BLOOD PRESSURE: 147 MMHG | WEIGHT: 151.2 LBS | HEART RATE: 69 BPM

## 2021-08-04 VITALS
DIASTOLIC BLOOD PRESSURE: 68 MMHG | WEIGHT: 151.2 LBS | HEIGHT: 65 IN | OXYGEN SATURATION: 97 % | BODY MASS INDEX: 25.19 KG/M2 | HEART RATE: 79 BPM | SYSTOLIC BLOOD PRESSURE: 133 MMHG | TEMPERATURE: 97.1 F

## 2021-08-04 DIAGNOSIS — C50.811 MALIGNANT NEOPLASM OF OVERLAPPING SITES OF RIGHT BREAST IN FEMALE, ESTROGEN RECEPTOR POSITIVE (HCC): Primary | ICD-10-CM

## 2021-08-04 DIAGNOSIS — M25.50 ARTHRALGIA, UNSPECIFIED JOINT: ICD-10-CM

## 2021-08-04 DIAGNOSIS — Z17.0 MALIGNANT NEOPLASM OF OVERLAPPING SITES OF RIGHT BREAST IN FEMALE, ESTROGEN RECEPTOR POSITIVE (HCC): ICD-10-CM

## 2021-08-04 DIAGNOSIS — Z09 CHEMOTHERAPY FOLLOW-UP EXAMINATION: ICD-10-CM

## 2021-08-04 DIAGNOSIS — Z95.828 PORT-A-CATH IN PLACE: Primary | ICD-10-CM

## 2021-08-04 DIAGNOSIS — Z17.0 MALIGNANT NEOPLASM OF OVERLAPPING SITES OF RIGHT BREAST IN FEMALE, ESTROGEN RECEPTOR POSITIVE (HCC): Primary | ICD-10-CM

## 2021-08-04 DIAGNOSIS — Z85.038 HISTORY OF COLON CANCER: ICD-10-CM

## 2021-08-04 DIAGNOSIS — C50.811 MALIGNANT NEOPLASM OF OVERLAPPING SITES OF RIGHT BREAST IN FEMALE, ESTROGEN RECEPTOR POSITIVE (HCC): ICD-10-CM

## 2021-08-04 LAB
BASOPHILS # BLD: 0.1 K/UL (ref 0–0.1)
BASOPHILS NFR BLD: 1 % (ref 0–1)
DIFFERENTIAL METHOD BLD: ABNORMAL
EOSINOPHIL # BLD: 0.1 K/UL (ref 0–0.4)
EOSINOPHIL NFR BLD: 2 % (ref 0–7)
ERYTHROCYTE [DISTWIDTH] IN BLOOD BY AUTOMATED COUNT: 14.2 % (ref 11.5–14.5)
HCT VFR BLD AUTO: 37 % (ref 35–47)
HGB BLD-MCNC: 12.1 G/DL (ref 11.5–16)
IMM GRANULOCYTES # BLD AUTO: 0 K/UL (ref 0–0.04)
IMM GRANULOCYTES NFR BLD AUTO: 1 % (ref 0–0.5)
LYMPHOCYTES # BLD: 1.1 K/UL (ref 0.8–3.5)
LYMPHOCYTES NFR BLD: 21 % (ref 12–49)
MCH RBC QN AUTO: 31.7 PG (ref 26–34)
MCHC RBC AUTO-ENTMCNC: 32.7 G/DL (ref 30–36.5)
MCV RBC AUTO: 96.9 FL (ref 80–99)
MONOCYTES # BLD: 0.5 K/UL (ref 0–1)
MONOCYTES NFR BLD: 9 % (ref 5–13)
NEUTS SEG # BLD: 3.6 K/UL (ref 1.8–8)
NEUTS SEG NFR BLD: 66 % (ref 32–75)
NRBC # BLD: 0 K/UL (ref 0–0.01)
NRBC BLD-RTO: 0 PER 100 WBC
PLATELET # BLD AUTO: 225 K/UL (ref 150–400)
PMV BLD AUTO: 11.1 FL (ref 8.9–12.9)
RBC # BLD AUTO: 3.82 M/UL (ref 3.8–5.2)
WBC # BLD AUTO: 5.5 K/UL (ref 3.6–11)

## 2021-08-04 PROCEDURE — 74011250636 HC RX REV CODE- 250/636: Performed by: INTERNAL MEDICINE

## 2021-08-04 PROCEDURE — 77030012965 HC NDL HUBR BBMI -A

## 2021-08-04 PROCEDURE — G9899 SCRN MAM PERF RSLTS DOC: HCPCS | Performed by: INTERNAL MEDICINE

## 2021-08-04 PROCEDURE — 36415 COLL VENOUS BLD VENIPUNCTURE: CPT

## 2021-08-04 PROCEDURE — 74011000250 HC RX REV CODE- 250: Performed by: INTERNAL MEDICINE

## 2021-08-04 PROCEDURE — 99215 OFFICE O/P EST HI 40 MIN: CPT | Performed by: INTERNAL MEDICINE

## 2021-08-04 PROCEDURE — G8400 PT W/DXA NO RESULTS DOC: HCPCS | Performed by: INTERNAL MEDICINE

## 2021-08-04 PROCEDURE — 96413 CHEMO IV INFUSION 1 HR: CPT

## 2021-08-04 PROCEDURE — G9711 PT HX TOT COL OR COLON CA: HCPCS | Performed by: INTERNAL MEDICINE

## 2021-08-04 PROCEDURE — G8419 CALC BMI OUT NRM PARAM NOF/U: HCPCS | Performed by: INTERNAL MEDICINE

## 2021-08-04 PROCEDURE — 74011250637 HC RX REV CODE- 250/637: Performed by: INTERNAL MEDICINE

## 2021-08-04 PROCEDURE — 85025 COMPLETE CBC W/AUTO DIFF WBC: CPT

## 2021-08-04 PROCEDURE — G0463 HOSPITAL OUTPT CLINIC VISIT: HCPCS | Performed by: INTERNAL MEDICINE

## 2021-08-04 PROCEDURE — 1101F PT FALLS ASSESS-DOCD LE1/YR: CPT | Performed by: INTERNAL MEDICINE

## 2021-08-04 PROCEDURE — G8427 DOCREV CUR MEDS BY ELIG CLIN: HCPCS | Performed by: INTERNAL MEDICINE

## 2021-08-04 PROCEDURE — 1090F PRES/ABSN URINE INCON ASSESS: CPT | Performed by: INTERNAL MEDICINE

## 2021-08-04 PROCEDURE — 96375 TX/PRO/DX INJ NEW DRUG ADDON: CPT

## 2021-08-04 PROCEDURE — G8536 NO DOC ELDER MAL SCRN: HCPCS | Performed by: INTERNAL MEDICINE

## 2021-08-04 PROCEDURE — G8432 DEP SCR NOT DOC, RNG: HCPCS | Performed by: INTERNAL MEDICINE

## 2021-08-04 RX ORDER — ONDANSETRON 2 MG/ML
8 INJECTION INTRAMUSCULAR; INTRAVENOUS ONCE
Status: COMPLETED | OUTPATIENT
Start: 2021-08-04 | End: 2021-08-04

## 2021-08-04 RX ORDER — SODIUM CHLORIDE 9 MG/ML
25 INJECTION, SOLUTION INTRAVENOUS CONTINUOUS
Status: DISPENSED | OUTPATIENT
Start: 2021-08-04 | End: 2021-08-04

## 2021-08-04 RX ORDER — DEXAMETHASONE SODIUM PHOSPHATE 10 MG/ML
10 INJECTION INTRAMUSCULAR; INTRAVENOUS ONCE
Status: COMPLETED | OUTPATIENT
Start: 2021-08-04 | End: 2021-08-04

## 2021-08-04 RX ORDER — CETIRIZINE HCL 10 MG
10 TABLET ORAL ONCE
Status: COMPLETED | OUTPATIENT
Start: 2021-08-04 | End: 2021-08-04

## 2021-08-04 RX ORDER — SODIUM CHLORIDE 9 MG/ML
10 INJECTION INTRAMUSCULAR; INTRAVENOUS; SUBCUTANEOUS AS NEEDED
Status: ACTIVE | OUTPATIENT
Start: 2021-08-04 | End: 2021-08-04

## 2021-08-04 RX ORDER — SODIUM CHLORIDE 0.9 % (FLUSH) 0.9 %
10 SYRINGE (ML) INJECTION AS NEEDED
Status: DISPENSED | OUTPATIENT
Start: 2021-08-04 | End: 2021-08-04

## 2021-08-04 RX ORDER — HEPARIN 100 UNIT/ML
300-500 SYRINGE INTRAVENOUS AS NEEDED
Status: ACTIVE | OUTPATIENT
Start: 2021-08-04 | End: 2021-08-04

## 2021-08-04 RX ADMIN — HEPARIN 500 UNITS: 100 SYRINGE at 13:03

## 2021-08-04 RX ADMIN — DEXAMETHASONE SODIUM PHOSPHATE 10 MG: 10 INJECTION, SOLUTION INTRAMUSCULAR; INTRAVENOUS at 10:37

## 2021-08-04 RX ADMIN — SODIUM CHLORIDE 25 ML/HR: 900 INJECTION, SOLUTION INTRAVENOUS at 10:27

## 2021-08-04 RX ADMIN — PACLITAXEL 107 MG: 6 INJECTION, SOLUTION INTRAVENOUS at 11:09

## 2021-08-04 RX ADMIN — CETIRIZINE HYDROCHLORIDE 10 MG: 10 TABLET, FILM COATED ORAL at 10:30

## 2021-08-04 RX ADMIN — Medication 10 ML: at 13:03

## 2021-08-04 RX ADMIN — Medication 10 ML: at 10:27

## 2021-08-04 RX ADMIN — Medication 10 ML: at 08:20

## 2021-08-04 RX ADMIN — FAMOTIDINE 20 MG: 10 INJECTION INTRAVENOUS at 10:31

## 2021-08-04 RX ADMIN — SODIUM CHLORIDE 10 ML: 9 INJECTION INTRAMUSCULAR; INTRAVENOUS; SUBCUTANEOUS at 08:20

## 2021-08-04 RX ADMIN — ONDANSETRON 8 MG: 2 INJECTION INTRAMUSCULAR; INTRAVENOUS at 10:34

## 2021-08-04 NOTE — PROGRESS NOTES
[facility]   Social Work Navigator Encounter     Patient Name:  Marco Gilman \"Luz\"Tawanna     Medical History:  Right Breast Cancer on adjuvant weekly Taxol/Herceptin. Advance Directives: Patient does not have an advanced medical directive, and did not express interest in completing one today. Narrative:   Met with the patient during her office visit today. The patient shared that she has struggled recently, with adjustment to her diagnosis, and treatment side effects. Her Cold Cap treatments have been painful, and chemotherapy side effects have impacted her daily life. She took a break from chemotherapy, and is returning today for treatment. Spoke to the patient regarding self care methods to improve mood and mental health including eating healthy foods, drinking plenty of water, getting time in the sunshine, limiting caffeine and alcohol, exercising, and practicing good sleep hygiene. The patient spoke with the LCSW at [facility] recently. She participates in [employer], from a Reiki master in the community. She completed meditation with the [facility], and plans to start listening to these meditations on a daily basis. She also plans to try virtual yoga and music therapy through the [facility]. The patient continues to go to a daily meeting with AA, and met a fellow cancer patient who is on his third round of cancer as well, which has come as a comfort to her. She is leaning on her sister and niece for support. She also has support from her , daughter, and step-son. Her step-son recently moved in with she and her . Invited the patient to the upcoming Virtual Support Group meetings, led by this . Offered active listening and emotional support to the patient today, and continued support as needed. Barriers to Care:   Challenges with adjustment to diagnosis and treatment side effects     Plan:   1.   Continue to meet with the patient when she returns to the clinic for ongoing assessment of the patients adjustment to her diagnosis and treatment. 2.  Ongoing psychosocial support as desired by patient.        Referral:   Complementary therapies referral  Support Groups referral  Patrica Frye LCSW

## 2021-08-04 NOTE — LETTER
8/4/2021    Patient: Spencer Bass   YOB: 1955   Date of Visit: 8/4/2021     Felice Jim MD  [address] [place] [address]    Dear Felice Jim MD,      Thank you for referring Ms. Spencer Bass to [address] [place] [address] for evaluation. My notes for this consultation are attached. If you have questions, please do not hesitate to call me. I look forward to following your patient along with you.       Sincerely,    Rashida Weir, DO

## 2021-08-04 NOTE — PROGRESS NOTES
[facility] Chemo Progress Note    Date: 2021    Name: Wendi Johnson    MRN: [id number]         : 1955    0800 Ms. Og Wiggins Arrived to [facility] for  Cycle 3 Day 8 Taxol ambulatory in stable condition. Assessment was completed, no acute issues at this time, no new complaints voiced. Port accessed with positive blood return. Labs drawn and sent for processing. Port flushed and capped for MD appointment. 5732 Patient upstairs to MD appointment. 1027 Patient back in [facility]. Port flushed with positive blood return and NS started at [facility]. Chemotherapy Flowsheet 2021   Cycle C3D8   Date 2021   Drug / Regimen Taxol   Pre Meds given   Notes given         Patient denies SOB, fever, cough, general not feeling well. Patient denies recent exposure to someone who has tested positive for COVID-19. Patient denies having contact with anyone who has a pending COVID test.      Ms. Stacy's vitals were reviewed. Patient Vitals for the past 12 hrs:   Temp Pulse Resp BP   21 1306  69  (!) 147/86   21 0802 97.1 °F (36.2 °C) 65 18 133/68         Lab results were obtained and reviewed. Recent Results (from the past 12 hour(s))   CBC WITH AUTOMATED DIFF    Collection Time: 21  8:20 AM   Result Value Ref Range    WBC 5.5 3.6 - 11.0 K/uL    RBC 3.82 3.80 - 5.20 M/uL    HGB 12.1 11.5 - 16.0 g/dL    HCT 37.0 35.0 - 47.0 %    MCV 96.9 80.0 - 99.0 FL    MCH 31.7 26.0 - 34.0 PG    MCHC 32.7 30.0 - 36.5 g/dL    RDW 14.2 11.5 - 14.5 %    PLATELET 744 677 - 308 K/uL    MPV 11.1 8.9 - 12.9 FL    NRBC 0.0 0  WBC    ABSOLUTE NRBC 0.00 0.00 - 0.01 K/uL    NEUTROPHILS 66 32 - 75 %    LYMPHOCYTES 21 12 - 49 %    MONOCYTES 9 5 - 13 %    EOSINOPHILS 2 0 - 7 %    BASOPHILS 1 0 - 1 %    IMMATURE GRANULOCYTES 1 (H) 0.0 - 0.5 %    ABS. NEUTROPHILS 3.6 1.8 - 8.0 K/UL    ABS. LYMPHOCYTES 1.1 0.8 - 3.5 K/UL    ABS. MONOCYTES 0.5 0.0 - 1.0 K/UL    ABS. EOSINOPHILS 0.1 0.0 - 0.4 K/UL    ABS.  BASOPHILS 0.1 0.0 - 0.1 K/UL ABS. IMM. GRANS. 0.0 0.00 - 0.04 K/UL    DF AUTOMATED         Pre-medications  were administered as ordered and chemotherapy was initiated.   Medications Administered     0.9% sodium chloride infusion     Admin Date  08/04/2021 Action  New Bag Dose  25 mL/hr Rate  25 mL/hr Route  IntraVENous Administered By  Nan Hernandez RN          0.9% sodium chloride injection 10 mL     Admin Date  08/04/2021 Action  Given Dose  10 mL Route  IntraVENous Administered By  Nan Hernandez RN          cetirizine (ZYRTEC) tablet 10 mg     Admin Date  08/04/2021 Action  Given Dose  10 mg Route  Oral Administered By  Nan Hernandez RN          dexamethasone (PF) (DECADRON) 10 mg/mL injection 10 mg     Admin Date  08/04/2021 Action  Given Dose  10 mg Route  IntraVENous Administered By  Nan Hernandez RN          famotidine (PF) (PEPCID) 20 mg in 0.9% sodium chloride 10 mL injection     Admin Date  08/04/2021 Action  Given Dose  20 mg Route  IntraVENous Administered By  Nan Hernandez RN          heparin (porcine) pf 300-500 Units     Admin Date  08/04/2021 Action  Given Dose  500 Units Route  InterCATHeter Administered By  Nan Hernandez RN          ondansetron [facility]) injection 8 mg     Admin Date  08/04/2021 Action  Given Dose  8 mg Route  IntraVENous Administered By  Nan Hernandez RN          PACLitaxeL (TAXOL) 107 mg in 0.9% sodium chloride 250 mL, overfill volume 25 mL chemo infusion     Admin Date  08/04/2021 Action  New Bag Dose  107 mg Rate  292.8 mL/hr Route  IntraVENous Administered By  Nan Hernandez RN          sodium chloride (NS) flush 10 mL     Admin Date  08/04/2021 Action  Given Dose  10 mL Route  IntraVENous Administered By  Nan Hernandez RN           Admin Date  08/04/2021 Action  Given Dose  10 mL Route  IntraVENous Administered By  Nan Hernandez RN           Admin Date  08/04/2021 Action  Given Dose  10 mL Route  IntraVENous Administered By  Nan Hernandez RN                  9815 Patient tolerated treatment well. Port maintained positive blood return throughout treatment. Port flushed, heparinized and de accessed per protocol. Patient was discharged from [address] [place] [address] in stable condition.      Future Appointments   Date Time Provider Joseph Workman   8/11/2021  8:00 AM C2 MARTY LONG [place] [facility]   8/17/2021  1:30 PM Oscar Lopez MD PCSCUL BS AMB   8/18/2021  9:00 AM G3 MARTY LONG [address] [facility]   8/18/2021  9:15 AM Quinn Peguero  N [facility] AMB   8/25/2021  9:00 AM G3 MARTY LONG [address] [facility]         Jessenia Abreu RN  August 4, 2021

## 2021-08-04 NOTE — PROGRESS NOTES
Cancer [place] at [address], [address] [place] [address] Road, [address]: [phone]  F: [phone]    Reason for Visit:   Franchesca Arevalo is a 77 y.o. female who is seen today in office for follow up of Right Breast Cancer on adjuvant weekly Taxol/Herceptin. Treatment History:   · Breast biopsy  · Lumpectomy 4/21/21 at [address]  · Adjuvant weekly Taxol/Herceptin 6/16/21 - Current     STAGE:  · T2N0 ER+ HER2 +  · MYRISK negative    History of Present Illness: Franchesca Arevalo is a 77 y.o. female seen today in office for follow up of right breast cancer ER+ HER2+ post lumpectomy on weekly taxol/ herceptin. Pt \"hit a wall last week\". Still wants to do chemo but wants a dose reduction. Doing great overall. Has great family support. No neuropathy. Sees [facility] on [facility]. Labs pending today. No fevers/ chills/ chest pain/ SOB/ nausea/ vomiting/diarrhea/ pain/fatigue          Last visit:  All records at [address] [facility]. She started adjuvant weekly Taxol/Herceptin on 6/16/21. She is here today for Day 15 Cycle 2 (Week 6) of weekly Taxol/Herceptin. She reports that she feels well overall today. She is tolerating treatment well overall with some joint/muscle aches and mild mouth sores. She took an epsom salt bath and took Tylenol which did help some. She states that exercising helps some. Mouth sores, relieved with Magic Mouthwash and salt water rinses. She had one episode of nausea and took Compazine which helped. Her appetite is good and energy levels are lower overall. She denies fever, chills, cough, SOB, CP, vomiting, diarrhea, constipation, and neuropathy. CBC and CMP are still pending today. She is ready for treatment today, is using the Cold Cap during treatment. Her supportive  is here today.     Past Medical History:   Diagnosis Date    Cancer [facility])     cervical and colon    Cervical cancer ([facility] [facility] 1983    Colon cancer ([facility] [address] 2008      Past Surgical History:   Procedure Laterality Date    ENDOSCOPY, COLON, DIAGNOSTIC        Social History     Tobacco Use    Smoking status: Never Smoker    Smokeless tobacco: Never Used   Substance Use Topics    Alcohol use: Never      Family History   Problem Relation Age of Onset    Cancer Mother     Breast Cancer Mother     Cancer Father     Melanoma Father     Breast Cancer Sister     Colon Cancer Brother     Prostate Cancer Brother     Heart Disease Mother     Hypertension Sister      Current Outpatient Medications   Medication Sig    LORazepam (ATIVAN) 0.5 mg tablet Take 1 Tablet by mouth two (2) times daily as needed for Anxiety. Max Daily Amount: 1 mg.  cholecalciferol (VITAMIN D3) (50,000 UNITS /1250 MCG) capsule Take 1 Capsule by mouth every seven (7) days.  aluminum-magnesium hydroxide 200-200 mg/5 mL susp 5 mL, diphenhydrAMINE 12.5 mg/5 mL liqd 12.5 mg, lidocaine 2 % soln 5 mL 5 mL by Swish and Spit route four (4) times daily as needed for Pain. Magic mouth wash   Maalox  Lidocaine 2% viscous     Pharmacy to mix equal portions of ingredients to a total volume as indicated in the dispense amount. Please call in script without Benadryl per patient preference    sertraline (ZOLOFT) 50 mg tablet Take 50 mg by mouth daily.  ondansetron (ZOFRAN ODT) 4 mg disintegrating tablet Take 1-2 Tablets by mouth every eight (8) hours as needed for Nausea or Vomiting.  prochlorperazine (Compazine) 5 mg tablet Take 1 tab by mouth every 6 hours as needed for nausea or vomiting    lidocaine-prilocaine (EMLA) topical cream Apply  to affected area as needed for Pain.  sertraline (ZOLOFT) 50 mg tablet TAKE ONE TABLET BY MOUTH EVERY DAY    azithromycin (ZITHROMAX) 250 mg tablet As dir    amoxicillin-clavulanate (AUGMENTIN) 875-125 mg per tablet Take 1 Tab by mouth every twelve (12) hours.  ibuprofen (MOTRIN) 800 mg tablet Take 1 Tab by mouth three (3) times daily as needed for Pain.     omeprazole (PRILOSEC) 40 mg capsule Take 1 Cap by mouth daily.  aspirin (ASPIRIN) 325 mg tablet Take 325 mg by mouth daily. No current facility-administered medications for this visit. Facility-Administered Medications Ordered in Other Visits   Medication Dose Route Frequency    sodium chloride (NS) flush 10 mL  10 mL IntraVENous PRN    0.9% sodium chloride injection 10 mL  10 mL IntraVENous PRN    heparin (porcine) pf 300-500 Units  300-500 Units InterCATHeter PRN      Allergies   Allergen Reactions    Cipro [Ciprofloxacin Hcl] Nausea and Vomiting    Ciprofloxacin Nausea and Vomiting      Review of Systems:  A complete review of systems was obtained, negative except as described above and as reported on ROS sheet scanned into system. Physical Exam:     Visit Vitals  /68 (BP 1 Location: Left upper arm, BP Patient Position: Sitting)   Pulse 79   Temp 97.1 °F (36.2 °C) (Temporal)   Ht 5' 5\" (1.651 m)   Wt 151 lb 3.2 oz (68.6 kg)   SpO2 97%   BMI 25.16 kg/m²     ECOG PS: 0  General: No distress  Eyes: Anicteric sclerae  HENT: Atraumatic, wearing a mask  Neck: Supple  Respiratory: CTAB, normal respiratory effort  CV: Normal rate, regular rhythm, no murmurs, no peripheral edema  GI: Soft, nontender, nondistended, no masses  MS: Normal gait and station. Digits without clubbing or cyanosis. Skin: No rashes, ecchymoses, or petechiae. Normal temperature, turgor, and texture. Port site without redness/swelling  Psych: Alert, oriented, appropriate affect, normal judgment/insight  Neuro: Non focal  M/S mild swelling right arm with cording    Results:     Lab Results   Component Value Date/Time    WBC 4.5 07/28/2021 09:11 AM    HGB 12.7 07/28/2021 09:11 AM    HCT 38.2 07/28/2021 09:11 AM    PLATELET 424 53/80/6855 09:11 AM    MCV 95.7 07/28/2021 09:11 AM    ABS.  NEUTROPHILS 2.6 07/28/2021 09:11 AM    HGB (POC) 14.3 09/20/2016 09:28 AM    HCT (POC) 43.1 09/20/2016 09:28 AM     Lab Results   Component Value Date/Time    Sodium 139 07/28/2021 09:11 AM Potassium 4.1 07/28/2021 09:11 AM    Chloride 111 (H) 07/28/2021 09:11 AM    CO2 25 07/28/2021 09:11 AM    Glucose 100 07/28/2021 09:11 AM    BUN 14 07/28/2021 09:11 AM    Creatinine 0.72 07/28/2021 09:11 AM    GFR est AA >60 07/28/2021 09:11 AM    GFR est non-AA >60 07/28/2021 09:11 AM    Calcium 9.1 07/28/2021 09:11 AM     Lab Results   Component Value Date/Time    Bilirubin, total 0.3 07/28/2021 09:11 AM    ALT (SGPT) 40 07/28/2021 09:11 AM    Alk. phosphatase 87 07/28/2021 09:11 AM    Protein, total 7.2 07/28/2021 09:11 AM    Albumin 3.8 07/28/2021 09:11 AM    Globulin 3.4 07/28/2021 09:11 AM     All reports from outside facility scanned into media    Records reviewed and summarized above. Pathology report(s) reviewed above. Radiology report(s) reviewed above. Assessment:/PLAN     1) Stage 2 RIGHT Breast Cancer ER+ Her2+ post Lumpectomy 4/21/21  All records at [facility]. No records in our system. Records and history reviewed with patient. Reviewed path and data specifically with patient. Discussed dx, stage and treatment of breast cancer. Patient continues to see Surgery for lumpectomy scar. Discussed adjuvant chemo and hormonal therapy. She had previously seen Dr. Naomy Padgett with [facility]. Most interested in weekly Taxol/Herceptin. She had a pre chemo ECHO on 5/20/21 at [facility] that was good with EF 60-65%- scanned into media. She already has a port. She started weekly Taxol/Herceptin on 6/16/21. She is here today for Day 15 Cycle 2 (Week 6) of weekly Taxol/Herceptin. She is tolerating chemo well overall with some joint/muscle aches, mild mouth sores, and mild nausea. No neuropathy. Discussed chemo side effects overall. Discussed doing taxol dose reduction to 60/mg/m2. D/w pharmacy today. Pt is agreeable to dose reduction. She is clinically stable today and doing well overall. Labs (CBC) reviewed today. Patient is ready for treatment today. She is doing cold cap with chemo.    Next ECHO due by 8/20/21 - ordered today. Follow up in 1 week in [address] [place] [address]. Follow up in 2 weeks in [facility]/office. Patient agrees with plan. 2) Hx of Colon Cancer (2008) and Cervical Cancer (1702)  She did not have chemo. On a course of surveillance. 3) Chemo Induced Mouth Sores  Mild, Grade 1. Controlled with Magic Mouthwash and salt water rinses as needed. Will continue to monitor. 4) Chemo Induced Nausea  Very mild, Grade 1. Controlled with anti-emetics PRN. Will continue to monitor. 5) Management of High Risk Medications - Chemotherapy  Toxicities include Grade 1 Oral Mucositis, Grade 1 Arthralgia, and Grade 1 Nausea. Referred to Acupuncture today for arthralgia. Labs (CBC) reviewed today. Will reduce dose today  Will monitor for side effects. 6) Psychosocial  Mood good. Coping well. She has good family support. SW/NN support as needed. Has supportive     Call if questions. Follow up in 1 week in [facility] and 2 weeks in [facility]/office. I appreciate the opportunity to participate in Ms. Mila Stacy's care.     Signed By: Silvano Valencia DO

## 2021-08-04 NOTE — PROGRESS NOTES
Ivy Max is a 77 y.o. female  Chief Complaint   Patient presents with    Chemotherapy    Breast Cancer     1. Have you been to the ER, urgent care clinic since your last visit? Hospitalized since your last visit? No.    2. Have you seen or consulted any other health care providers outside of the [address] [place] [address] since your last visit? Include any pap smears or colon screening. Yes, patient went to see the Lymphedema clinic. Patient is having pain in her right forearm (looks swollen). Patient had a Lymphatic drain.

## 2021-08-06 DIAGNOSIS — C50.811 MALIGNANT NEOPLASM OF OVERLAPPING SITES OF RIGHT BREAST IN FEMALE, ESTROGEN RECEPTOR POSITIVE (HCC): Primary | ICD-10-CM

## 2021-08-06 DIAGNOSIS — Z51.81 ENCOUNTER FOR MONITORING CARDIOTOXIC DRUG THERAPY: ICD-10-CM

## 2021-08-06 DIAGNOSIS — Z17.0 MALIGNANT NEOPLASM OF OVERLAPPING SITES OF RIGHT BREAST IN FEMALE, ESTROGEN RECEPTOR POSITIVE (HCC): Primary | ICD-10-CM

## 2021-08-06 DIAGNOSIS — Z79.899 ENCOUNTER FOR MONITORING CARDIOTOXIC DRUG THERAPY: ICD-10-CM

## 2021-08-10 DIAGNOSIS — C50.811 MALIGNANT NEOPLASM OF OVERLAPPING SITES OF RIGHT BREAST IN FEMALE, ESTROGEN RECEPTOR POSITIVE (HCC): ICD-10-CM

## 2021-08-10 DIAGNOSIS — E55.9 VITAMIN D DEFICIENCY: ICD-10-CM

## 2021-08-10 DIAGNOSIS — Z17.0 MALIGNANT NEOPLASM OF OVERLAPPING SITES OF RIGHT BREAST IN FEMALE, ESTROGEN RECEPTOR POSITIVE (HCC): ICD-10-CM

## 2021-08-10 RX ORDER — ASPIRIN 325 MG
50000 TABLET, DELAYED RELEASE (ENTERIC COATED) ORAL
Qty: 4 CAPSULE | Refills: 1 | Status: SHIPPED | OUTPATIENT
Start: 2021-08-10 | End: 2021-09-02

## 2021-08-11 ENCOUNTER — HOSPITAL ENCOUNTER (OUTPATIENT)
Dept: INFUSION THERAPY | Age: 66
Discharge: HOME | End: 2021-08-11
Payer: MEDICARE

## 2021-08-11 VITALS
WEIGHT: 150.8 LBS | HEART RATE: 70 BPM | DIASTOLIC BLOOD PRESSURE: 72 MMHG | TEMPERATURE: 96.5 F | SYSTOLIC BLOOD PRESSURE: 140 MMHG | BODY MASS INDEX: 25.12 KG/M2 | RESPIRATION RATE: 16 BRPM | HEIGHT: 65 IN

## 2021-08-11 DIAGNOSIS — Z17.0 MALIGNANT NEOPLASM OF OVERLAPPING SITES OF RIGHT BREAST IN FEMALE, ESTROGEN RECEPTOR POSITIVE (HCC): Primary | ICD-10-CM

## 2021-08-11 DIAGNOSIS — C50.811 MALIGNANT NEOPLASM OF OVERLAPPING SITES OF RIGHT BREAST IN FEMALE, ESTROGEN RECEPTOR POSITIVE (HCC): Primary | ICD-10-CM

## 2021-08-11 LAB
BASOPHILS # BLD: 0.1 K/UL (ref 0–0.1)
BASOPHILS NFR BLD: 1 % (ref 0–1)
DIFFERENTIAL METHOD BLD: ABNORMAL
EOSINOPHIL # BLD: 0.2 K/UL (ref 0–0.4)
EOSINOPHIL NFR BLD: 3 % (ref 0–7)
ERYTHROCYTE [DISTWIDTH] IN BLOOD BY AUTOMATED COUNT: 14.1 % (ref 11.5–14.5)
HCT VFR BLD AUTO: 37.9 % (ref 35–47)
HGB BLD-MCNC: 12.3 G/DL (ref 11.5–16)
IMM GRANULOCYTES # BLD AUTO: 0 K/UL (ref 0–0.04)
IMM GRANULOCYTES NFR BLD AUTO: 1 % (ref 0–0.5)
LYMPHOCYTES # BLD: 1.3 K/UL (ref 0.8–3.5)
LYMPHOCYTES NFR BLD: 20 % (ref 12–49)
MCH RBC QN AUTO: 31.6 PG (ref 26–34)
MCHC RBC AUTO-ENTMCNC: 32.5 G/DL (ref 30–36.5)
MCV RBC AUTO: 97.4 FL (ref 80–99)
MONOCYTES # BLD: 0.5 K/UL (ref 0–1)
MONOCYTES NFR BLD: 7 % (ref 5–13)
NEUTS SEG # BLD: 4.4 K/UL (ref 1.8–8)
NEUTS SEG NFR BLD: 68 % (ref 32–75)
NRBC # BLD: 0 K/UL (ref 0–0.01)
NRBC BLD-RTO: 0 PER 100 WBC
PLATELET # BLD AUTO: 228 K/UL (ref 150–400)
PMV BLD AUTO: 11.3 FL (ref 8.9–12.9)
RBC # BLD AUTO: 3.89 M/UL (ref 3.8–5.2)
WBC # BLD AUTO: 6.4 K/UL (ref 3.6–11)

## 2021-08-11 PROCEDURE — 96375 TX/PRO/DX INJ NEW DRUG ADDON: CPT

## 2021-08-11 PROCEDURE — 77030012965 HC NDL HUBR BBMI -A

## 2021-08-11 PROCEDURE — 74011000250 HC RX REV CODE- 250: Performed by: INTERNAL MEDICINE

## 2021-08-11 PROCEDURE — 96413 CHEMO IV INFUSION 1 HR: CPT

## 2021-08-11 PROCEDURE — 74011250637 HC RX REV CODE- 250/637: Performed by: INTERNAL MEDICINE

## 2021-08-11 PROCEDURE — 74011250636 HC RX REV CODE- 250/636: Performed by: INTERNAL MEDICINE

## 2021-08-11 PROCEDURE — 36415 COLL VENOUS BLD VENIPUNCTURE: CPT

## 2021-08-11 PROCEDURE — 85025 COMPLETE CBC W/AUTO DIFF WBC: CPT

## 2021-08-11 RX ORDER — HEPARIN 100 UNIT/ML
300-500 SYRINGE INTRAVENOUS AS NEEDED
Status: ACTIVE | OUTPATIENT
Start: 2021-08-11 | End: 2021-08-11

## 2021-08-11 RX ORDER — CETIRIZINE HCL 10 MG
10 TABLET ORAL ONCE
Status: COMPLETED | OUTPATIENT
Start: 2021-08-11 | End: 2021-08-11

## 2021-08-11 RX ORDER — SODIUM CHLORIDE 9 MG/ML
25 INJECTION, SOLUTION INTRAVENOUS CONTINUOUS
Status: DISPENSED | OUTPATIENT
Start: 2021-08-11 | End: 2021-08-11

## 2021-08-11 RX ORDER — SODIUM CHLORIDE 9 MG/ML
10 INJECTION INTRAMUSCULAR; INTRAVENOUS; SUBCUTANEOUS AS NEEDED
Status: ACTIVE | OUTPATIENT
Start: 2021-08-11 | End: 2021-08-11

## 2021-08-11 RX ORDER — DEXAMETHASONE SODIUM PHOSPHATE 10 MG/ML
10 INJECTION INTRAMUSCULAR; INTRAVENOUS ONCE
Status: COMPLETED | OUTPATIENT
Start: 2021-08-11 | End: 2021-08-11

## 2021-08-11 RX ORDER — ONDANSETRON 2 MG/ML
8 INJECTION INTRAMUSCULAR; INTRAVENOUS ONCE
Status: COMPLETED | OUTPATIENT
Start: 2021-08-11 | End: 2021-08-11

## 2021-08-11 RX ORDER — SODIUM CHLORIDE 0.9 % (FLUSH) 0.9 %
10 SYRINGE (ML) INJECTION AS NEEDED
Status: DISPENSED | OUTPATIENT
Start: 2021-08-11 | End: 2021-08-11

## 2021-08-11 RX ADMIN — Medication 10 ML: at 08:05

## 2021-08-11 RX ADMIN — ONDANSETRON 8 MG: 2 INJECTION, SOLUTION INTRAMUSCULAR; INTRAVENOUS at 09:06

## 2021-08-11 RX ADMIN — FAMOTIDINE 20 MG: 10 INJECTION INTRAVENOUS at 09:10

## 2021-08-11 RX ADMIN — HEPARIN 500 UNITS: 100 SYRINGE at 12:05

## 2021-08-11 RX ADMIN — SODIUM CHLORIDE 10 ML: 9 INJECTION INTRAMUSCULAR; INTRAVENOUS; SUBCUTANEOUS at 08:05

## 2021-08-11 RX ADMIN — DEXAMETHASONE SODIUM PHOSPHATE 10 MG: 10 INJECTION, SOLUTION INTRAMUSCULAR; INTRAVENOUS at 09:12

## 2021-08-11 RX ADMIN — CETIRIZINE HYDROCHLORIDE 10 MG: 10 TABLET, FILM COATED ORAL at 09:26

## 2021-08-11 RX ADMIN — SODIUM CHLORIDE 25 ML/HR: 900 INJECTION, SOLUTION INTRAVENOUS at 09:01

## 2021-08-11 RX ADMIN — PACLITAXEL 107 MG: 6 INJECTION, SOLUTION INTRAVENOUS at 10:00

## 2021-08-11 NOTE — PROGRESS NOTES
Outpatient Infusion Center - Chemotherapy Progress Note    0800 Pt admit to [facility] for Taxol/C3D15 ambulatory in stable condition accompanied by spouse. Assessment completed. No new concerns voiced. Port accessed with positive blood return. Labs drawn per order and sent. Line flushed, clamped, Curos Cap applied to end clave. Awaiting lab results. Patient denies SOB, fever, cough, general not feeling well. Patient denies recent exposure to someone who has tested positive for COVID-19.  Patient  denies having contact with anyone who has a pending COVID test.       Visit Vitals  /71   Pulse 73   Temp (!) 96.5 °F (35.8 °C)   Resp 16   Ht 5' 5\" (1.651 m)   Wt 68.4 kg (150 lb 12.8 oz)   BMI 25.09 kg/m²       Medications Administered     0.9% sodium chloride infusion     Admin Date  08/11/2021 Action  New Bag Dose  25 mL/hr Rate  25 mL/hr Route  IntraVENous Administered By  Yvette Kinsey RN          0.9% sodium chloride injection 10 mL     Admin Date  08/11/2021 Action  Given Dose  10 mL Route  IntraVENous Administered By  Yvette Kinsey RN          cetirizine (ZYRTEC) tablet 10 mg     Admin Date  08/11/2021 Action  Given Dose  10 mg Route  Oral Administered By  Yvette Kinsey RN          dexamethasone (PF) (DECADRON) 10 mg/mL injection 10 mg     Admin Date  08/11/2021 Action  Given Dose  10 mg Route  IntraVENous Administered By  Yvette Kinsey RN          famotidine (PF) (PEPCID) 20 mg in 0.9% sodium chloride 10 mL injection     Admin Date  08/11/2021 Action  Given Dose  20 mg Route  IntraVENous Administered By  Yvette Kinsey RN          heparin (porcine) pf 300-500 Units     Admin Date  08/11/2021 Action  Given Dose  500 Units Route  InterCATHeter Administered By  Yvette Kinsey RN          ondansetron [facility]) injection 8 mg     Admin Date  08/11/2021 Action  Given Dose  8 mg Route  IntraVENous Administered By  Yvette Kinsey RN          PACLitaxeL (TAXOL) 107 mg in 0.9% sodium chloride 250 mL, overfill volume 25 mL chemo infusion     Admin Date  08/11/2021 Action  New Bag Dose  107 mg Rate  292.8 mL/hr Route  IntraVENous Administered By  Saleem Velazquez RN          sodium chloride (NS) flush 10 mL     Admin Date  08/11/2021 Action  Given Dose  10 mL Route  IntraVENous Administered By  Saleem Velazquez, ESTELLA                  5600 Pt tolerated treatment well. Port maintained positive blood return throughout treatment, flushed with positive blood return at conclusion, heparinized and de-accessed. D/c home ambulatory in no distress. Pt aware of next [facility] [place] appointment scheduled for 08/18/2021. Recent Results (from the past 12 hour(s))   CBC WITH AUTOMATED DIFF    Collection Time: 08/11/21  8:10 AM   Result Value Ref Range    WBC 6.4 3.6 - 11.0 K/uL    RBC 3.89 3.80 - 5.20 M/uL    HGB 12.3 11.5 - 16.0 g/dL    HCT 37.9 35.0 - 47.0 %    MCV 97.4 80.0 - 99.0 FL    MCH 31.6 26.0 - 34.0 PG    MCHC 32.5 30.0 - 36.5 g/dL    RDW 14.1 11.5 - 14.5 %    PLATELET 882 029 - 738 K/uL    MPV 11.3 8.9 - 12.9 FL    NRBC 0.0 0  WBC    ABSOLUTE NRBC 0.00 0.00 - 0.01 K/uL    NEUTROPHILS 68 32 - 75 %    LYMPHOCYTES 20 12 - 49 %    MONOCYTES 7 5 - 13 %    EOSINOPHILS 3 0 - 7 %    BASOPHILS 1 0 - 1 %    IMMATURE GRANULOCYTES 1 (H) 0.0 - 0.5 %    ABS. NEUTROPHILS 4.4 1.8 - 8.0 K/UL    ABS. LYMPHOCYTES 1.3 0.8 - 3.5 K/UL    ABS. MONOCYTES 0.5 0.0 - 1.0 K/UL    ABS. EOSINOPHILS 0.2 0.0 - 0.4 K/UL    ABS. BASOPHILS 0.1 0.0 - 0.1 K/UL    ABS. IMM.  GRANS. 0.0 0.00 - 0.04 K/UL    DF AUTOMATED

## 2021-08-12 RX ORDER — SODIUM CHLORIDE 9 MG/ML
25 INJECTION, SOLUTION INTRAVENOUS CONTINUOUS
Status: CANCELLED | OUTPATIENT
Start: 2021-09-01

## 2021-08-12 RX ORDER — EPINEPHRINE 1 MG/ML
0.3 INJECTION, SOLUTION, CONCENTRATE INTRAVENOUS AS NEEDED
Status: CANCELLED | OUTPATIENT
Start: 2021-08-18

## 2021-08-12 RX ORDER — DIPHENHYDRAMINE HYDROCHLORIDE 50 MG/ML
25 INJECTION, SOLUTION INTRAMUSCULAR; INTRAVENOUS AS NEEDED
Status: CANCELLED
Start: 2021-09-08

## 2021-08-12 RX ORDER — SODIUM CHLORIDE 9 MG/ML
10 INJECTION INTRAMUSCULAR; INTRAVENOUS; SUBCUTANEOUS AS NEEDED
Status: CANCELLED | OUTPATIENT
Start: 2021-09-01

## 2021-08-12 RX ORDER — DIPHENHYDRAMINE HYDROCHLORIDE 50 MG/ML
50 INJECTION, SOLUTION INTRAMUSCULAR; INTRAVENOUS AS NEEDED
Status: CANCELLED
Start: 2021-09-01

## 2021-08-12 RX ORDER — DIPHENHYDRAMINE HYDROCHLORIDE 50 MG/ML
25 INJECTION, SOLUTION INTRAMUSCULAR; INTRAVENOUS AS NEEDED
Status: CANCELLED
Start: 2021-09-01

## 2021-08-12 RX ORDER — SODIUM CHLORIDE 9 MG/ML
10 INJECTION INTRAMUSCULAR; INTRAVENOUS; SUBCUTANEOUS AS NEEDED
Status: CANCELLED | OUTPATIENT
Start: 2021-09-08

## 2021-08-12 RX ORDER — HYDROCORTISONE SODIUM SUCCINATE 100 MG/2ML
100 INJECTION, POWDER, FOR SOLUTION INTRAMUSCULAR; INTRAVENOUS AS NEEDED
Status: CANCELLED | OUTPATIENT
Start: 2021-09-08

## 2021-08-12 RX ORDER — EPINEPHRINE 1 MG/ML
0.3 INJECTION, SOLUTION, CONCENTRATE INTRAVENOUS AS NEEDED
Status: CANCELLED | OUTPATIENT
Start: 2021-09-08

## 2021-08-12 RX ORDER — SODIUM CHLORIDE 0.9 % (FLUSH) 0.9 %
10 SYRINGE (ML) INJECTION AS NEEDED
Status: CANCELLED | OUTPATIENT
Start: 2021-09-08

## 2021-08-12 RX ORDER — DIPHENHYDRAMINE HYDROCHLORIDE 50 MG/ML
50 INJECTION, SOLUTION INTRAMUSCULAR; INTRAVENOUS AS NEEDED
Status: CANCELLED
Start: 2021-09-08

## 2021-08-12 RX ORDER — CETIRIZINE HCL 10 MG
10 TABLET ORAL ONCE
Status: CANCELLED
Start: 2021-08-18 | End: 2021-08-18

## 2021-08-12 RX ORDER — ONDANSETRON 2 MG/ML
8 INJECTION INTRAMUSCULAR; INTRAVENOUS ONCE
Status: CANCELLED
Start: 2021-08-18 | End: 2021-08-18

## 2021-08-12 RX ORDER — HEPARIN 100 UNIT/ML
300-500 SYRINGE INTRAVENOUS AS NEEDED
Status: CANCELLED
Start: 2021-09-08

## 2021-08-12 RX ORDER — DEXAMETHASONE SODIUM PHOSPHATE 4 MG/ML
10 INJECTION, SOLUTION INTRA-ARTICULAR; INTRALESIONAL; INTRAMUSCULAR; INTRAVENOUS; SOFT TISSUE ONCE
Status: CANCELLED | OUTPATIENT
Start: 2021-09-08 | End: 2021-09-01

## 2021-08-12 RX ORDER — ONDANSETRON 2 MG/ML
8 INJECTION INTRAMUSCULAR; INTRAVENOUS ONCE
Status: CANCELLED
Start: 2021-09-01 | End: 2021-08-25

## 2021-08-12 RX ORDER — ONDANSETRON 2 MG/ML
8 INJECTION INTRAMUSCULAR; INTRAVENOUS AS NEEDED
Status: CANCELLED | OUTPATIENT
Start: 2021-08-18

## 2021-08-12 RX ORDER — DIPHENHYDRAMINE HYDROCHLORIDE 50 MG/ML
25 INJECTION, SOLUTION INTRAMUSCULAR; INTRAVENOUS AS NEEDED
Status: CANCELLED
Start: 2021-08-18

## 2021-08-12 RX ORDER — HEPARIN 100 UNIT/ML
300-500 SYRINGE INTRAVENOUS AS NEEDED
Status: CANCELLED
Start: 2021-09-01

## 2021-08-12 RX ORDER — DEXAMETHASONE SODIUM PHOSPHATE 4 MG/ML
10 INJECTION, SOLUTION INTRA-ARTICULAR; INTRALESIONAL; INTRAMUSCULAR; INTRAVENOUS; SOFT TISSUE ONCE
Status: CANCELLED | OUTPATIENT
Start: 2021-08-18 | End: 2021-08-18

## 2021-08-12 RX ORDER — ACETAMINOPHEN 325 MG/1
650 TABLET ORAL AS NEEDED
Status: CANCELLED
Start: 2021-09-08

## 2021-08-12 RX ORDER — ACETAMINOPHEN 325 MG/1
650 TABLET ORAL AS NEEDED
Status: CANCELLED
Start: 2021-08-18

## 2021-08-12 RX ORDER — ONDANSETRON 2 MG/ML
8 INJECTION INTRAMUSCULAR; INTRAVENOUS AS NEEDED
Status: CANCELLED | OUTPATIENT
Start: 2021-09-08

## 2021-08-12 RX ORDER — ACETAMINOPHEN 325 MG/1
650 TABLET ORAL AS NEEDED
Status: CANCELLED
Start: 2021-09-01

## 2021-08-12 RX ORDER — SODIUM CHLORIDE 9 MG/ML
25 INJECTION, SOLUTION INTRAVENOUS CONTINUOUS
Status: CANCELLED | OUTPATIENT
Start: 2021-09-08

## 2021-08-12 RX ORDER — SODIUM CHLORIDE 9 MG/ML
10 INJECTION INTRAMUSCULAR; INTRAVENOUS; SUBCUTANEOUS AS NEEDED
Status: CANCELLED | OUTPATIENT
Start: 2021-08-18

## 2021-08-12 RX ORDER — DEXAMETHASONE SODIUM PHOSPHATE 4 MG/ML
10 INJECTION, SOLUTION INTRA-ARTICULAR; INTRALESIONAL; INTRAMUSCULAR; INTRAVENOUS; SOFT TISSUE ONCE
Status: CANCELLED | OUTPATIENT
Start: 2021-09-01 | End: 2021-08-25

## 2021-08-12 RX ORDER — ONDANSETRON 2 MG/ML
8 INJECTION INTRAMUSCULAR; INTRAVENOUS AS NEEDED
Status: CANCELLED | OUTPATIENT
Start: 2021-09-01

## 2021-08-12 RX ORDER — ONDANSETRON 2 MG/ML
8 INJECTION INTRAMUSCULAR; INTRAVENOUS ONCE
Status: CANCELLED
Start: 2021-09-08 | End: 2021-09-01

## 2021-08-12 RX ORDER — SODIUM CHLORIDE 0.9 % (FLUSH) 0.9 %
10 SYRINGE (ML) INJECTION AS NEEDED
Status: CANCELLED | OUTPATIENT
Start: 2021-09-01

## 2021-08-12 RX ORDER — ALBUTEROL SULFATE 0.83 MG/ML
2.5 SOLUTION RESPIRATORY (INHALATION) AS NEEDED
Status: CANCELLED
Start: 2021-09-01

## 2021-08-12 RX ORDER — CETIRIZINE HCL 10 MG
10 TABLET ORAL ONCE
Status: CANCELLED
Start: 2021-09-08 | End: 2021-09-01

## 2021-08-12 RX ORDER — SODIUM CHLORIDE 9 MG/ML
25 INJECTION, SOLUTION INTRAVENOUS CONTINUOUS
Status: CANCELLED | OUTPATIENT
Start: 2021-08-18

## 2021-08-12 RX ORDER — CETIRIZINE HCL 10 MG
10 TABLET ORAL ONCE
Status: CANCELLED
Start: 2021-09-01 | End: 2021-08-25

## 2021-08-12 RX ORDER — HYDROCORTISONE SODIUM SUCCINATE 100 MG/2ML
100 INJECTION, POWDER, FOR SOLUTION INTRAMUSCULAR; INTRAVENOUS AS NEEDED
Status: CANCELLED | OUTPATIENT
Start: 2021-09-01

## 2021-08-12 RX ORDER — ALBUTEROL SULFATE 0.83 MG/ML
2.5 SOLUTION RESPIRATORY (INHALATION) AS NEEDED
Status: CANCELLED
Start: 2021-08-18

## 2021-08-12 RX ORDER — DIPHENHYDRAMINE HYDROCHLORIDE 50 MG/ML
50 INJECTION, SOLUTION INTRAMUSCULAR; INTRAVENOUS AS NEEDED
Status: CANCELLED
Start: 2021-08-18

## 2021-08-12 RX ORDER — HYDROCORTISONE SODIUM SUCCINATE 100 MG/2ML
100 INJECTION, POWDER, FOR SOLUTION INTRAMUSCULAR; INTRAVENOUS AS NEEDED
Status: CANCELLED | OUTPATIENT
Start: 2021-08-18

## 2021-08-12 RX ORDER — EPINEPHRINE 1 MG/ML
0.3 INJECTION, SOLUTION, CONCENTRATE INTRAVENOUS AS NEEDED
Status: CANCELLED | OUTPATIENT
Start: 2021-09-01

## 2021-08-12 RX ORDER — ALBUTEROL SULFATE 0.83 MG/ML
2.5 SOLUTION RESPIRATORY (INHALATION) AS NEEDED
Status: CANCELLED
Start: 2021-09-08

## 2021-08-17 ENCOUNTER — OFFICE VISIT (OUTPATIENT)
Dept: PALLATIVE CARE | Age: 66
End: 2021-08-17

## 2021-08-17 DIAGNOSIS — F41.8 ANXIETY ABOUT HEALTH: Primary | ICD-10-CM

## 2021-08-17 PROCEDURE — 97810 ACUP 1/> WO ESTIM 1ST 15 MIN: CPT | Performed by: PHYSICAL MEDICINE & REHABILITATION

## 2021-08-17 NOTE — PROGRESS NOTES
Palliative Medicine and Integrative Medicine Acupuncture Note    Date Current Visit: 8/17/2021 VISIT 1  Date Initial Visit: 8/17/21  Requesting Physician: Phi Alberto    Summary: Anxiety about cancer tx    Subjective: Felicita Shankar is a 77 y.o. female w/ a hx of cervical cancer in 1983, colon cancer in 2008 and R breast cancer s/p lumpectomy 4/2021 at [facility] and receiving adjuvant chemo w/ Dr Phi Alberto- Taxol/Herceptin. With her other cancer dx, did not require chemo or radiation- so more anxious about the treatment than the actual cancer dx. Tolerating tx well- very mild nausea, no neuropathy, and keeping her energy up w/ good diet and regular exercise. At times at night feels like her \"whole body has restless leg syndrome\"     Has very supportive family including a niece in the area who had breast cancer in 2019 and went to acupuncturist Bijal Garza. She is seeing Blu Norwalk for meditation from the [facility] and a Reiki master. Social: From [place], went to school at [employer]. Supportive . Has a dtr and son- one in [place] [address] and the other in [place]. One 5 siblings- cancer runs in their family but so does longevity.           Past Medical History:   Past Medical History:   Diagnosis Date    Cancer ([address]     cervical and colon    Cervical cancer ([address] 1983    Colon cancer ([address] 2008        Past Surgical history:   Past Surgical History:   Procedure Laterality Date    ENDOSCOPY, COLON, DIAGNOSTIC          Family History:. Family History   Problem Relation Age of Onset    Cancer Mother     Breast Cancer Mother     Cancer Father     Melanoma Father     Breast Cancer Sister     Colon Cancer Brother     Prostate Cancer Brother     Heart Disease Mother     Hypertension Sister          ROS:   ROS    The following systems were reviewed: constitutional, ears/nose/mouth/throat, respiratory, gastrointestinal, musculoskeletal, neurologic, psychiatric, endocrine. Positive findings noted below.     Per above     Social history:   Social History     Socioeconomic History    Marital status:      Spouse name: Not on file    Number of children: Not on file    Years of education: Not on file    Highest education level: Not on file   Occupational History    Not on file   Tobacco Use    Smoking status: Never Smoker    Smokeless tobacco: Never Used   Vaping Use    Vaping Use: Never used   Substance and Sexual Activity    Alcohol use: Never    Drug use: Never    Sexual activity: Never   Other Topics Concern    Not on file   Social History Narrative    ** Merged History Encounter **          Social Determinants of Health     Financial Resource Strain:     Difficulty of Paying Living Expenses:    Food Insecurity:     Worried About Running Out of Food in the Last Year:     [address] in the Last Year:    Transportation Needs:     Lack of Transportation (Medical):  Lack of Transportation (Non-Medical):    Physical Activity:     Days of Exercise per Week:     Minutes of Exercise per Session:    Stress:     Feeling of Stress :    Social Connections: Unknown    Frequency of Communication with Friends and Family: More than three times a week    Frequency of Social Gatherings with Friends and Family: Not on file    Attends Anglican Services: Not on file   [employer]s or Organizations:  Yes    Attends Club or Organization Meetings: More than 4 times per year    Marital Status:    Intimate Partner Violence:     Fear of Current or Ex-Partner:     Emotionally Abused:     Physically Abused:     Sexually Abused:         Physical Exam:     Labs reviewed from 8/11/21    Objective:     General appearance:  No apparent distress, well nourished, well groomed   HEENT: PERRLA, EOMI, nares clear, moist mucous membranes   Lungs: Clear to auscultation bilaterally   Abd:  +BS, soft, NTTP   Skin: Warm, dry   Psych:  Normal affect, appropriate   Msk Gait strong and balanced, grossly intact                Assessment and Plan:       ICD-10-CM ICD-9-CM    1. Anxiety about health  F41.8 300.09         Treatment Plan:   -Goals: Improved pain, function   -Type of acupuncture and number of treatments planned   -Other modalities or referrals       Acupuncture treatment performed after written and verbal consent obtained. Patient aware that risk include, but are not limited to: pain during needle insertion; bleeding/bruising; infections; internal organ perforation. All questions were answered. Time out performed immediately prior to the start of the procedure. Patient identified by name and date of birth and agreement on procedure being performed was verified. ~~~~~~~ ~~~~~~~~~~~~~~~~~~~~    *Tolerates needling well  *Uses donut hole of table  *Likes fan   *After first tx felt \"lighter\"    1. General well being and anxiety about health while undergoing chemotherapy: Supportive family, coping well and minimal side effects from chemo. - Dragons: External: GV20, BL 11, BL 23, BL 61. 7 needles inserted x 10 minutes. Internal: CV 14', ST25, ST 32, ST41. 7 needles inserted x 10 minutes.        ~~~~~~~~~~~~~~~~~~~~~~~~~~~    Pt tolerated procedure well without apparent complications. Pt advised to avoid strenuous sexual activity, exercise, heavy meals, alcohol for the next 24 hours. Aware that rebound effect may occur following treatment but that should improve back to baseline in several days.

## 2021-08-18 ENCOUNTER — HOSPITAL ENCOUNTER (OUTPATIENT)
Dept: INFUSION THERAPY | Age: 66
Discharge: HOME | End: 2021-08-18
Payer: MEDICARE

## 2021-08-18 ENCOUNTER — TELEPHONE (OUTPATIENT)
Dept: ONCOLOGY | Age: 66
End: 2021-08-18

## 2021-08-18 VITALS
RESPIRATION RATE: 20 BRPM | TEMPERATURE: 96.2 F | SYSTOLIC BLOOD PRESSURE: 146 MMHG | DIASTOLIC BLOOD PRESSURE: 87 MMHG | HEART RATE: 74 BPM

## 2021-08-18 DIAGNOSIS — Z17.0 MALIGNANT NEOPLASM OF OVERLAPPING SITES OF RIGHT BREAST IN FEMALE, ESTROGEN RECEPTOR POSITIVE (HCC): Primary | ICD-10-CM

## 2021-08-18 DIAGNOSIS — C50.811 MALIGNANT NEOPLASM OF OVERLAPPING SITES OF RIGHT BREAST IN FEMALE, ESTROGEN RECEPTOR POSITIVE (HCC): Primary | ICD-10-CM

## 2021-08-18 LAB
ANION GAP SERPL CALC-SCNC: 3 MMOL/L (ref 5–15)
BASOPHILS # BLD: 0.1 K/UL (ref 0–0.1)
BASOPHILS NFR BLD: 1 % (ref 0–1)
BUN SERPL-MCNC: 13 MG/DL (ref 6–20)
BUN/CREAT SERPL: 18 (ref 12–20)
CALCIUM SERPL-MCNC: 8.8 MG/DL (ref 8.5–10.1)
CHLORIDE SERPL-SCNC: 112 MMOL/L (ref 97–108)
CO2 SERPL-SCNC: 26 MMOL/L (ref 21–32)
CREAT SERPL-MCNC: 0.72 MG/DL (ref 0.55–1.02)
DIFFERENTIAL METHOD BLD: ABNORMAL
EOSINOPHIL # BLD: 0.2 K/UL (ref 0–0.4)
EOSINOPHIL NFR BLD: 5 % (ref 0–7)
ERYTHROCYTE [DISTWIDTH] IN BLOOD BY AUTOMATED COUNT: 14.4 % (ref 11.5–14.5)
GLUCOSE SERPL-MCNC: 94 MG/DL (ref 65–100)
HCT VFR BLD AUTO: 38.7 % (ref 35–47)
HGB BLD-MCNC: 12.5 G/DL (ref 11.5–16)
IMM GRANULOCYTES # BLD AUTO: 0 K/UL (ref 0–0.04)
IMM GRANULOCYTES NFR BLD AUTO: 1 % (ref 0–0.5)
LYMPHOCYTES # BLD: 1.1 K/UL (ref 0.8–3.5)
LYMPHOCYTES NFR BLD: 27 % (ref 12–49)
MCH RBC QN AUTO: 31.5 PG (ref 26–34)
MCHC RBC AUTO-ENTMCNC: 32.3 G/DL (ref 30–36.5)
MCV RBC AUTO: 97.5 FL (ref 80–99)
MONOCYTES # BLD: 0.3 K/UL (ref 0–1)
MONOCYTES NFR BLD: 8 % (ref 5–13)
NEUTS SEG # BLD: 2.3 K/UL (ref 1.8–8)
NEUTS SEG NFR BLD: 58 % (ref 32–75)
NRBC # BLD: 0 K/UL (ref 0–0.01)
NRBC BLD-RTO: 0 PER 100 WBC
PLATELET # BLD AUTO: 234 K/UL (ref 150–400)
PMV BLD AUTO: 10.8 FL (ref 8.9–12.9)
POTASSIUM SERPL-SCNC: 3.9 MMOL/L (ref 3.5–5.1)
RBC # BLD AUTO: 3.97 M/UL (ref 3.8–5.2)
SODIUM SERPL-SCNC: 141 MMOL/L (ref 136–145)
WBC # BLD AUTO: 4 K/UL (ref 3.6–11)

## 2021-08-18 PROCEDURE — 80048 BASIC METABOLIC PNL TOTAL CA: CPT

## 2021-08-18 PROCEDURE — 85025 COMPLETE CBC W/AUTO DIFF WBC: CPT

## 2021-08-18 PROCEDURE — 36591 DRAW BLOOD OFF VENOUS DEVICE: CPT

## 2021-08-18 PROCEDURE — 77030012965 HC NDL HUBR BBMI -A

## 2021-08-18 PROCEDURE — 74011250636 HC RX REV CODE- 250/636: Performed by: INTERNAL MEDICINE

## 2021-08-18 RX ORDER — SODIUM CHLORIDE 0.9 % (FLUSH) 0.9 %
10 SYRINGE (ML) INJECTION AS NEEDED
Status: DISPENSED | OUTPATIENT
Start: 2021-08-18 | End: 2021-08-18

## 2021-08-18 RX ORDER — HEPARIN 100 UNIT/ML
300-500 SYRINGE INTRAVENOUS AS NEEDED
Status: ACTIVE | OUTPATIENT
Start: 2021-08-18 | End: 2021-08-18

## 2021-08-18 RX ADMIN — HEPARIN 500 UNITS: 100 SYRINGE at 08:10

## 2021-08-18 RX ADMIN — Medication 10 ML: at 08:10

## 2021-08-18 NOTE — PROGRESS NOTES
[facility] Short Note                       Date: 2021    Name: Spencer Bass    MRN: [id number]         : 1955      0800 Pt admit to [facility] for Taxol/Herceptin [place] ambulatory in stable condition. Assessment completed. Patient did have recent exposure to a person with covid, provider holding treatment. Patient was sent for covid testing and will notify office with results to reschedule appointment. Ms. Debbora Schirmer vitals were reviewed prior to and after treatment. Patient Vitals for the past 12 hrs:   Temp Pulse BP   21 0803 (!) 96.2 °F (35.7 °C) 74 (!) 146/87         Lab results were obtained and reviewed. Recent Results (from the past 12 hour(s))   CBC WITH AUTOMATED DIFF    Collection Time: 21  8:11 AM   Result Value Ref Range    WBC 4.0 3.6 - 11.0 K/uL    RBC 3.97 3.80 - 5.20 M/uL    HGB 12.5 11.5 - 16.0 g/dL    HCT 38.7 35.0 - 47.0 %    MCV 97.5 80.0 - 99.0 FL    MCH 31.5 26.0 - 34.0 PG    MCHC 32.3 30.0 - 36.5 g/dL    RDW 14.4 11.5 - 14.5 %    PLATELET 527 180 - 010 K/uL    MPV 10.8 8.9 - 12.9 FL    NRBC 0.0 0  WBC    ABSOLUTE NRBC 0.00 0.00 - 0.01 K/uL    NEUTROPHILS 58 32 - 75 %    LYMPHOCYTES 27 12 - 49 %    MONOCYTES 8 5 - 13 %    EOSINOPHILS 5 0 - 7 %    BASOPHILS 1 0 - 1 %    IMMATURE GRANULOCYTES 1 (H) 0.0 - 0.5 %    ABS. NEUTROPHILS 2.3 1.8 - 8.0 K/UL    ABS. LYMPHOCYTES 1.1 0.8 - 3.5 K/UL    ABS. MONOCYTES 0.3 0.0 - 1.0 K/UL    ABS. EOSINOPHILS 0.2 0.0 - 0.4 K/UL    ABS. BASOPHILS 0.1 0.0 - 0.1 K/UL    ABS. IMM.  GRANS. 0.0 0.00 - 0.04 K/UL    DF AUTOMATED           Future Appointments   Date Time Provider Joseph Workman   2021 11:30 AM ECHO LAB [facility]   2021  9:00 AM [facility] [place] [facility]   2021  9:00 AM Mechelle Eisenberg MD PCSCUL BS AMB   2021  9:00 AM [facility] [place] [facility]   2021  8:30 AM H1 MARTY GOINSCK [facility]ICB [facility]   2021 10:00 AM Mechelle Eisenberg MD PCSCUL BS AMB   2021 9:00 AM G2 MARTY [address]   10/5/2021 10:00 AM Kyree Ann MD PCSCUL BS AMB   10/20/2021  8:30 AM [facility]   11/10/2021  8:30 AM [address]1 [address] Pierce Garcia RN  August 18, 2021  8:36 AM

## 2021-08-18 NOTE — TELEPHONE ENCOUNTER
Patient called and left a voicemail stating that she went to [place] after she left [facility] and got a Covid-19 Test. Patient received the results and it was negative. Patient just wanted to provide an update to Dr. Peter Deluna and Dagoberto.      [facility]

## 2021-08-18 NOTE — TELEPHONE ENCOUNTER
Spoke with [facility] - no availability for rescheduling treatment until 730 am on Monday 8/23/21. Called [facility] - available appointment at 10 am tomorrow. Called patient to offer [facility] appointment at 10 am tomorrow. No answer. LM to call office back.

## 2021-08-19 ENCOUNTER — TELEPHONE (OUTPATIENT)
Dept: PALLATIVE CARE | Age: 66
End: 2021-08-19

## 2021-08-20 ENCOUNTER — OFFICE VISIT (OUTPATIENT)
Dept: URGENT CARE | Age: 66
End: 2021-08-20
Payer: MEDICARE

## 2021-08-20 VITALS — HEART RATE: 77 BPM | RESPIRATION RATE: 20 BRPM | OXYGEN SATURATION: 96 % | TEMPERATURE: 98.5 F

## 2021-08-20 DIAGNOSIS — Z20.822 EXPOSURE TO COVID-19 VIRUS: Primary | ICD-10-CM

## 2021-08-20 PROCEDURE — G9711 PT HX TOT COL OR COLON CA: HCPCS | Performed by: FAMILY MEDICINE

## 2021-08-20 PROCEDURE — G8432 DEP SCR NOT DOC, RNG: HCPCS | Performed by: FAMILY MEDICINE

## 2021-08-20 PROCEDURE — 99202 OFFICE O/P NEW SF 15 MIN: CPT | Performed by: FAMILY MEDICINE

## 2021-08-20 PROCEDURE — G8427 DOCREV CUR MEDS BY ELIG CLIN: HCPCS | Performed by: FAMILY MEDICINE

## 2021-08-20 PROCEDURE — G9899 SCRN MAM PERF RSLTS DOC: HCPCS | Performed by: FAMILY MEDICINE

## 2021-08-20 PROCEDURE — G8536 NO DOC ELDER MAL SCRN: HCPCS | Performed by: FAMILY MEDICINE

## 2021-08-20 PROCEDURE — G8419 CALC BMI OUT NRM PARAM NOF/U: HCPCS | Performed by: FAMILY MEDICINE

## 2021-08-20 PROCEDURE — 1101F PT FALLS ASSESS-DOCD LE1/YR: CPT | Performed by: FAMILY MEDICINE

## 2021-08-20 PROCEDURE — G8400 PT W/DXA NO RESULTS DOC: HCPCS | Performed by: FAMILY MEDICINE

## 2021-08-20 PROCEDURE — 1090F PRES/ABSN URINE INCON ASSESS: CPT | Performed by: FAMILY MEDICINE

## 2021-08-20 RX ORDER — ONDANSETRON 2 MG/ML
8 INJECTION INTRAMUSCULAR; INTRAVENOUS ONCE
Status: CANCELLED
Start: 2021-08-25 | End: 2021-08-25

## 2021-08-20 RX ORDER — ONDANSETRON 2 MG/ML
8 INJECTION INTRAMUSCULAR; INTRAVENOUS AS NEEDED
Status: CANCELLED | OUTPATIENT
Start: 2021-08-25

## 2021-08-20 RX ORDER — DIPHENHYDRAMINE HYDROCHLORIDE 50 MG/ML
25 INJECTION, SOLUTION INTRAMUSCULAR; INTRAVENOUS AS NEEDED
Status: CANCELLED
Start: 2021-08-25

## 2021-08-20 RX ORDER — CETIRIZINE HCL 10 MG
10 TABLET ORAL ONCE
Status: CANCELLED
Start: 2021-08-25 | End: 2021-08-25

## 2021-08-20 RX ORDER — SODIUM CHLORIDE 9 MG/ML
25 INJECTION, SOLUTION INTRAVENOUS CONTINUOUS
Status: CANCELLED | OUTPATIENT
Start: 2021-08-25

## 2021-08-20 RX ORDER — SODIUM CHLORIDE 0.9 % (FLUSH) 0.9 %
10 SYRINGE (ML) INJECTION AS NEEDED
Status: CANCELLED | OUTPATIENT
Start: 2021-08-25

## 2021-08-20 RX ORDER — ALBUTEROL SULFATE 0.83 MG/ML
2.5 SOLUTION RESPIRATORY (INHALATION) AS NEEDED
Status: CANCELLED
Start: 2021-08-25

## 2021-08-20 RX ORDER — ACETAMINOPHEN 325 MG/1
650 TABLET ORAL AS NEEDED
Status: CANCELLED
Start: 2021-08-25

## 2021-08-20 RX ORDER — DEXAMETHASONE SODIUM PHOSPHATE 4 MG/ML
10 INJECTION, SOLUTION INTRA-ARTICULAR; INTRALESIONAL; INTRAMUSCULAR; INTRAVENOUS; SOFT TISSUE ONCE
Status: CANCELLED | OUTPATIENT
Start: 2021-08-25 | End: 2021-08-25

## 2021-08-20 RX ORDER — HYDROCORTISONE SODIUM SUCCINATE 100 MG/2ML
100 INJECTION, POWDER, FOR SOLUTION INTRAMUSCULAR; INTRAVENOUS AS NEEDED
Status: CANCELLED | OUTPATIENT
Start: 2021-08-25

## 2021-08-20 RX ORDER — SODIUM CHLORIDE 9 MG/ML
10 INJECTION INTRAMUSCULAR; INTRAVENOUS; SUBCUTANEOUS AS NEEDED
Status: CANCELLED | OUTPATIENT
Start: 2021-08-25

## 2021-08-20 RX ORDER — HEPARIN 100 UNIT/ML
300-500 SYRINGE INTRAVENOUS AS NEEDED
Status: CANCELLED | OUTPATIENT
Start: 2021-08-25

## 2021-08-20 RX ORDER — DIPHENHYDRAMINE HYDROCHLORIDE 50 MG/ML
50 INJECTION, SOLUTION INTRAMUSCULAR; INTRAVENOUS AS NEEDED
Status: CANCELLED
Start: 2021-08-25

## 2021-08-20 RX ORDER — EPINEPHRINE 1 MG/ML
0.3 INJECTION, SOLUTION, CONCENTRATE INTRAVENOUS AS NEEDED
Status: CANCELLED | OUTPATIENT
Start: 2021-08-25

## 2021-08-20 NOTE — PROGRESS NOTES
This patient was seen at [address] [place] [address] Urgent Care while in their vehicle due to COVID-19 pandemic with PPE and focused examination in order to decrease community viral transmission. The patient/guardian gave verbal consent to treat. Felicita Shankar is a 77 y.o. female who presents for COVID-19 testing. Was exposed to COVID-19 6 days ago. Denies cough, fever, SOB, N/V/D. Needs negative PCR test in order to resume Chemo. The history is provided by the patient. Past Medical History:   Diagnosis Date    Cancer [facility])     cervical and colon    Cervical cancer ([facility] [address] 1983    Colon cancer ([facility] [address] 2008        Past Surgical History:   Procedure Laterality Date    ENDOSCOPY, COLON, DIAGNOSTIC           Family History   Problem Relation Age of Onset    Cancer Mother     Breast Cancer Mother     Cancer Father     Melanoma Father     Breast Cancer Sister     Colon Cancer Brother     Prostate Cancer Brother     Heart Disease Mother     Hypertension Sister         Social History     Socioeconomic History    Marital status:      Spouse name: Not on file    Number of children: Not on file    Years of education: Not on file    Highest education level: Not on file   Occupational History    Not on file   Tobacco Use    Smoking status: Never Smoker    Smokeless tobacco: Never Used   Vaping Use    Vaping Use: Never used   Substance and Sexual Activity    Alcohol use: Never    Drug use: Never    Sexual activity: Never   Other Topics Concern    Not on file   Social History Narrative    ** Merged History Encounter **          Social Determinants of Health     Financial Resource Strain:     Difficulty of Paying Living Expenses:    Food Insecurity:     Worried About Running Out of Food in the Last Year:     Ran Out of Food in the Last Year:    Transportation Needs:     Lack of Transportation (Medical):      Lack of Transportation (Non-Medical):    Physical Activity:     Days of Exercise per Week:     Minutes of Exercise per Session:    Stress:     Feeling of Stress :    Social Connections: Unknown    Frequency of Communication with Friends and Family: More than three times a week    Frequency of Social Gatherings with Friends and Family: Not on file    Attends Confucianist Services: Not on file   [employer]s or Organizations: Yes    Attends Club or Organization Meetings: More than 4 times per year    Marital Status:    Intimate Partner Violence:     Fear of Current or Ex-Partner:     Emotionally Abused:     Physically Abused:     Sexually Abused: ALLERGIES: Cipro [ciprofloxacin hcl], Benadryl allergy decongestant, and Ciprofloxacin    Review of Systems   Constitutional: Negative for activity change, appetite change and fever. Respiratory: Negative for cough and shortness of breath. Gastrointestinal: Negative for diarrhea, nausea and vomiting. Vitals:    08/20/21 1120   Pulse: 77   Resp: 20   Temp: 98.5 °F (36.9 °C)   SpO2: 96%       Physical Exam  Vitals and nursing note reviewed. Constitutional:       General: She is not in acute distress. Appearance: She is well-developed. She is not diaphoretic. Pulmonary:      Effort: Pulmonary effort is normal.   Neurological:      Mental Status: She is alert. Psychiatric:         Behavior: Behavior normal.         Thought Content: Thought content normal.         Judgment: Judgment normal.         MDM    ICD-10-CM ICD-9-CM   1.  Exposure to COVID-19 virus  Z20.822 V01.79       Orders Placed This Encounter    NOVEL CORONAVIRUS (COVID-19)     Scheduling Instructions:      1) Due to current limited availability of the COVID-19 PCR test, tests will be prioritized and may not be completed.              2) Order only if the test result will change clinical management or necessary for a return to mission-critical employment decision.              3) Print and instruct patient to adhere to CDC home isolation program. (Link Above)              4) Set up or refer patient for a monitoring program.              5) Have patient sign up for and leverage MyChart (if not previously done). Order Specific Question:   Is this test for diagnosis or screening? Answer:   Screening     Order Specific Question:   Symptomatic for COVID-19 as defined by CDC? Answer:   No     Order Specific Question:   Hospitalized for COVID-19? Answer:   No     Order Specific Question:   Admitted to ICU for COVID-19? Answer:   No     Order Specific Question:   Employed in healthcare setting? Answer:   Unknown     Order Specific Question:   Resident in a congregate (group) care setting? Answer:   No     Order Specific Question:   Pregnant? Answer:   No     Order Specific Question:   Previously tested for COVID-19? Answer:   Unknown        Mask in public  Maintain Social distancing  Await results.   MyChart: active  Provider will call if test is positive         Procedures

## 2021-08-22 LAB
SARS-COV-2, NAA 2 DAY TAT: NORMAL
SARS-COV-2, NAA: NOT DETECTED

## 2021-08-23 ENCOUNTER — HOSPITAL ENCOUNTER (OUTPATIENT)
Dept: NON INVASIVE DIAGNOSTICS | Age: 66
Discharge: HOME | End: 2021-08-23
Attending: INTERNAL MEDICINE
Payer: MEDICARE

## 2021-08-23 VITALS
WEIGHT: 150 LBS | BODY MASS INDEX: 24.99 KG/M2 | SYSTOLIC BLOOD PRESSURE: 140 MMHG | DIASTOLIC BLOOD PRESSURE: 80 MMHG | HEIGHT: 65 IN

## 2021-08-23 DIAGNOSIS — Z79.899 ENCOUNTER FOR MONITORING CARDIOTOXIC DRUG THERAPY: ICD-10-CM

## 2021-08-23 DIAGNOSIS — Z51.81 ENCOUNTER FOR MONITORING CARDIOTOXIC DRUG THERAPY: ICD-10-CM

## 2021-08-23 DIAGNOSIS — C50.811 MALIGNANT NEOPLASM OF OVERLAPPING SITES OF RIGHT BREAST IN FEMALE, ESTROGEN RECEPTOR POSITIVE (HCC): ICD-10-CM

## 2021-08-23 DIAGNOSIS — Z17.0 MALIGNANT NEOPLASM OF OVERLAPPING SITES OF RIGHT BREAST IN FEMALE, ESTROGEN RECEPTOR POSITIVE (HCC): ICD-10-CM

## 2021-08-23 LAB
ECHO AO ROOT DIAM: 3.23 CM
ECHO AR MAX VEL PISA: 317.82 CM/S
ECHO AV AREA PEAK VELOCITY: 2.94 CM2
ECHO AV AREA/BSA PEAK VELOCITY: 1.7 CM2/M2
ECHO AV PEAK GRADIENT: 6.59 MMHG
ECHO AV PEAK VELOCITY: 128.39 CM/S
ECHO AV REGURGITANT PHT: 789.82 MS
ECHO LA AREA 4C: 11.48 CM2
ECHO LA MAJOR AXIS: 3.3 CM
ECHO LA MINOR AXIS: 1.89 CM
ECHO LA VOL 2C: 35.47 ML (ref 22–52)
ECHO LA VOL 4C: 21.13 ML (ref 22–52)
ECHO LA VOL BP: 29.46 ML (ref 22–52)
ECHO LA VOL/BSA BIPLANE: 16.83 ML/M2 (ref 16–28)
ECHO LA VOLUME INDEX A2C: 20.27 ML/M2 (ref 16–28)
ECHO LA VOLUME INDEX A4C: 12.07 ML/M2 (ref 16–28)
ECHO LV EDV A2C: 94.61 ML
ECHO LV EDV A4C: 86.96 ML
ECHO LV EDV BP: 94.35 ML (ref 56–104)
ECHO LV EDV INDEX A4C: 49.7 ML/M2
ECHO LV EDV INDEX BP: 53.9 ML/M2
ECHO LV EDV NDEX A2C: 54.1 ML/M2
ECHO LV EJECTION FRACTION A2C: 50 PERCENT
ECHO LV EJECTION FRACTION A4C: 57 PERCENT
ECHO LV EJECTION FRACTION BIPLANE: 51.4 PERCENT (ref 55–100)
ECHO LV ESV A2C: 47.77 ML
ECHO LV ESV A4C: 37.08 ML
ECHO LV ESV BP: 45.86 ML (ref 19–49)
ECHO LV ESV INDEX A2C: 27.3 ML/M2
ECHO LV ESV INDEX A4C: 21.2 ML/M2
ECHO LV ESV INDEX BP: 26.2 ML/M2
ECHO LV GLOBAL LONGITUDINAL STRAIN (GLS): -15.4 PERCENT
ECHO LV INTERNAL DIMENSION DIASTOLIC: 4.15 CM (ref 3.9–5.3)
ECHO LV INTERNAL DIMENSION SYSTOLIC: 3.39 CM
ECHO LV IVSD: 1.02 CM (ref 0.6–0.9)
ECHO LV IVSS: 1.03 CM
ECHO LV MASS 2D: 127.2 G (ref 67–162)
ECHO LV MASS INDEX 2D: 72.7 G/M2 (ref 43–95)
ECHO LV POSTERIOR WALL DIASTOLIC: 0.9 CM (ref 0.6–0.9)
ECHO LV POSTERIOR WALL SYSTOLIC: 1.25 CM
ECHO LVOT DIAM: 2.26 CM
ECHO LVOT PEAK GRADIENT: 3.56 MMHG
ECHO LVOT PEAK VELOCITY: 94.36 CM/S
ECHO MV A VELOCITY: 60.28 CM/S
ECHO MV E DECELERATION TIME (DT): 323.82 MS
ECHO MV E VELOCITY: 46.31 CM/S
ECHO MV E/A RATIO: 0.77
ECHO PV PEAK INSTANTANEOUS GRADIENT SYSTOLIC: 3.51 MMHG
ECHO RV INTERNAL DIMENSION: 3.41 CM
ECHO RV TAPSE: 1.67 CM (ref 1.5–2)
ECHO TV REGURGITANT MAX VELOCITY: 200.69 CM/S
ECHO TV REGURGITANT PEAK GRADIENT: 16.11 MMHG
GLOBAL LONGITUDINAL STRAIN 2 CHAMBER: -15.7 PERCENT
GLOBAL LONGITUDINAL STRAIN 4 CHAMBER: -15.6 PERCENT
GLOBAL LONGITUDINAL STRAIN LONG AXIS: -15 PERCENT
LA VOL DISK BP: 27.39 ML (ref 22–52)

## 2021-08-23 PROCEDURE — 93306 TTE W/DOPPLER COMPLETE: CPT

## 2021-08-25 ENCOUNTER — HOSPITAL ENCOUNTER (OUTPATIENT)
Dept: INFUSION THERAPY | Age: 66
Discharge: HOME | End: 2021-08-25
Payer: MEDICARE

## 2021-08-25 VITALS
SYSTOLIC BLOOD PRESSURE: 126 MMHG | HEIGHT: 65 IN | BODY MASS INDEX: 25.09 KG/M2 | RESPIRATION RATE: 18 BRPM | DIASTOLIC BLOOD PRESSURE: 74 MMHG | WEIGHT: 150.6 LBS | TEMPERATURE: 97.1 F | HEART RATE: 72 BPM

## 2021-08-25 DIAGNOSIS — R93.1 ABNORMAL ECHOCARDIOGRAM: Primary | ICD-10-CM

## 2021-08-25 DIAGNOSIS — C50.811 MALIGNANT NEOPLASM OF OVERLAPPING SITES OF RIGHT BREAST IN FEMALE, ESTROGEN RECEPTOR POSITIVE (HCC): Primary | ICD-10-CM

## 2021-08-25 DIAGNOSIS — Z17.0 MALIGNANT NEOPLASM OF OVERLAPPING SITES OF RIGHT BREAST IN FEMALE, ESTROGEN RECEPTOR POSITIVE (HCC): Primary | ICD-10-CM

## 2021-08-25 LAB
ALBUMIN SERPL-MCNC: 3.6 G/DL (ref 3.5–5)
ALBUMIN/GLOB SERPL: 1 {RATIO} (ref 1.1–2.2)
ALP SERPL-CCNC: 101 U/L (ref 45–117)
ALT SERPL-CCNC: 25 U/L (ref 12–78)
ANION GAP SERPL CALC-SCNC: 4 MMOL/L (ref 5–15)
AST SERPL-CCNC: 11 U/L (ref 15–37)
BASOPHILS # BLD: 0.1 K/UL (ref 0–0.1)
BASOPHILS NFR BLD: 1 % (ref 0–1)
BILIRUB SERPL-MCNC: 0.4 MG/DL (ref 0.2–1)
BUN SERPL-MCNC: 15 MG/DL (ref 6–20)
BUN/CREAT SERPL: 23 (ref 12–20)
CALCIUM SERPL-MCNC: 9.2 MG/DL (ref 8.5–10.1)
CHLORIDE SERPL-SCNC: 110 MMOL/L (ref 97–108)
CO2 SERPL-SCNC: 28 MMOL/L (ref 21–32)
CREAT SERPL-MCNC: 0.64 MG/DL (ref 0.55–1.02)
DIFFERENTIAL METHOD BLD: NORMAL
EOSINOPHIL # BLD: 0.1 K/UL (ref 0–0.4)
EOSINOPHIL NFR BLD: 2 % (ref 0–7)
ERYTHROCYTE [DISTWIDTH] IN BLOOD BY AUTOMATED COUNT: 14.1 % (ref 11.5–14.5)
GLOBULIN SER CALC-MCNC: 3.5 G/DL (ref 2–4)
GLUCOSE SERPL-MCNC: 96 MG/DL (ref 65–100)
HCT VFR BLD AUTO: 37.7 % (ref 35–47)
HGB BLD-MCNC: 12.6 G/DL (ref 11.5–16)
IMM GRANULOCYTES # BLD AUTO: 0 K/UL (ref 0–0.04)
IMM GRANULOCYTES NFR BLD AUTO: 0 % (ref 0–0.5)
LYMPHOCYTES # BLD: 1.2 K/UL (ref 0.8–3.5)
LYMPHOCYTES NFR BLD: 21 % (ref 12–49)
MCH RBC QN AUTO: 32.1 PG (ref 26–34)
MCHC RBC AUTO-ENTMCNC: 33.4 G/DL (ref 30–36.5)
MCV RBC AUTO: 95.9 FL (ref 80–99)
MONOCYTES # BLD: 0.6 K/UL (ref 0–1)
MONOCYTES NFR BLD: 11 % (ref 5–13)
NEUTS SEG # BLD: 3.7 K/UL (ref 1.8–8)
NEUTS SEG NFR BLD: 65 % (ref 32–75)
NRBC # BLD: 0 K/UL (ref 0–0.01)
NRBC BLD-RTO: 0 PER 100 WBC
PLATELET # BLD AUTO: 231 K/UL (ref 150–400)
PMV BLD AUTO: 11 FL (ref 8.9–12.9)
POTASSIUM SERPL-SCNC: 3.8 MMOL/L (ref 3.5–5.1)
PROT SERPL-MCNC: 7.1 G/DL (ref 6.4–8.2)
RBC # BLD AUTO: 3.93 M/UL (ref 3.8–5.2)
SODIUM SERPL-SCNC: 142 MMOL/L (ref 136–145)
WBC # BLD AUTO: 5.8 K/UL (ref 3.6–11)

## 2021-08-25 PROCEDURE — 96401 CHEMO ANTI-NEOPL SQ/IM: CPT

## 2021-08-25 PROCEDURE — 74011250636 HC RX REV CODE- 250/636: Performed by: INTERNAL MEDICINE

## 2021-08-25 PROCEDURE — 85025 COMPLETE CBC W/AUTO DIFF WBC: CPT

## 2021-08-25 PROCEDURE — 74011250637 HC RX REV CODE- 250/637: Performed by: INTERNAL MEDICINE

## 2021-08-25 PROCEDURE — 96413 CHEMO IV INFUSION 1 HR: CPT

## 2021-08-25 PROCEDURE — 80053 COMPREHEN METABOLIC PANEL: CPT

## 2021-08-25 PROCEDURE — 77030012965 HC NDL HUBR BBMI -A

## 2021-08-25 PROCEDURE — 96375 TX/PRO/DX INJ NEW DRUG ADDON: CPT

## 2021-08-25 PROCEDURE — 74011000250 HC RX REV CODE- 250: Performed by: INTERNAL MEDICINE

## 2021-08-25 PROCEDURE — 36415 COLL VENOUS BLD VENIPUNCTURE: CPT

## 2021-08-25 RX ORDER — CETIRIZINE HCL 10 MG
10 TABLET ORAL ONCE
Status: COMPLETED | OUTPATIENT
Start: 2021-08-25 | End: 2021-08-25

## 2021-08-25 RX ORDER — ONDANSETRON 2 MG/ML
8 INJECTION INTRAMUSCULAR; INTRAVENOUS ONCE
Status: COMPLETED | OUTPATIENT
Start: 2021-08-25 | End: 2021-08-25

## 2021-08-25 RX ORDER — HEPARIN 100 UNIT/ML
300-500 SYRINGE INTRAVENOUS AS NEEDED
Status: ACTIVE | OUTPATIENT
Start: 2021-08-25 | End: 2021-08-25

## 2021-08-25 RX ORDER — DEXAMETHASONE SODIUM PHOSPHATE 10 MG/ML
10 INJECTION INTRAMUSCULAR; INTRAVENOUS ONCE
Status: COMPLETED | OUTPATIENT
Start: 2021-08-25 | End: 2021-08-25

## 2021-08-25 RX ORDER — SODIUM CHLORIDE 9 MG/ML
25 INJECTION, SOLUTION INTRAVENOUS CONTINUOUS
Status: DISPENSED | OUTPATIENT
Start: 2021-08-25 | End: 2021-08-25

## 2021-08-25 RX ORDER — SODIUM CHLORIDE 0.9 % (FLUSH) 0.9 %
10 SYRINGE (ML) INJECTION AS NEEDED
Status: DISPENSED | OUTPATIENT
Start: 2021-08-25 | End: 2021-08-25

## 2021-08-25 RX ADMIN — TRASTUZUMAB AND HYALURONIDASE-OYSK 600 MG: 600; 10000 INJECTION, SOLUTION SUBCUTANEOUS at 10:40

## 2021-08-25 RX ADMIN — SODIUM CHLORIDE 25 ML/HR: 900 INJECTION, SOLUTION INTRAVENOUS at 10:39

## 2021-08-25 RX ADMIN — HEPARIN 500 UNITS: 100 SYRINGE at 13:59

## 2021-08-25 RX ADMIN — ONDANSETRON 8 MG: 2 INJECTION INTRAMUSCULAR; INTRAVENOUS at 10:53

## 2021-08-25 RX ADMIN — CETIRIZINE HYDROCHLORIDE 10 MG: 10 TABLET, FILM COATED ORAL at 10:53

## 2021-08-25 RX ADMIN — DEXAMETHASONE SODIUM PHOSPHATE 10 MG: 10 INJECTION, SOLUTION INTRAMUSCULAR; INTRAVENOUS at 10:53

## 2021-08-25 RX ADMIN — Medication 10 ML: at 13:59

## 2021-08-25 RX ADMIN — FAMOTIDINE 20 MG: 10 INJECTION INTRAVENOUS at 10:53

## 2021-08-25 RX ADMIN — PACLITAXEL 107 MG: 6 INJECTION, SOLUTION INTRAVENOUS at 11:45

## 2021-08-25 NOTE — PROGRESS NOTES
UPDATE:    MA has faxed referral to [facility] to fax number: [phone], fax confirmation has been received back! Patient should be receiving a call from this office to be seen for her Abnormal ECHO results once records are reviewed and received. Will follow up.   Constance Orta

## 2021-08-25 NOTE — PROGRESS NOTES
[facility] Chemo Progress Note    Date: 2021    Name: Narinder Wiggins    MRN: [id number]         : 1955    0855 Ms. Jesús Cuenca Arrived to [facility] for Taxol/Herceptin Hylecta ambulatory in stable condition. Assessment was completed, no acute issues at this time, no new complaints voiced. Port accessed with positive blood return. Labs drawn and sent for processing. Chemotherapy Flowsheet 2021   Cycle C4D1   Date 2021   Drug / Regimen Taxol/Herceptin Hylecta   Pre Meds given   Notes Given in Right Thigh         Patient denies SOB, fever, cough, general not feeling well. Patient denies recent exposure to someone who has tested positive for COVID-19. Patient denies having contact with anyone who has a pending COVID test.      Ms. Stacy's vitals were reviewed. Patient Vitals for the past 12 hrs:   Temp Pulse Resp BP   21 1359    126/74   21 0858 97.1 °F (36.2 °C) 72 18 135/73         Lab results were obtained and reviewed. Recent Results (from the past 12 hour(s))   CBC WITH AUTOMATED DIFF    Collection Time: 21  9:14 AM   Result Value Ref Range    WBC 5.8 3.6 - 11.0 K/uL    RBC 3.93 3.80 - 5.20 M/uL    HGB 12.6 11.5 - 16.0 g/dL    HCT 37.7 35.0 - 47.0 %    MCV 95.9 80.0 - 99.0 FL    MCH 32.1 26.0 - 34.0 PG    MCHC 33.4 30.0 - 36.5 g/dL    RDW 14.1 11.5 - 14.5 %    PLATELET 333 713 - 676 K/uL    MPV 11.0 8.9 - 12.9 FL    NRBC 0.0 0  WBC    ABSOLUTE NRBC 0.00 0.00 - 0.01 K/uL    NEUTROPHILS 65 32 - 75 %    LYMPHOCYTES 21 12 - 49 %    MONOCYTES 11 5 - 13 %    EOSINOPHILS 2 0 - 7 %    BASOPHILS 1 0 - 1 %    IMMATURE GRANULOCYTES 0 0.0 - 0.5 %    ABS. NEUTROPHILS 3.7 1.8 - 8.0 K/UL    ABS. LYMPHOCYTES 1.2 0.8 - 3.5 K/UL    ABS. MONOCYTES 0.6 0.0 - 1.0 K/UL    ABS. EOSINOPHILS 0.1 0.0 - 0.4 K/UL    ABS. BASOPHILS 0.1 0.0 - 0.1 K/UL    ABS. IMM.  GRANS. 0.0 0.00 - 0.04 K/UL    DF AUTOMATED     METABOLIC PANEL, COMPREHENSIVE    Collection Time: 21  9:14 AM   Result Value Ref Range    Sodium 142 136 - 145 mmol/L    Potassium 3.8 3.5 - 5.1 mmol/L    Chloride 110 (H) 97 - 108 mmol/L    CO2 28 21 - 32 mmol/L    Anion gap 4 (L) 5 - 15 mmol/L    Glucose 96 65 - 100 mg/dL    BUN 15 6 - 20 MG/DL    Creatinine 0.64 0.55 - 1.02 MG/DL    BUN/Creatinine ratio 23 (H) 12 - 20      GFR est AA >60 >60 ml/min/1.73m2    GFR est non-AA >60 >60 ml/min/1.73m2    Calcium 9.2 8.5 - 10.1 MG/DL    Bilirubin, total 0.4 0.2 - 1.0 MG/DL    ALT (SGPT) 25 12 - 78 U/L    AST (SGOT) 11 (L) 15 - 37 U/L    Alk. phosphatase 101 45 - 117 U/L    Protein, total 7.1 6.4 - 8.2 g/dL    Albumin 3.6 3.5 - 5.0 g/dL    Globulin 3.5 2.0 - 4.0 g/dL    A-G Ratio 1.0 (L) 1.1 - 2.2         Pre-medications  were administered as ordered and chemotherapy was initiated.   Medications Administered     0.9% sodium chloride infusion     Admin Date  08/25/2021 Action  New Bag Dose  25 mL/hr Rate  25 mL/hr Route  IntraVENous Administered By  Venkat España RN          cetirizine (ZYRTEC) tablet 10 mg     Admin Date  08/25/2021 Action  Given Dose  10 mg Route  Oral Administered By  Venkat España RN          dexamethasone (PF) (DECADRON) 10 mg/mL injection 10 mg     Admin Date  08/25/2021 Action  Given Dose  10 mg Route  IntraVENous Administered By  Venkat España RN          famotidine (PF) (PEPCID) 20 mg in 0.9% sodium chloride 10 mL injection     Admin Date  08/25/2021 Action  Given Dose  20 mg Route  IntraVENous Administered By  Venkat España RN          heparin (porcine) pf 300-500 Units     Admin Date  08/25/2021 Action  Given Dose  500 Units Route  InterCATHeter Administered By  Venkat España RN          ondansetron [facility]) injection 8 mg     Admin Date  08/25/2021 Action  Given Dose  8 mg Route  IntraVENous Administered By  Venkat Taco, RN          PACLitaxeL (TAXOL) 107 mg in 0.9% sodium chloride 250 mL, overfill volume 25 mL chemo infusion     Admin Date  08/25/2021 Action  New Bag Dose  107 mg Rate  292.8 mL/hr Route  IntraVENous Administered By  Kaia Mariee RN          sodium chloride (NS) flush 10 mL     Admin Date  08/25/2021 Action  Given Dose  10 mL Route  IntraVENous Administered By  Kaia Mariee RN          trastuzumab-hyaluronidase-[facility]) injection 600 mg     Admin Date  08/25/2021 Action  Given Dose  600 mg Route  SubCUTAneous Administered By  Kaia Mariee RN                  3231 Patient tolerated treatment well. Port maintained positive blood return throughout treatment. Port flushed, heparinized and de accessed per protocol.  Patient was discharged from     Future Appointments   Date Time Provider Joseph Workman   8/31/2021  9:00 AM Delphine Sampson MD PCSCUL BS AMB   9/1/2021  9:00 AM G3 MARTY [facility] [address] [place] [address] H   9/8/2021  8:30 AM H1 MARTY [facility]   9/21/2021 10:00 AM Delphine Sampson MD PCSSANDROL BS AMB   9/29/2021  9:00 AM G2 MARTY [facility]   10/5/2021 10:00 AM Delphine Sampson MD PCSSANDROL BS AMB   10/20/2021  8:30 AM H1 MARTY [facility] H   11/10/2021  8:30 AM G1 MARTY [facility], RN  August 25, 2021

## 2021-08-27 ENCOUNTER — TELEPHONE (OUTPATIENT)
Dept: CARDIOLOGY CLINIC | Age: 66
End: 2021-08-27

## 2021-08-27 NOTE — TELEPHONE ENCOUNTER
Needs a new patient appt mary/Elena Panchal, NP/ Dx: abn Echo. August 31 @1:00 is available/[place] location.

## 2021-08-30 ENCOUNTER — DOCUMENTATION ONLY (OUTPATIENT)
Dept: ONCOLOGY | Age: 66
End: 2021-08-30

## 2021-08-30 NOTE — PROGRESS NOTES
UPDATE:    8/27/21-MA had faxed Cardiology referral to the [address] [place] [address] to fax number: [phone]! Fax confirmation was received back! This patient should be receiving a call from this office to be seen for Abnormal ECHO results! Patient was also given office information through TSB for this office if not contacted. Today (8/30/21) Patient is now scheduled to see Dr. Hiwot Dietz at the Cardiovascular office on 9/7/21 at 10:00am.  Referral is now CLOSED.   Yanick Herrmann

## 2021-09-01 ENCOUNTER — HOSPITAL ENCOUNTER (OUTPATIENT)
Dept: INFUSION THERAPY | Age: 66
Discharge: HOME | End: 2021-09-01
Payer: MEDICARE

## 2021-09-01 VITALS
BODY MASS INDEX: 25.06 KG/M2 | RESPIRATION RATE: 18 BRPM | WEIGHT: 150.4 LBS | HEIGHT: 65 IN | HEART RATE: 75 BPM | TEMPERATURE: 96.7 F | DIASTOLIC BLOOD PRESSURE: 74 MMHG | SYSTOLIC BLOOD PRESSURE: 145 MMHG

## 2021-09-01 DIAGNOSIS — Z17.0 MALIGNANT NEOPLASM OF OVERLAPPING SITES OF RIGHT BREAST IN FEMALE, ESTROGEN RECEPTOR POSITIVE (HCC): Primary | ICD-10-CM

## 2021-09-01 DIAGNOSIS — C50.811 MALIGNANT NEOPLASM OF OVERLAPPING SITES OF RIGHT BREAST IN FEMALE, ESTROGEN RECEPTOR POSITIVE (HCC): Primary | ICD-10-CM

## 2021-09-01 LAB
BASOPHILS # BLD: 0.1 K/UL (ref 0–0.1)
BASOPHILS NFR BLD: 1 % (ref 0–1)
DIFFERENTIAL METHOD BLD: ABNORMAL
EOSINOPHIL # BLD: 0.2 K/UL (ref 0–0.4)
EOSINOPHIL NFR BLD: 3 % (ref 0–7)
ERYTHROCYTE [DISTWIDTH] IN BLOOD BY AUTOMATED COUNT: 14.4 % (ref 11.5–14.5)
HCT VFR BLD AUTO: 37.1 % (ref 35–47)
HGB BLD-MCNC: 12.5 G/DL (ref 11.5–16)
IMM GRANULOCYTES # BLD AUTO: 0 K/UL (ref 0–0.04)
IMM GRANULOCYTES NFR BLD AUTO: 1 % (ref 0–0.5)
LYMPHOCYTES # BLD: 1.2 K/UL (ref 0.8–3.5)
LYMPHOCYTES NFR BLD: 24 % (ref 12–49)
MCH RBC QN AUTO: 32.2 PG (ref 26–34)
MCHC RBC AUTO-ENTMCNC: 33.7 G/DL (ref 30–36.5)
MCV RBC AUTO: 95.6 FL (ref 80–99)
MONOCYTES # BLD: 0.4 K/UL (ref 0–1)
MONOCYTES NFR BLD: 7 % (ref 5–13)
NEUTS SEG # BLD: 3.3 K/UL (ref 1.8–8)
NEUTS SEG NFR BLD: 64 % (ref 32–75)
NRBC # BLD: 0 K/UL (ref 0–0.01)
NRBC BLD-RTO: 0 PER 100 WBC
PLATELET # BLD AUTO: 221 K/UL (ref 150–400)
PMV BLD AUTO: 11 FL (ref 8.9–12.9)
RBC # BLD AUTO: 3.88 M/UL (ref 3.8–5.2)
WBC # BLD AUTO: 5.2 K/UL (ref 3.6–11)

## 2021-09-01 PROCEDURE — 85025 COMPLETE CBC W/AUTO DIFF WBC: CPT

## 2021-09-01 PROCEDURE — 74011250637 HC RX REV CODE- 250/637: Performed by: INTERNAL MEDICINE

## 2021-09-01 PROCEDURE — 96413 CHEMO IV INFUSION 1 HR: CPT

## 2021-09-01 PROCEDURE — 74011250636 HC RX REV CODE- 250/636: Performed by: INTERNAL MEDICINE

## 2021-09-01 PROCEDURE — 77030012965 HC NDL HUBR BBMI -A

## 2021-09-01 PROCEDURE — 36415 COLL VENOUS BLD VENIPUNCTURE: CPT

## 2021-09-01 PROCEDURE — 96375 TX/PRO/DX INJ NEW DRUG ADDON: CPT

## 2021-09-01 PROCEDURE — 74011000250 HC RX REV CODE- 250: Performed by: INTERNAL MEDICINE

## 2021-09-01 RX ORDER — SODIUM CHLORIDE 0.9 % (FLUSH) 0.9 %
10 SYRINGE (ML) INJECTION AS NEEDED
Status: DISPENSED | OUTPATIENT
Start: 2021-09-01 | End: 2021-09-01

## 2021-09-01 RX ORDER — SODIUM CHLORIDE 9 MG/ML
25 INJECTION, SOLUTION INTRAVENOUS CONTINUOUS
Status: DISPENSED | OUTPATIENT
Start: 2021-09-01 | End: 2021-09-01

## 2021-09-01 RX ORDER — CETIRIZINE HCL 10 MG
10 TABLET ORAL ONCE
Status: COMPLETED | OUTPATIENT
Start: 2021-09-01 | End: 2021-09-01

## 2021-09-01 RX ORDER — ONDANSETRON 2 MG/ML
8 INJECTION INTRAMUSCULAR; INTRAVENOUS ONCE
Status: COMPLETED | OUTPATIENT
Start: 2021-09-01 | End: 2021-09-01

## 2021-09-01 RX ORDER — SODIUM CHLORIDE 9 MG/ML
10 INJECTION INTRAMUSCULAR; INTRAVENOUS; SUBCUTANEOUS AS NEEDED
Status: ACTIVE | OUTPATIENT
Start: 2021-09-01 | End: 2021-09-01

## 2021-09-01 RX ORDER — HEPARIN 100 UNIT/ML
300-500 SYRINGE INTRAVENOUS AS NEEDED
Status: ACTIVE | OUTPATIENT
Start: 2021-09-01 | End: 2021-09-01

## 2021-09-01 RX ORDER — DEXAMETHASONE SODIUM PHOSPHATE 10 MG/ML
10 INJECTION INTRAMUSCULAR; INTRAVENOUS ONCE
Status: COMPLETED | OUTPATIENT
Start: 2021-09-01 | End: 2021-09-01

## 2021-09-01 RX ADMIN — HEPARIN 500 UNITS: 100 SYRINGE at 12:34

## 2021-09-01 RX ADMIN — Medication 10 ML: at 12:34

## 2021-09-01 RX ADMIN — ONDANSETRON 8 MG: 2 INJECTION INTRAMUSCULAR; INTRAVENOUS at 09:48

## 2021-09-01 RX ADMIN — Medication 10 ML: at 09:07

## 2021-09-01 RX ADMIN — DEXAMETHASONE SODIUM PHOSPHATE 10 MG: 10 INJECTION, SOLUTION INTRAMUSCULAR; INTRAVENOUS at 09:52

## 2021-09-01 RX ADMIN — CETIRIZINE HYDROCHLORIDE 10 MG: 10 TABLET, FILM COATED ORAL at 10:04

## 2021-09-01 RX ADMIN — PACLITAXEL 107 MG: 6 INJECTION, SOLUTION INTRAVENOUS at 10:32

## 2021-09-01 RX ADMIN — FAMOTIDINE 20 MG: 10 INJECTION, SOLUTION INTRAVENOUS at 09:50

## 2021-09-01 RX ADMIN — SODIUM CHLORIDE 25 ML/HR: 900 INJECTION, SOLUTION INTRAVENOUS at 09:45

## 2021-09-01 RX ADMIN — SODIUM CHLORIDE 10 ML: 9 INJECTION INTRAMUSCULAR; INTRAVENOUS; SUBCUTANEOUS at 09:07

## 2021-09-01 NOTE — PROGRESS NOTES
[facility]patient [facility]fusion [facility] - Chemotherapy Progress Note    0900 Pt admit to [facility] for Taxol/C4 D8 ambulatory in stable condition accompanied by spouse. Assessment completed. No new concerns voiced. Port accessed with positive blood return. Labs drawn per order and sent. Line flushed, clamped, Curos Cap applied to end clave. Awaiting lab results.       Visit Vitals  BP (!) 145/74   Pulse 75   Temp (!) 96.7 °F (35.9 °C)   Resp 18   Ht 5' 5\" (1.651 m)   Wt 68.2 kg (150 lb 6.4 oz)   Breastfeeding No   BMI 25.03 kg/m²       Medications Administered     0.9% sodium chloride infusion     Admin Date  09/01/2021 Action  New Bag Dose  25 mL/hr Rate  25 mL/hr Route  IntraVENous Administered By  Raleigh Dejesus RN          0.9% sodium chloride injection 10 mL     Admin Date  09/01/2021 Action  Given Dose  10 mL Route  IntraVENous Administered By  Raleigh Dejesus RN          cetirizine (ZYRTEC) tablet 10 mg     Admin Date  09/01/2021 Action  Given Dose  10 mg Route  Oral Administered By  Raleigh Dejesus RN          dexamethasone (PF) (DECADRON) 10 mg/mL injection 10 mg     Admin Date  09/01/2021 Action  Given Dose  10 mg Route  IntraVENous Administered By  Raleigh Dejesus RN          famotidine (PF) (PEPCID) 20 mg in 0.9% sodium chloride 10 mL injection     Admin Date  09/01/2021 Action  Given Dose  20 mg Route  IntraVENous Administered By  Raleigh Dejesus RN          heparin (porcine) pf 300-500 Units     Admin Date  09/01/2021 Action  Given Dose  500 Units Route  InterCATHeter Administered By  Raleigh Dejesus RN          [facility]ansetron [facility] injection 8 mg     Admin Date  09/01/2021 Action  Given Dose  8 mg Route  IntraVENous Administered By  Raleigh Dejseus RN          PACLitaxeL (TAXOL) 107 mg in 0.9% sodium chloride 250 mL, overfill volume 25 mL chemo infusion     Admin Date  09/01/2021 Action  New Bag Dose  107 mg Rate  292.8 mL/hr Route  IntraVENous Administered By  Raleigh Dejesus RN          sodium chloride (NS) flush 10 mL     Admin Date  09/01/2021 Action  Given Dose  10 mL Route  IntraVENous Administered By  Johnny Martínez, RN           Admin Date  09/01/2021 Action  Given Dose  10 mL Route  IntraVENous Administered By  Johnny Martínez, RN                        1230 Pt tolerated treatment well. Port maintained positive blood return throughout treatment, flushed with positive blood return at conclusion, heparinized and de-accessed. D/c home ambulatory in no distress. Pt aware of next [facility] appointment scheduled for 09/8/2021. Recent Results (from the past 12 hour(s))   CBC WITH AUTOMATED DIFF    Collection Time: 09/01/21  9:07 AM   Result Value Ref Range    WBC 5.2 3.6 - 11.0 K/uL    RBC 3.88 3.80 - 5.20 M/uL    HGB 12.5 11.5 - 16.0 g/dL    HCT 37.1 35.0 - 47.0 %    MCV 95.6 80.0 - 99.0 FL    MCH 32.2 26.0 - 34.0 PG    MCHC 33.7 30.0 - 36.5 g/dL    RDW 14.4 11.5 - 14.5 %    PLATELET 035 991 - 859 K/uL    MPV 11.0 8.9 - 12.9 FL    NRBC 0.0 0  WBC    ABSOLUTE NRBC 0.00 0.00 - 0.01 K/uL    NEUTROPHILS 64 32 - 75 %    LYMPHOCYTES 24 12 - 49 %    MONOCYTES 7 5 - 13 %    EOSINOPHILS 3 0 - 7 %    BASOPHILS 1 0 - 1 %    IMMATURE GRANULOCYTES 1 (H) 0.0 - 0.5 %    ABS. NEUTROPHILS 3.3 1.8 - 8.0 K/UL    ABS. LYMPHOCYTES 1.2 0.8 - 3.5 K/UL    ABS. MONOCYTES 0.4 0.0 - 1.0 K/UL    ABS. EOSINOPHILS 0.2 0.0 - 0.4 K/UL    ABS. BASOPHILS 0.1 0.0 - 0.1 K/UL    ABS. IMM.  GRANS. 0.0 0.00 - 0.04 K/UL    DF AUTOMATED

## 2021-09-02 DIAGNOSIS — C50.811 MALIGNANT NEOPLASM OF OVERLAPPING SITES OF RIGHT BREAST IN FEMALE, ESTROGEN RECEPTOR POSITIVE (HCC): ICD-10-CM

## 2021-09-02 DIAGNOSIS — Z17.0 MALIGNANT NEOPLASM OF OVERLAPPING SITES OF RIGHT BREAST IN FEMALE, ESTROGEN RECEPTOR POSITIVE (HCC): ICD-10-CM

## 2021-09-02 DIAGNOSIS — E55.9 VITAMIN D DEFICIENCY: ICD-10-CM

## 2021-09-02 RX ORDER — ASPIRIN 325 MG
50000 TABLET, DELAYED RELEASE (ENTERIC COATED) ORAL
Qty: 4 CAPSULE | Refills: 1 | Status: SHIPPED | OUTPATIENT
Start: 2021-09-02 | End: 2021-09-29

## 2021-09-07 ENCOUNTER — OFFICE VISIT (OUTPATIENT)
Dept: CARDIOLOGY CLINIC | Age: 66
End: 2021-09-07
Payer: MEDICARE

## 2021-09-07 VITALS
WEIGHT: 151 LBS | HEIGHT: 65 IN | OXYGEN SATURATION: 97 % | DIASTOLIC BLOOD PRESSURE: 80 MMHG | HEART RATE: 87 BPM | RESPIRATION RATE: 16 BRPM | SYSTOLIC BLOOD PRESSURE: 130 MMHG | BODY MASS INDEX: 25.16 KG/M2

## 2021-09-07 DIAGNOSIS — Z17.0 MALIGNANT NEOPLASM OF OVERLAPPING SITES OF RIGHT BREAST IN FEMALE, ESTROGEN RECEPTOR POSITIVE (HCC): ICD-10-CM

## 2021-09-07 DIAGNOSIS — C50.811 MALIGNANT NEOPLASM OF OVERLAPPING SITES OF RIGHT BREAST IN FEMALE, ESTROGEN RECEPTOR POSITIVE (HCC): ICD-10-CM

## 2021-09-07 DIAGNOSIS — Z79.899 ENCOUNTER FOR MONITORING CARDIOTOXIC DRUG THERAPY: Primary | ICD-10-CM

## 2021-09-07 DIAGNOSIS — Z51.81 ENCOUNTER FOR MONITORING CARDIOTOXIC DRUG THERAPY: Primary | ICD-10-CM

## 2021-09-07 PROCEDURE — G0463 HOSPITAL OUTPT CLINIC VISIT: HCPCS | Performed by: SPECIALIST

## 2021-09-07 PROCEDURE — 93005 ELECTROCARDIOGRAM TRACING: CPT | Performed by: SPECIALIST

## 2021-09-07 PROCEDURE — 93010 ELECTROCARDIOGRAM REPORT: CPT | Performed by: SPECIALIST

## 2021-09-07 PROCEDURE — 1090F PRES/ABSN URINE INCON ASSESS: CPT | Performed by: SPECIALIST

## 2021-09-07 PROCEDURE — 99204 OFFICE O/P NEW MOD 45 MIN: CPT | Performed by: SPECIALIST

## 2021-09-07 NOTE — PATIENT INSTRUCTIONS
You have been scheduled for a limited echo. We will call with results or send them via [employer] and see you back in 3 months with a full echocardiogram a few days prior.

## 2021-09-07 NOTE — Clinical Note
9/7/2021    Patient: Rosaline Kirkland   YOB: 1955   Date of Visit: 9/7/2021     Dodie Mcadams MD  [address] [place]    Dear Dodie Mcadams MD,      Thank you for referring Ms. Rosaline Kirkland to [facility] for evaluation. My notes for this consultation are attached. If you have questions, please do not hesitate to call me. I look forward to following your patient along with you.       Sincerely,    Harley Giordano MD

## 2021-09-07 NOTE — PROGRESS NOTES
Rory Melendez     1955       Heaven Wills MD, [facility]  Date of Visit-9/7/2021   PCP is Anselmo Bernheim, MD   [facility]  [facility]  HPI:  Rory Melendez is a 77 y.o. female   Pt is referred by Mike De La Vega because of an abnormal echo. Echo on 8/23/21 showed a normal EF. The global strain was normal at -15. Pt with previous stress echo in 2013 that was normal. She is on her ceftin therapy. She had cervical cancer in 1983, colon cancer in 2008, now breast cancer. She had a lobectomy on April 1st, 2021. Pt states that she is tired often from chemotherapy, but notes that she is constantly exercising every day and is in good shape. She reports feeling \"wiped out\" on Friday afternoons. Pt notes that she has been in chemo for three months and was told her last appointment is tomorrow, but is questioning about more chemo. Pt states that the chemo has been making her feel an ache that is unique and that her bones are aching. She notes that her sentinel node was removed. Denies chest pain, edema, syncope or shortness of breath at rest, has no tachycardia, palpitations or sense of arrhythmia. EKG: Sinus  Rhythm HR 77  QTc 386 QRS 88  -Nonspecific ST depression  -Nondiagnostic. Assessment/Plan:     1. Encounter for monitoring cardiotoxic drug therapy  Previous echo shows a mild reduction in the GLS. Clinically she is asymptomatic. I explained to her we are comparing different echo machines in different facilities with different readers. I would like to repeat limited echo just for the strain and then follow protocol with herceptin. I think she can continue with chemotherapy in the interim with monitoring. I will get this limited echo and getrepeat echo in 3 months. - AMB POC EKG ROUTINE W/ 12 LEADS, INTER & REP  - ECHO ADULT FOLLOW-UP OR LIMITED; Future  - ECHO ADULT COMPLETE; Future    2.  Malignant neoplasm of overlapping sites of right breast in female, estrogen receptor positive ([address]  Has continued on chemo. It does make her fatigued but appears no evidence of CHF. Patient Instructions   You have been scheduled for a limited echo. We will call with results or send them via [employer] and see you back in 3 months with a full echocardiogram a few days prior. F/u in 3 months with echo prior      Impression:   1. Encounter for monitoring cardiotoxic drug therapy    2. Malignant neoplasm of overlapping sites of right breast in female, estrogen receptor positive [facility])       Cardiac History:   No specialty comments available. Medical Hx= breast cancer   , cervical and colon cancer   No prior cardiac cath, blood transfusion, or GI bleeding  Social Hx= ex smoker quit , 7 years of tobacco, no  alcohol drinks a week, 2 children,  retired  Family Hx= Mother passed at 80, Father  melanoma at 80 , Siblings-reviewed     Allergies   Allergen Reactions    Cipro [Ciprofloxacin Hcl] Nausea and Vomiting    Benadryl Allergy Decongestant Nausea and Vomiting    Ciprofloxacin Nausea and Vomiting      REVIEW OF SYMPTOMS: A  full 12 system ROS is reviewed with aid of new patient form ,  see scanned new patient worksheet.     + anxiety, +urinary frequency + leg pain   ROS-except as noted above. . A complete cardiac and respiratory are reviewed and negative except as above ; Resp-denies wheezing  or productive cough,. Const- No unusual weight loss or fever; Neuro-no recent seizure or CVA ; GI- No BRBPR, abdom pain, bloating ; - no  hematuria   see supplement sheet, initialed and to be scanned by staff  Past Medical History:   Diagnosis Date    Cancer ([facility] [address]     cervical and colon    Cervical cancer ([facility] [address] 1983    Colon cancer ([facility] [address]       Social Hx= reports that she has never smoked. She has never used smokeless tobacco. She reports that she does not drink alcohol and does not use drugs.      Exam and Labs:  /80   Pulse 87 Resp 16   Ht 5' 5\" (1.651 m)   Wt 151 lb (68.5 kg)   SpO2 97%   BMI 25.13 kg/m² Constitutional:  NAD, comfortable  Head: NC,AT. Eyes: No scleral icterus. Neck:  Neck supple. No JVD present. Throat: moist mucous membranes. Chest: Effort normal & normal respiratory excursion . Neurological: alert, conversant and oriented . Skin: Skin is not cold. No obvious systemic rash noted. Not diaphoretic. No erythema. Psychiatric:  Grossly normal mood and affect. Behavior appears normal. Extremities:  no clubbing or cyanosis. Abdomen: non distended    Lungs:breath sounds normal. No stridor. distress, wheezes or  Rales. Heart: normal rate, regular rhythm, normal S1, S2, no murmurs, rubs, clicks or gallops , PMI non displaced. Edema: Edema is none. No results found for: CHOL, CHOLX, CHLST, CHOLV, HDL, HDLP, LDL, LDLC, DLDLP, TGLX, TRIGL, TRIGP, CHHD, CHHDX  Lab Results   Component Value Date/Time    Sodium 142 08/25/2021 09:14 AM    Potassium 3.8 08/25/2021 09:14 AM    Chloride 110 (H) 08/25/2021 09:14 AM    CO2 28 08/25/2021 09:14 AM    Anion gap 4 (L) 08/25/2021 09:14 AM    Glucose 96 08/25/2021 09:14 AM    BUN 15 08/25/2021 09:14 AM    Creatinine 0.64 08/25/2021 09:14 AM    BUN/Creatinine ratio 23 (H) 08/25/2021 09:14 AM    GFR est AA >60 08/25/2021 09:14 AM    GFR est non-AA >60 08/25/2021 09:14 AM    Calcium 9.2 08/25/2021 09:14 AM      Wt Readings from Last 3 Encounters:   09/07/21 151 lb (68.5 kg)   09/01/21 150 lb 6.4 oz (68.2 kg)   08/25/21 150 lb 9.6 oz (68.3 kg)      BP Readings from Last 3 Encounters:   09/07/21 130/80   09/01/21 (!) 145/74   08/25/21 126/74      Current Outpatient Medications   Medication Sig    cholecalciferol (VITAMIN D3) (50,000 UNITS /1250 MCG) capsule TAKE 1 CAPSULE BY MOUTH EVERY SEVEN (7) DAYS.     sertraline (ZOLOFT) 50 mg tablet TAKE ONE TABLET BY MOUTH EVERY DAY    aluminum-magnesium hydroxide 200-200 mg/5 mL susp 5 mL, diphenhydrAMINE 12.5 mg/5 mL liqd 12.5 mg, lidocaine 2 % soln 5 mL 5 mL by Swish and Spit route four (4) times daily as needed for Pain. Magic mouth wash   Maalox  Lidocaine 2% viscous     Pharmacy to mix equal portions of ingredients to a total volume as indicated in the dispense amount. Please call in script without Benadryl per patient preference (Patient not taking: Reported on 9/7/2021)    sertraline (ZOLOFT) 50 mg tablet Take 50 mg by mouth daily.  ondansetron (ZOFRAN ODT) 4 mg disintegrating tablet Take 1-2 Tablets by mouth every eight (8) hours as needed for Nausea or Vomiting. (Patient not taking: Reported on 9/7/2021)    prochlorperazine (Compazine) 5 mg tablet Take 1 tab by mouth every 6 hours as needed for nausea or vomiting (Patient not taking: Reported on 9/7/2021)    lidocaine-prilocaine (EMLA) topical cream Apply  to affected area as needed for Pain. (Patient not taking: Reported on 9/7/2021)    omeprazole (PRILOSEC) 40 mg capsule Take 1 Cap by mouth daily.  aspirin (ASPIRIN) 325 mg tablet Take 325 mg by mouth daily. No current facility-administered medications for this visit. Impression see above.       Written by Oj Jackson, as dictated by Jovani Fraser MD.

## 2021-09-08 ENCOUNTER — OFFICE VISIT (OUTPATIENT)
Dept: ONCOLOGY | Age: 66
End: 2021-09-08
Payer: MEDICARE

## 2021-09-08 ENCOUNTER — HOSPITAL ENCOUNTER (OUTPATIENT)
Dept: INFUSION THERAPY | Age: 66
Discharge: HOME | End: 2021-09-08
Payer: MEDICARE

## 2021-09-08 ENCOUNTER — DOCUMENTATION ONLY (OUTPATIENT)
Dept: ONCOLOGY | Age: 66
End: 2021-09-08

## 2021-09-08 VITALS
HEIGHT: 65 IN | BODY MASS INDEX: 25.19 KG/M2 | OXYGEN SATURATION: 95 % | WEIGHT: 151.2 LBS | DIASTOLIC BLOOD PRESSURE: 56 MMHG | HEART RATE: 73 BPM | TEMPERATURE: 96.8 F | SYSTOLIC BLOOD PRESSURE: 122 MMHG

## 2021-09-08 VITALS
SYSTOLIC BLOOD PRESSURE: 122 MMHG | TEMPERATURE: 96.8 F | DIASTOLIC BLOOD PRESSURE: 56 MMHG | HEART RATE: 69 BPM | RESPIRATION RATE: 18 BRPM

## 2021-09-08 DIAGNOSIS — Z17.0 MALIGNANT NEOPLASM OF OVERLAPPING SITES OF RIGHT BREAST IN FEMALE, ESTROGEN RECEPTOR POSITIVE (HCC): Primary | ICD-10-CM

## 2021-09-08 DIAGNOSIS — Z85.41 HISTORY OF CERVICAL CANCER: ICD-10-CM

## 2021-09-08 DIAGNOSIS — C50.811 MALIGNANT NEOPLASM OF OVERLAPPING SITES OF RIGHT BREAST IN FEMALE, ESTROGEN RECEPTOR POSITIVE (HCC): Primary | ICD-10-CM

## 2021-09-08 DIAGNOSIS — Z09 CHEMOTHERAPY FOLLOW-UP EXAMINATION: ICD-10-CM

## 2021-09-08 DIAGNOSIS — Z95.828 PORT-A-CATH IN PLACE: ICD-10-CM

## 2021-09-08 DIAGNOSIS — R53.83 CHEMOTHERAPY-INDUCED FATIGUE: ICD-10-CM

## 2021-09-08 DIAGNOSIS — T45.1X5A CHEMOTHERAPY-INDUCED NAUSEA: ICD-10-CM

## 2021-09-08 DIAGNOSIS — Z79.899 ENCOUNTER FOR MONITORING CARDIOTOXIC DRUG THERAPY: ICD-10-CM

## 2021-09-08 DIAGNOSIS — R11.0 CHEMOTHERAPY-INDUCED NAUSEA: ICD-10-CM

## 2021-09-08 DIAGNOSIS — Z85.038 HISTORY OF COLON CANCER: ICD-10-CM

## 2021-09-08 DIAGNOSIS — Z51.81 ENCOUNTER FOR MONITORING CARDIOTOXIC DRUG THERAPY: ICD-10-CM

## 2021-09-08 DIAGNOSIS — T45.1X5A CHEMOTHERAPY-INDUCED FATIGUE: ICD-10-CM

## 2021-09-08 LAB
BASOPHILS # BLD: 0.1 K/UL (ref 0–0.1)
BASOPHILS NFR BLD: 1 % (ref 0–1)
DIFFERENTIAL METHOD BLD: ABNORMAL
EOSINOPHIL # BLD: 0.2 K/UL (ref 0–0.4)
EOSINOPHIL NFR BLD: 3 % (ref 0–7)
ERYTHROCYTE [DISTWIDTH] IN BLOOD BY AUTOMATED COUNT: 14.3 % (ref 11.5–14.5)
HCT VFR BLD AUTO: 37.5 % (ref 35–47)
HGB BLD-MCNC: 12.2 G/DL (ref 11.5–16)
IMM GRANULOCYTES # BLD AUTO: 0.1 K/UL (ref 0–0.04)
IMM GRANULOCYTES NFR BLD AUTO: 1 % (ref 0–0.5)
LYMPHOCYTES # BLD: 1.2 K/UL (ref 0.8–3.5)
LYMPHOCYTES NFR BLD: 24 % (ref 12–49)
MCH RBC QN AUTO: 31.7 PG (ref 26–34)
MCHC RBC AUTO-ENTMCNC: 32.5 G/DL (ref 30–36.5)
MCV RBC AUTO: 97.4 FL (ref 80–99)
MONOCYTES # BLD: 0.4 K/UL (ref 0–1)
MONOCYTES NFR BLD: 9 % (ref 5–13)
NEUTS SEG # BLD: 3.2 K/UL (ref 1.8–8)
NEUTS SEG NFR BLD: 62 % (ref 32–75)
NRBC # BLD: 0 K/UL (ref 0–0.01)
NRBC BLD-RTO: 0 PER 100 WBC
PLATELET # BLD AUTO: 219 K/UL (ref 150–400)
PMV BLD AUTO: 11.3 FL (ref 8.9–12.9)
RBC # BLD AUTO: 3.85 M/UL (ref 3.8–5.2)
WBC # BLD AUTO: 5.1 K/UL (ref 3.6–11)

## 2021-09-08 PROCEDURE — 96413 CHEMO IV INFUSION 1 HR: CPT

## 2021-09-08 PROCEDURE — 74011250636 HC RX REV CODE- 250/636: Performed by: INTERNAL MEDICINE

## 2021-09-08 PROCEDURE — G9899 SCRN MAM PERF RSLTS DOC: HCPCS | Performed by: INTERNAL MEDICINE

## 2021-09-08 PROCEDURE — 77030012965 HC NDL HUBR BBMI -A

## 2021-09-08 PROCEDURE — G8536 NO DOC ELDER MAL SCRN: HCPCS | Performed by: INTERNAL MEDICINE

## 2021-09-08 PROCEDURE — 96375 TX/PRO/DX INJ NEW DRUG ADDON: CPT

## 2021-09-08 PROCEDURE — 74011000250 HC RX REV CODE- 250: Performed by: INTERNAL MEDICINE

## 2021-09-08 PROCEDURE — G8400 PT W/DXA NO RESULTS DOC: HCPCS | Performed by: INTERNAL MEDICINE

## 2021-09-08 PROCEDURE — G9711 PT HX TOT COL OR COLON CA: HCPCS | Performed by: INTERNAL MEDICINE

## 2021-09-08 PROCEDURE — G8427 DOCREV CUR MEDS BY ELIG CLIN: HCPCS | Performed by: INTERNAL MEDICINE

## 2021-09-08 PROCEDURE — G8419 CALC BMI OUT NRM PARAM NOF/U: HCPCS | Performed by: INTERNAL MEDICINE

## 2021-09-08 PROCEDURE — G8510 SCR DEP NEG, NO PLAN REQD: HCPCS | Performed by: INTERNAL MEDICINE

## 2021-09-08 PROCEDURE — 1101F PT FALLS ASSESS-DOCD LE1/YR: CPT | Performed by: INTERNAL MEDICINE

## 2021-09-08 PROCEDURE — 99215 OFFICE O/P EST HI 40 MIN: CPT | Performed by: INTERNAL MEDICINE

## 2021-09-08 PROCEDURE — 74011250637 HC RX REV CODE- 250/637: Performed by: INTERNAL MEDICINE

## 2021-09-08 PROCEDURE — 36415 COLL VENOUS BLD VENIPUNCTURE: CPT

## 2021-09-08 PROCEDURE — G0463 HOSPITAL OUTPT CLINIC VISIT: HCPCS | Performed by: NURSE PRACTITIONER

## 2021-09-08 PROCEDURE — 85025 COMPLETE CBC W/AUTO DIFF WBC: CPT

## 2021-09-08 PROCEDURE — 1090F PRES/ABSN URINE INCON ASSESS: CPT | Performed by: INTERNAL MEDICINE

## 2021-09-08 RX ORDER — ACETAMINOPHEN 325 MG/1
650 TABLET ORAL
Status: CANCELLED | OUTPATIENT
Start: 2021-12-08

## 2021-09-08 RX ORDER — SODIUM CHLORIDE 9 MG/ML
25 INJECTION, SOLUTION INTRAVENOUS CONTINUOUS
Status: DISPENSED | OUTPATIENT
Start: 2021-09-08 | End: 2021-09-08

## 2021-09-08 RX ORDER — SODIUM CHLORIDE 0.9 % (FLUSH) 0.9 %
10 SYRINGE (ML) INJECTION AS NEEDED
Status: CANCELLED | OUTPATIENT
Start: 2021-11-17

## 2021-09-08 RX ORDER — HYDROCORTISONE SODIUM SUCCINATE 100 MG/2ML
100 INJECTION, POWDER, FOR SOLUTION INTRAMUSCULAR; INTRAVENOUS AS NEEDED
Status: CANCELLED | OUTPATIENT
Start: 2021-09-15

## 2021-09-08 RX ORDER — ALBUTEROL SULFATE 0.83 MG/ML
2.5 SOLUTION RESPIRATORY (INHALATION) AS NEEDED
Status: CANCELLED
Start: 2021-12-08

## 2021-09-08 RX ORDER — ALBUTEROL SULFATE 0.83 MG/ML
2.5 SOLUTION RESPIRATORY (INHALATION) AS NEEDED
Status: CANCELLED
Start: 2021-11-17

## 2021-09-08 RX ORDER — SODIUM CHLORIDE 0.9 % (FLUSH) 0.9 %
10 SYRINGE (ML) INJECTION AS NEEDED
Status: CANCELLED | OUTPATIENT
Start: 2021-10-06

## 2021-09-08 RX ORDER — SODIUM CHLORIDE 9 MG/ML
10 INJECTION INTRAMUSCULAR; INTRAVENOUS; SUBCUTANEOUS AS NEEDED
Status: CANCELLED | OUTPATIENT
Start: 2021-12-08

## 2021-09-08 RX ORDER — DIPHENHYDRAMINE HYDROCHLORIDE 50 MG/ML
50 INJECTION, SOLUTION INTRAMUSCULAR; INTRAVENOUS
Status: CANCELLED | OUTPATIENT
Start: 2021-11-17

## 2021-09-08 RX ORDER — HEPARIN 100 UNIT/ML
300-500 SYRINGE INTRAVENOUS AS NEEDED
Status: ACTIVE | OUTPATIENT
Start: 2021-09-08 | End: 2021-09-08

## 2021-09-08 RX ORDER — HYDROCORTISONE SODIUM SUCCINATE 100 MG/2ML
100 INJECTION, POWDER, FOR SOLUTION INTRAMUSCULAR; INTRAVENOUS AS NEEDED
Status: CANCELLED | OUTPATIENT
Start: 2021-10-06

## 2021-09-08 RX ORDER — HYDROCORTISONE SODIUM SUCCINATE 100 MG/2ML
100 INJECTION, POWDER, FOR SOLUTION INTRAMUSCULAR; INTRAVENOUS AS NEEDED
Status: CANCELLED | OUTPATIENT
Start: 2021-11-17

## 2021-09-08 RX ORDER — DIPHENHYDRAMINE HYDROCHLORIDE 50 MG/ML
50 INJECTION, SOLUTION INTRAMUSCULAR; INTRAVENOUS
Status: CANCELLED | OUTPATIENT
Start: 2021-09-15

## 2021-09-08 RX ORDER — EPINEPHRINE 1 MG/ML
0.3 INJECTION, SOLUTION, CONCENTRATE INTRAVENOUS AS NEEDED
Status: CANCELLED | OUTPATIENT
Start: 2021-10-06

## 2021-09-08 RX ORDER — HEPARIN 100 UNIT/ML
300-500 SYRINGE INTRAVENOUS AS NEEDED
Status: CANCELLED
Start: 2021-10-06

## 2021-09-08 RX ORDER — DIPHENHYDRAMINE HYDROCHLORIDE 50 MG/ML
50 INJECTION, SOLUTION INTRAMUSCULAR; INTRAVENOUS AS NEEDED
Status: CANCELLED
Start: 2021-09-15

## 2021-09-08 RX ORDER — ACETAMINOPHEN 325 MG/1
650 TABLET ORAL AS NEEDED
Status: CANCELLED
Start: 2021-12-08

## 2021-09-08 RX ORDER — ACETAMINOPHEN 325 MG/1
650 TABLET ORAL AS NEEDED
Status: CANCELLED
Start: 2021-09-15

## 2021-09-08 RX ORDER — SODIUM CHLORIDE 9 MG/ML
10 INJECTION INTRAMUSCULAR; INTRAVENOUS; SUBCUTANEOUS AS NEEDED
Status: ACTIVE | OUTPATIENT
Start: 2021-09-08 | End: 2021-09-08

## 2021-09-08 RX ORDER — SODIUM CHLORIDE 9 MG/ML
25 INJECTION, SOLUTION INTRAVENOUS CONTINUOUS
Status: CANCELLED | OUTPATIENT
Start: 2021-12-08

## 2021-09-08 RX ORDER — SODIUM CHLORIDE 0.9 % (FLUSH) 0.9 %
10 SYRINGE (ML) INJECTION AS NEEDED
Status: CANCELLED | OUTPATIENT
Start: 2021-12-08

## 2021-09-08 RX ORDER — ONDANSETRON 2 MG/ML
8 INJECTION INTRAMUSCULAR; INTRAVENOUS AS NEEDED
Status: CANCELLED | OUTPATIENT
Start: 2021-09-15

## 2021-09-08 RX ORDER — DIPHENHYDRAMINE HYDROCHLORIDE 50 MG/ML
25 INJECTION, SOLUTION INTRAMUSCULAR; INTRAVENOUS AS NEEDED
Status: CANCELLED
Start: 2021-12-08

## 2021-09-08 RX ORDER — DIPHENHYDRAMINE HYDROCHLORIDE 50 MG/ML
25 INJECTION, SOLUTION INTRAMUSCULAR; INTRAVENOUS AS NEEDED
Status: CANCELLED
Start: 2021-09-15

## 2021-09-08 RX ORDER — EPINEPHRINE 1 MG/ML
0.3 INJECTION, SOLUTION, CONCENTRATE INTRAVENOUS AS NEEDED
Status: CANCELLED | OUTPATIENT
Start: 2021-09-15

## 2021-09-08 RX ORDER — HEPARIN 100 UNIT/ML
300-500 SYRINGE INTRAVENOUS AS NEEDED
Status: CANCELLED
Start: 2021-12-08

## 2021-09-08 RX ORDER — SODIUM CHLORIDE 9 MG/ML
10 INJECTION INTRAMUSCULAR; INTRAVENOUS; SUBCUTANEOUS AS NEEDED
Status: CANCELLED | OUTPATIENT
Start: 2021-11-17

## 2021-09-08 RX ORDER — EPINEPHRINE 1 MG/ML
0.3 INJECTION, SOLUTION, CONCENTRATE INTRAVENOUS AS NEEDED
Status: CANCELLED | OUTPATIENT
Start: 2021-12-08

## 2021-09-08 RX ORDER — DIPHENHYDRAMINE HYDROCHLORIDE 50 MG/ML
50 INJECTION, SOLUTION INTRAMUSCULAR; INTRAVENOUS AS NEEDED
Status: CANCELLED
Start: 2021-12-08

## 2021-09-08 RX ORDER — ACETAMINOPHEN 325 MG/1
650 TABLET ORAL AS NEEDED
Status: CANCELLED
Start: 2021-10-06

## 2021-09-08 RX ORDER — ONDANSETRON 2 MG/ML
8 INJECTION INTRAMUSCULAR; INTRAVENOUS ONCE
Status: COMPLETED | OUTPATIENT
Start: 2021-09-08 | End: 2021-09-08

## 2021-09-08 RX ORDER — ACETAMINOPHEN 325 MG/1
650 TABLET ORAL
Status: CANCELLED | OUTPATIENT
Start: 2021-09-15

## 2021-09-08 RX ORDER — DIPHENHYDRAMINE HYDROCHLORIDE 50 MG/ML
25 INJECTION, SOLUTION INTRAMUSCULAR; INTRAVENOUS AS NEEDED
Status: CANCELLED
Start: 2021-10-06

## 2021-09-08 RX ORDER — DIPHENHYDRAMINE HYDROCHLORIDE 50 MG/ML
50 INJECTION, SOLUTION INTRAMUSCULAR; INTRAVENOUS
Status: CANCELLED | OUTPATIENT
Start: 2021-12-08

## 2021-09-08 RX ORDER — CETIRIZINE HCL 10 MG
10 TABLET ORAL ONCE
Status: COMPLETED | OUTPATIENT
Start: 2021-09-08 | End: 2021-09-08

## 2021-09-08 RX ORDER — DEXAMETHASONE SODIUM PHOSPHATE 10 MG/ML
10 INJECTION INTRAMUSCULAR; INTRAVENOUS ONCE
Status: COMPLETED | OUTPATIENT
Start: 2021-09-08 | End: 2021-09-08

## 2021-09-08 RX ORDER — SODIUM CHLORIDE 9 MG/ML
10 INJECTION INTRAMUSCULAR; INTRAVENOUS; SUBCUTANEOUS AS NEEDED
Status: CANCELLED | OUTPATIENT
Start: 2021-09-15

## 2021-09-08 RX ORDER — ACETAMINOPHEN 325 MG/1
650 TABLET ORAL
Status: CANCELLED | OUTPATIENT
Start: 2021-11-17

## 2021-09-08 RX ORDER — SODIUM CHLORIDE 9 MG/ML
10 INJECTION INTRAMUSCULAR; INTRAVENOUS; SUBCUTANEOUS AS NEEDED
Status: CANCELLED | OUTPATIENT
Start: 2021-10-06

## 2021-09-08 RX ORDER — DIPHENHYDRAMINE HYDROCHLORIDE 50 MG/ML
50 INJECTION, SOLUTION INTRAMUSCULAR; INTRAVENOUS AS NEEDED
Status: CANCELLED
Start: 2021-10-06

## 2021-09-08 RX ORDER — EPINEPHRINE 1 MG/ML
0.3 INJECTION, SOLUTION, CONCENTRATE INTRAVENOUS AS NEEDED
Status: CANCELLED | OUTPATIENT
Start: 2021-11-17

## 2021-09-08 RX ORDER — ALBUTEROL SULFATE 0.83 MG/ML
2.5 SOLUTION RESPIRATORY (INHALATION) AS NEEDED
Status: CANCELLED
Start: 2021-10-06

## 2021-09-08 RX ORDER — ACETAMINOPHEN 325 MG/1
650 TABLET ORAL AS NEEDED
Status: CANCELLED
Start: 2021-11-17

## 2021-09-08 RX ORDER — DIPHENHYDRAMINE HYDROCHLORIDE 50 MG/ML
25 INJECTION, SOLUTION INTRAMUSCULAR; INTRAVENOUS AS NEEDED
Status: CANCELLED
Start: 2021-11-17

## 2021-09-08 RX ORDER — SODIUM CHLORIDE 9 MG/ML
25 INJECTION, SOLUTION INTRAVENOUS CONTINUOUS
Status: CANCELLED | OUTPATIENT
Start: 2021-09-15

## 2021-09-08 RX ORDER — DIPHENHYDRAMINE HYDROCHLORIDE 50 MG/ML
50 INJECTION, SOLUTION INTRAMUSCULAR; INTRAVENOUS AS NEEDED
Status: CANCELLED
Start: 2021-11-17

## 2021-09-08 RX ORDER — HEPARIN 100 UNIT/ML
300-500 SYRINGE INTRAVENOUS AS NEEDED
Status: CANCELLED
Start: 2021-11-17

## 2021-09-08 RX ORDER — SODIUM CHLORIDE 0.9 % (FLUSH) 0.9 %
10 SYRINGE (ML) INJECTION AS NEEDED
Status: CANCELLED | OUTPATIENT
Start: 2021-09-15

## 2021-09-08 RX ORDER — ONDANSETRON 2 MG/ML
8 INJECTION INTRAMUSCULAR; INTRAVENOUS AS NEEDED
Status: CANCELLED | OUTPATIENT
Start: 2021-10-06

## 2021-09-08 RX ORDER — SODIUM CHLORIDE 9 MG/ML
25 INJECTION, SOLUTION INTRAVENOUS CONTINUOUS
Status: CANCELLED | OUTPATIENT
Start: 2021-10-06

## 2021-09-08 RX ORDER — ACETAMINOPHEN 325 MG/1
650 TABLET ORAL
Status: CANCELLED | OUTPATIENT
Start: 2021-10-06

## 2021-09-08 RX ORDER — HEPARIN 100 UNIT/ML
300-500 SYRINGE INTRAVENOUS AS NEEDED
Status: CANCELLED
Start: 2021-09-15

## 2021-09-08 RX ORDER — ONDANSETRON 2 MG/ML
8 INJECTION INTRAMUSCULAR; INTRAVENOUS AS NEEDED
Status: CANCELLED | OUTPATIENT
Start: 2021-12-08

## 2021-09-08 RX ORDER — DIPHENHYDRAMINE HYDROCHLORIDE 50 MG/ML
50 INJECTION, SOLUTION INTRAMUSCULAR; INTRAVENOUS
Status: CANCELLED | OUTPATIENT
Start: 2021-10-06

## 2021-09-08 RX ORDER — SODIUM CHLORIDE 0.9 % (FLUSH) 0.9 %
10 SYRINGE (ML) INJECTION AS NEEDED
Status: DISPENSED | OUTPATIENT
Start: 2021-09-08 | End: 2021-09-08

## 2021-09-08 RX ORDER — ALBUTEROL SULFATE 0.83 MG/ML
2.5 SOLUTION RESPIRATORY (INHALATION) AS NEEDED
Status: CANCELLED
Start: 2021-09-15

## 2021-09-08 RX ORDER — ONDANSETRON 2 MG/ML
8 INJECTION INTRAMUSCULAR; INTRAVENOUS AS NEEDED
Status: CANCELLED | OUTPATIENT
Start: 2021-11-17

## 2021-09-08 RX ORDER — HYDROCORTISONE SODIUM SUCCINATE 100 MG/2ML
100 INJECTION, POWDER, FOR SOLUTION INTRAMUSCULAR; INTRAVENOUS AS NEEDED
Status: CANCELLED | OUTPATIENT
Start: 2021-12-08

## 2021-09-08 RX ORDER — SODIUM CHLORIDE 9 MG/ML
25 INJECTION, SOLUTION INTRAVENOUS CONTINUOUS
Status: CANCELLED | OUTPATIENT
Start: 2021-11-17

## 2021-09-08 RX ADMIN — PACLITAXEL 107 MG: 6 INJECTION, SOLUTION, CONCENTRATE INTRAVENOUS at 11:52

## 2021-09-08 RX ADMIN — CETIRIZINE HYDROCHLORIDE 10 MG: 10 TABLET, FILM COATED ORAL at 11:00

## 2021-09-08 RX ADMIN — FAMOTIDINE 20 MG: 10 INJECTION INTRAVENOUS at 10:49

## 2021-09-08 RX ADMIN — SODIUM CHLORIDE 10 ML: 9 INJECTION INTRAMUSCULAR; INTRAVENOUS; SUBCUTANEOUS at 10:47

## 2021-09-08 RX ADMIN — DEXAMETHASONE SODIUM PHOSPHATE 10 MG: 10 INJECTION, SOLUTION INTRAMUSCULAR; INTRAVENOUS at 10:54

## 2021-09-08 RX ADMIN — HEPARIN 500 UNITS: 100 SYRINGE at 12:58

## 2021-09-08 RX ADMIN — SODIUM CHLORIDE 25 ML/HR: 900 INJECTION, SOLUTION INTRAVENOUS at 10:47

## 2021-09-08 RX ADMIN — SODIUM CHLORIDE 10 ML: 9 INJECTION INTRAMUSCULAR; INTRAVENOUS; SUBCUTANEOUS at 12:58

## 2021-09-08 RX ADMIN — ONDANSETRON 8 MG: 2 INJECTION INTRAMUSCULAR; INTRAVENOUS at 10:52

## 2021-09-08 NOTE — PROGRESS NOTES
[facility] [facility] [place] [address] Stephanie [place], [address], [facility]: [phone]  F: [phone]    Reason for Visit:   Rosaline Kirkland is a 77 y.o. female who is seen today in office for follow up of Right Breast Cancer on adjuvant weekly Taxol/Herceptin. Treatment History:   · Breast biopsy  · Lumpectomy 4/21/21 at [facility]  · Adjuvant weekly Taxol/Herceptin x 12 weeks from 6/16/21 - 9/8/21  · Taxol DR to 60 mg/m2 on Day 8 Cycle 3 due to side effects  · Maintenance Herceptin to start 9/15/21 -    STAGE:  · T2N0 ER+ HER2 +  · MYRISK negative    History of Present Illness: Rosaline Kirkland is a 77 y.o. female seen today in office for follow up of right breast cancer ER+ HER2+ post lumpectomy on weekly Taxol/Herceptin since 6/16/21. She is here today for Day 15 Cycle 4 (Week 12). Taxol was DR to 60 mg/m2 with Day 8 Cycle 3 due to side effects. She reports that she feels well overall today. She is tolerating treatment well overall with some fatigue. Her appetite is good and energy levels are lower overall. She has mild tingling but very little and very mild, occasional nausea. She denies fever, chills, mouth sores, cough, SOB, CP, vomiting, diarrhea, constipation. CBC and CMP are still pending today. She is ready for treatment today, is using the Cold Cap during treatment. She is here alone today.     Past Medical History:   Diagnosis Date    Cancer [facility])     cervical and colon    Cervical cancer ([facility] [address] 1983    Colon cancer ([facility] [address] 2008      Past Surgical History:   Procedure Laterality Date    ENDOSCOPY, COLON, DIAGNOSTIC        Social History     Tobacco Use    Smoking status: Never Smoker    Smokeless tobacco: Never Used   Substance Use Topics    Alcohol use: Never      Family History   Problem Relation Age of Onset    Cancer Mother     Breast Cancer Mother     Cancer Father     Melanoma Father     Breast Cancer Sister     Colon Cancer Brother     Prostate Cancer Brother     Heart Disease Mother     Hypertension Sister      Current Outpatient Medications   Medication Sig    cholecalciferol (VITAMIN D3) (50,000 UNITS /1250 MCG) capsule TAKE 1 CAPSULE BY MOUTH EVERY SEVEN (7) DAYS.  sertraline (ZOLOFT) 50 mg tablet Take 50 mg by mouth daily.  omeprazole (PRILOSEC) 40 mg capsule Take 1 Cap by mouth daily.  sertraline (ZOLOFT) 50 mg tablet TAKE ONE TABLET BY MOUTH EVERY DAY    aspirin (ASPIRIN) 325 mg tablet Take 325 mg by mouth daily.  aluminum-magnesium hydroxide 200-200 mg/5 mL susp 5 mL, diphenhydrAMINE 12.5 mg/5 mL liqd 12.5 mg, lidocaine 2 % soln 5 mL 5 mL by Swish and Spit route four (4) times daily as needed for Pain. Magic mouth wash   Maalox  Lidocaine 2% viscous     Pharmacy to mix equal portions of ingredients to a total volume as indicated in the dispense amount. Please call in script without Benadryl per patient preference (Patient not taking: Reported on 9/7/2021)    ondansetron (ZOFRAN ODT) 4 mg disintegrating tablet Take 1-2 Tablets by mouth every eight (8) hours as needed for Nausea or Vomiting. (Patient not taking: Reported on 9/7/2021)    prochlorperazine (Compazine) 5 mg tablet Take 1 tab by mouth every 6 hours as needed for nausea or vomiting (Patient not taking: Reported on 9/7/2021)    lidocaine-prilocaine (EMLA) topical cream Apply  to affected area as needed for Pain. (Patient not taking: Reported on 9/7/2021)     No current facility-administered medications for this visit. Allergies   Allergen Reactions    Cipro [Ciprofloxacin Hcl] Nausea and Vomiting    Benadryl Allergy Decongestant Nausea and Vomiting    Ciprofloxacin Nausea and Vomiting      Review of Systems:  A complete review of systems was obtained, negative except as described above and as reported on ROS sheet scanned into system.      Physical Exam:     Visit Vitals  BP (!) 122/56 (BP 1 Location: Left upper arm, BP Patient Position: Sitting)   Pulse 73   Temp 96.8 °F (36 °C) (Temporal)   Ht 5' 5\" (1.651 m)   Wt 151 lb 3.2 oz (68.6 kg)   SpO2 95%   BMI 25.16 kg/m²     ECOG PS: 0  General: No distress  Eyes: Anicteric sclerae  HENT: Atraumatic, wearing a mask  Neck: Supple  Respiratory: CTAB, normal respiratory effort  CV: Normal rate, regular rhythm, no murmurs, no peripheral edema  GI: Soft, nontender, nondistended, no masses  MS: Normal gait and station. Digits without clubbing or cyanosis. Mild swelling in right arm with cording   Skin: No rashes, ecchymoses, or petechiae. Normal temperature, turgor, and texture. Port site without redness/swelling  Psych: Alert, oriented, appropriate affect, normal judgment/insight  Neuro: Non focal    Results:     Lab Results   Component Value Date/Time    WBC 5.1 09/08/2021 09:05 AM    HGB 12.2 09/08/2021 09:05 AM    HCT 37.5 09/08/2021 09:05 AM    PLATELET 816 25/30/3403 09:05 AM    MCV 97.4 09/08/2021 09:05 AM    ABS. NEUTROPHILS 3.2 09/08/2021 09:05 AM    HGB (POC) 14.3 09/20/2016 09:28 AM    HCT (POC) 43.1 09/20/2016 09:28 AM     Lab Results   Component Value Date/Time    Sodium 142 08/25/2021 09:14 AM    Potassium 3.8 08/25/2021 09:14 AM    Chloride 110 (H) 08/25/2021 09:14 AM    CO2 28 08/25/2021 09:14 AM    Glucose 96 08/25/2021 09:14 AM    BUN 15 08/25/2021 09:14 AM    Creatinine 0.64 08/25/2021 09:14 AM    GFR est AA >60 08/25/2021 09:14 AM    GFR est non-AA >60 08/25/2021 09:14 AM    Calcium 9.2 08/25/2021 09:14 AM     Lab Results   Component Value Date/Time    Bilirubin, total 0.4 08/25/2021 09:14 AM    ALT (SGPT) 25 08/25/2021 09:14 AM    Alk. phosphatase 101 08/25/2021 09:14 AM    Protein, total 7.1 08/25/2021 09:14 AM    Albumin 3.6 08/25/2021 09:14 AM    Globulin 3.5 08/25/2021 09:14 AM     All reports from outside facility scanned into media    08/23/21    ECHO ADULT COMPLETE 08/23/2021 8/23/2021    Interpretation Summary  · LV: Estimated LVEF is 55 - 60%.  Normal cavity size, wall thickness and systolic function (ejection fraction normal). Normal left ventricular strain. Global longitudinal strain is -15.4%. Mild (grade 1) left ventricular diastolic dysfunction. Signed by: Germain Jarvis MD on 8/23/2021  1:06 PM    Records reviewed and summarized above. Pathology report(s) reviewed above. Radiology report(s) reviewed above. Assessment:/PLAN     1) Stage 2 RIGHT Breast Cancer ER+ Her2+ post Lumpectomy 4/21/21   previously seen by Dr. Lyn Gibbs with [facility]. Most interested in weekly Taxol/Herceptin. She had a pre chemo ECHO on 5/20/21 at [facility] that was good with EF 60-65%- scanned into media. She already has a port. She started weekly Taxol/Herceptin on 6/16/21. She is here today for Day 15 Cycle 4 (Week 12) of weekly Taxol/Herceptin. Taxol was DR to 60 mg/m2 with Day 8 Cycle 3 due to side effects. She had a follow up ECHO on 8/23/21 that was stable with EF 55-60%. She saw Cardiology yesterday and had EKG and will have another ECHO later this week. She is tolerating chemo well overall - side effects are better. She has very mild, occasional nausea and fatigue. She is clinically stable today and doing well overall. Patient can see Breast Surgeon to have port removed. Referred to Rad/Onc today. Labs (CBC) reviewed today. Patient is ready for treatment today. She is doing cold cap with chemo. Follow up in 1 week in [facility] for maintenance Herceptin. Follow up in 4 weeks in [facility]/office. Patient agrees with plan. 2) Hx of Colon Cancer (2008) and Cervical Cancer (7265)  She did not have chemo. On a course of surveillance. 3) Chemo Induced Nausea/Fatigue  Very mild, Grade 1. Controlled with anti-emetics PRN. Will continue to monitor. 4) Management of High Risk Medications - Chemotherapy  Toxicities include Grade 1 Nausea and Grade 1 Fatigue. Taxol DR to 60 mg/m2 with Day 8 Cycle 3. Referred to Acupuncture for arthralgia. Labs (CBC) reviewed today. Will monitor for side effects. 5) Psychosocial  Mood good. Coping well. She has good family support. SW/NN support as needed. Has supportive . Call if questions. Follow up in 1 week in [facility] and 4 weeks in [facility]office. This patient was seen in conjunction with Narda Guerrero NP. I personally performed a face to face diagnostic evaluation on this patient. I personally reviewed the history and performed the key points on the exam.   I personally reviewed all points in the assessment and created treatment plan with the patient. Specifically, pt seen by me today  Reviewed taxol/ herceptin treatment plan overall. Pt may want to do one more chemo to total 12 since she missed one. Seeing cardio for fu ECHO. Labs personally reviewed today. Pt is to see Rad/onc and referred today. For adjuvant hormonal therapy post AI. Clinically doing great    I appreciate the opportunity to participate in Ms. Mila Stacy's care.     Signed By: Fer Quick DO

## 2021-09-08 NOTE — PROGRESS NOTES
[employer]  Social Work Navigator Encounter     Patient Name:  [facility] \"Luz\" Tawanna     Medical History: Right Breast Cancer on adjuvant weekly Taxol/Herceptin.      Advance Directives: Patient does not have an advanced medical directive, and did not express interest in completing one today. Narrative:   Met with the patient during her office visit. Patient had her final chemotherapy treatment today. Provided active listening and emotional support today, as she shared that her older brother recently passed away, which came as a shock. She shared that they both had colon cancer at the same time, in 2008. He was the oldest child, and she is the youngest.  Emelle Couch[employer] were held virtually, which was understandably challenging as well. She shared that her family had a COVID scare recently, but she luckily did not test positive. Offered continued support to the patient as needed. Barriers to Care:   No barriers to care identified at this time. Plan:  1. Continue to meet with the patient when she returns to the clinic for ongoing assessment of the patients adjustment to her diagnosis and treatment. 2.  Ongoing psychosocial support as desired by patient. Referral:   No referrals placed at this time.    Hui Cardona LCSW

## 2021-09-08 NOTE — PROGRESS NOTES
Marcos Bejarano is a 77 y.o. female  Chief Complaint   Patient presents with    Chemotherapy    Breast Cancer   1. Have you been to the ER, urgent care clinic since your last visit? Hospitalized since your last visit? No.  2. Have you seen or consulted any other health care providers outside of the [address] [place] [address] since your last visit? Include any pap smears or colon screening.  No.

## 2021-09-08 NOTE — PROGRESS NOTES
[facility] Chemo Progress Note    Date: September 8, 2021      0855: Ms. Kaitlynn Joyce Arrived to [facility] for  C4D15 Taxol ambulatory in stable condition. Assessment was completed, no acute issues at this time, no new complaints voiced. Port accessed with positive blood return. Labs drawn and sent for processing. Went to provider appointment with Medical Oncology. Patient denies any symptoms of COVID-19, including SOB, coughing, fever, or generally not feeling well. Patient denies any recent exposure to COVID-19. Patient denies any recent contact with family or friends that have a pending COVID-19 test.    0935: Labs reviewed. Criteria for treatment was met. 1010: Returned from provider appointment. Chemotherapy Flowsheet 9/8/2021   Cycle C4D15   Date 9/8/2021   Drug / Regimen Taxol   Pre Meds Given   Notes Given       Ms. Stacy's vitals were reviewed. Patient Vitals for the past 12 hrs:   Temp Pulse Resp BP   09/08/21 0855 96.8 °F (36 °C) 69 18 (!) 122/56       Lab results were obtained and reviewed. Recent Results (from the past 12 hour(s))   CBC WITH AUTOMATED DIFF    Collection Time: 09/08/21  9:05 AM   Result Value Ref Range    WBC 5.1 3.6 - 11.0 K/uL    RBC 3.85 3.80 - 5.20 M/uL    HGB 12.2 11.5 - 16.0 g/dL    HCT 37.5 35.0 - 47.0 %    MCV 97.4 80.0 - 99.0 FL    MCH 31.7 26.0 - 34.0 PG    MCHC 32.5 30.0 - 36.5 g/dL    RDW 14.3 11.5 - 14.5 %    PLATELET 672 099 - 925 K/uL    MPV 11.3 8.9 - 12.9 FL    NRBC 0.0 0  WBC    ABSOLUTE NRBC 0.00 0.00 - 0.01 K/uL    NEUTROPHILS 62 32 - 75 %    LYMPHOCYTES 24 12 - 49 %    MONOCYTES 9 5 - 13 %    EOSINOPHILS 3 0 - 7 %    BASOPHILS 1 0 - 1 %    IMMATURE GRANULOCYTES 1 (H) 0.0 - 0.5 %    ABS. NEUTROPHILS 3.2 1.8 - 8.0 K/UL    ABS. LYMPHOCYTES 1.2 0.8 - 3.5 K/UL    ABS. MONOCYTES 0.4 0.0 - 1.0 K/UL    ABS. EOSINOPHILS 0.2 0.0 - 0.4 K/UL    ABS. BASOPHILS 0.1 0.0 - 0.1 K/UL    ABS. IMM.  GRANS. 0.1 (H) 0.00 - 0.04 K/UL    DF AUTOMATED           Pre-medications  were administered as ordered and chemotherapy was initiated. Medications Administered     0.9% sodium chloride infusion     Admin Date  09/08/2021 Action  New Bag Dose  25 mL/hr Rate  25 mL/hr Route  IntraVENous Administered By  Vianca Perera          0.9% sodium chloride injection 10 mL     Admin Date  09/08/2021 Action  Given Dose  10 mL Route  IntraVENous Administered By  Ollie Park Date  09/08/2021 Action  Given Dose  10 mL Route  IntraVENous Administered By  Vianca Perera          cetirizine (ZYRTEC) tablet 10 mg     Admin Date  09/08/2021 Action  Given Dose  10 mg Route  Oral Administered By  Vianca Perera          dexamethasone (PF) (DECADRON) 10 mg/mL injection 10 mg     Admin Date  09/08/2021 Action  Given Dose  10 mg Route  IntraVENous Administered By  Vianca Perera          famotidine (PF) (PEPCID) 20 mg in 0.9% sodium chloride 10 mL injection     Admin Date  09/08/2021 Action  Given Dose  20 mg Route  IntraVENous Administered By  Vianca Perera          heparin (porcine) pf 300-500 Units     Admin Date  09/08/2021 Action  Given Dose  500 Units Route  InterCATHeter Administered By  Vianca Perera          ondansetron [facility] injection 8 mg     Admin Date  09/08/2021 Action  Given Dose  8 mg Route  IntraVENous Administered By  Vianca Perera          PACLitaxeL (TAXOL) 107 mg in 0.9% sodium chloride 250 mL, overfill volume 25 mL chemo infusion     Admin Date  09/08/2021 Action  New Bag Dose  107 mg Rate  292.8 mL/hr Route  IntraVENous Administered By  Vianca Perera              1400: Patient tolerated treatment well. Port maintained positive blood return throughout treatment. Port flushed, heparinized and de accessed per protocol. Patient was discharged in stable condition. Patient is aware of next scheduled [facility] appointment on 9/29/21.     Future Appointments   Date Time Provider Joseph Workman   9/8/2021  9:15 AM Pete Barriga  N Sunil Garcia AMB 9/10/2021  9:30 AM JOHN SOL BS AMB   9/21/2021 10:00 AM MD ANNABELLE Davis BS AMB   9/29/2021  9:00 AM G2 MARTY [facility]   10/5/2021 10:00 AM MD ANNABELLE Davis BS AMB   10/20/2021  8:30 AM H1 MARTY [facility]   11/10/2021  8:30 AM G1 MARTY [facility]   12/3/2021  9:00 AM JOHN SOL BS AMB   12/6/2021  8:40 AM MD JAVID Andrews BS AMB         Andrea Cruz RN  September 8, 2021

## 2021-09-08 NOTE — LETTER
9/8/2021    Patient: Sofia Clay   YOB: 1955   Date of Visit: 9/8/2021     Cher Bliss MD  [address] [place] [address]    Dear Cher Bliss MD,      Thank you for referring Ms. Sofia Clay to [address] [place] [address] for evaluation. My notes for this consultation are attached. If you have questions, please do not hesitate to call me. I look forward to following your patient along with you.       Sincerely,    Jorge Cardoza NP

## 2021-09-10 ENCOUNTER — ANCILLARY PROCEDURE (OUTPATIENT)
Dept: CARDIOLOGY CLINIC | Age: 66
End: 2021-09-10
Payer: MEDICARE

## 2021-09-10 VITALS
SYSTOLIC BLOOD PRESSURE: 122 MMHG | BODY MASS INDEX: 25.16 KG/M2 | HEIGHT: 65 IN | WEIGHT: 151 LBS | DIASTOLIC BLOOD PRESSURE: 56 MMHG

## 2021-09-10 DIAGNOSIS — Z51.81 ENCOUNTER FOR MONITORING CARDIOTOXIC DRUG THERAPY: ICD-10-CM

## 2021-09-10 DIAGNOSIS — Z79.899 ENCOUNTER FOR MONITORING CARDIOTOXIC DRUG THERAPY: ICD-10-CM

## 2021-09-10 PROCEDURE — 93308 TTE F-UP OR LMTD: CPT | Performed by: SPECIALIST

## 2021-09-13 ENCOUNTER — PATIENT MESSAGE (OUTPATIENT)
Dept: CARDIOLOGY CLINIC | Age: 66
End: 2021-09-13

## 2021-09-13 DIAGNOSIS — Z51.81 ENCOUNTER FOR MONITORING CARDIOTOXIC DRUG THERAPY: Primary | ICD-10-CM

## 2021-09-13 DIAGNOSIS — Z79.899 ENCOUNTER FOR MONITORING CARDIOTOXIC DRUG THERAPY: Primary | ICD-10-CM

## 2021-09-13 LAB
ECHO LV EDV A2C: 86.82 ML
ECHO LV EDV A4C: 86.54 ML
ECHO LV EDV BP: 87.02 ML (ref 56–104)
ECHO LV EDV INDEX A4C: 49.2 ML/M2
ECHO LV EDV INDEX BP: 49.4 ML/M2
ECHO LV EDV NDEX A2C: 49.3 ML/M2
ECHO LV EJECTION FRACTION A2C: 56 PERCENT
ECHO LV EJECTION FRACTION A4C: 48 PERCENT
ECHO LV EJECTION FRACTION BIPLANE: 52 PERCENT (ref 55–100)
ECHO LV ESV A2C: 37.91 ML
ECHO LV ESV A4C: 45.11 ML
ECHO LV ESV BP: 41.75 ML (ref 19–49)
ECHO LV ESV INDEX A2C: 21.5 ML/M2
ECHO LV ESV INDEX A4C: 25.6 ML/M2
ECHO LV ESV INDEX BP: 23.7 ML/M2
ECHO LV GLOBAL LONGITUDINAL STRAIN (GLS): -16.3 PERCENT
GLOBAL LONGITUDINAL STRAIN 2 CHAMBER: -16.6 PERCENT
GLOBAL LONGITUDINAL STRAIN 4 CHAMBER: -15.5 PERCENT
GLOBAL LONGITUDINAL STRAIN LONG AXIS: -16.7 PERCENT

## 2021-09-15 ENCOUNTER — TELEPHONE (OUTPATIENT)
Dept: ONCOLOGY | Age: 66
End: 2021-09-15

## 2021-09-15 ENCOUNTER — HOSPITAL ENCOUNTER (OUTPATIENT)
Dept: INFUSION THERAPY | Age: 66
Discharge: HOME | End: 2021-09-15
Payer: MEDICARE

## 2021-09-15 VITALS
HEART RATE: 71 BPM | SYSTOLIC BLOOD PRESSURE: 130 MMHG | DIASTOLIC BLOOD PRESSURE: 55 MMHG | TEMPERATURE: 96.6 F | RESPIRATION RATE: 18 BRPM

## 2021-09-15 DIAGNOSIS — Z17.0 MALIGNANT NEOPLASM OF OVERLAPPING SITES OF RIGHT BREAST IN FEMALE, ESTROGEN RECEPTOR POSITIVE (HCC): Primary | ICD-10-CM

## 2021-09-15 DIAGNOSIS — C50.811 MALIGNANT NEOPLASM OF OVERLAPPING SITES OF RIGHT BREAST IN FEMALE, ESTROGEN RECEPTOR POSITIVE (HCC): Primary | ICD-10-CM

## 2021-09-15 LAB
ALBUMIN SERPL-MCNC: 3.5 G/DL (ref 3.5–5)
ALBUMIN/GLOB SERPL: 1.1 {RATIO} (ref 1.1–2.2)
ALP SERPL-CCNC: 85 U/L (ref 45–117)
ALT SERPL-CCNC: 23 U/L (ref 12–78)
ANION GAP SERPL CALC-SCNC: 6 MMOL/L (ref 5–15)
AST SERPL-CCNC: 16 U/L (ref 15–37)
BASOPHILS # BLD: 0.1 K/UL (ref 0–0.1)
BASOPHILS NFR BLD: 1 % (ref 0–1)
BILIRUB SERPL-MCNC: 0.4 MG/DL (ref 0.2–1)
BUN SERPL-MCNC: 15 MG/DL (ref 6–20)
BUN/CREAT SERPL: 19 (ref 12–20)
CALCIUM SERPL-MCNC: 8.9 MG/DL (ref 8.5–10.1)
CHLORIDE SERPL-SCNC: 112 MMOL/L (ref 97–108)
CO2 SERPL-SCNC: 25 MMOL/L (ref 21–32)
CREAT SERPL-MCNC: 0.8 MG/DL (ref 0.55–1.02)
DIFFERENTIAL METHOD BLD: ABNORMAL
EOSINOPHIL # BLD: 0.1 K/UL (ref 0–0.4)
EOSINOPHIL NFR BLD: 3 % (ref 0–7)
ERYTHROCYTE [DISTWIDTH] IN BLOOD BY AUTOMATED COUNT: 14.4 % (ref 11.5–14.5)
GLOBULIN SER CALC-MCNC: 3.1 G/DL (ref 2–4)
GLUCOSE SERPL-MCNC: 85 MG/DL (ref 65–100)
HCT VFR BLD AUTO: 37.2 % (ref 35–47)
HGB BLD-MCNC: 12.5 G/DL (ref 11.5–16)
IMM GRANULOCYTES # BLD AUTO: 0 K/UL (ref 0–0.04)
IMM GRANULOCYTES NFR BLD AUTO: 1 % (ref 0–0.5)
LYMPHOCYTES # BLD: 1.1 K/UL (ref 0.8–3.5)
LYMPHOCYTES NFR BLD: 27 % (ref 12–49)
MCH RBC QN AUTO: 32.6 PG (ref 26–34)
MCHC RBC AUTO-ENTMCNC: 33.6 G/DL (ref 30–36.5)
MCV RBC AUTO: 96.9 FL (ref 80–99)
MONOCYTES # BLD: 0.3 K/UL (ref 0–1)
MONOCYTES NFR BLD: 9 % (ref 5–13)
NEUTS SEG # BLD: 2.4 K/UL (ref 1.8–8)
NEUTS SEG NFR BLD: 59 % (ref 32–75)
NRBC # BLD: 0 K/UL (ref 0–0.01)
NRBC BLD-RTO: 0 PER 100 WBC
PLATELET # BLD AUTO: 241 K/UL (ref 150–400)
PMV BLD AUTO: 11.1 FL (ref 8.9–12.9)
POTASSIUM SERPL-SCNC: 4 MMOL/L (ref 3.5–5.1)
PROT SERPL-MCNC: 6.6 G/DL (ref 6.4–8.2)
RBC # BLD AUTO: 3.84 M/UL (ref 3.8–5.2)
SODIUM SERPL-SCNC: 143 MMOL/L (ref 136–145)
WBC # BLD AUTO: 4 K/UL (ref 3.6–11)

## 2021-09-15 PROCEDURE — 74011250636 HC RX REV CODE- 250/636: Performed by: NURSE PRACTITIONER

## 2021-09-15 PROCEDURE — 36415 COLL VENOUS BLD VENIPUNCTURE: CPT

## 2021-09-15 PROCEDURE — 80053 COMPREHEN METABOLIC PANEL: CPT

## 2021-09-15 PROCEDURE — 96401 CHEMO ANTI-NEOPL SQ/IM: CPT

## 2021-09-15 PROCEDURE — 85025 COMPLETE CBC W/AUTO DIFF WBC: CPT

## 2021-09-15 RX ORDER — CARVEDILOL 3.12 MG/1
3.12 TABLET ORAL 2 TIMES DAILY WITH MEALS
Qty: 180 TABLET | Refills: 1 | Status: SHIPPED | OUTPATIENT
Start: 2021-09-15 | End: 2021-09-30

## 2021-09-15 RX ADMIN — TRASTUZUMAB AND HYALURONIDASE-OYSK 600 MG: 600; 10000 INJECTION, SOLUTION SUBCUTANEOUS at 09:30

## 2021-09-15 NOTE — TELEPHONE ENCOUNTER
Called patient, HIPAA verified. Informed her that EF dropped from 60% to 52% and that Cardiologist is advising starting Coreg and repeating ECHO in 4 weeks. She is upset because she received Herceptin this morning. Informed her that this was fine as EF had not dropped 16% or more from baseline so was still within parameters to treat. Informed her that we will hold next Herceptin on 10/6/21 until after repeat ECHO is done. She verbalized understanding and was appreciative of call.

## 2021-09-15 NOTE — TELEPHONE ENCOUNTER
Patient called and stated that she is very confused in regards to a patient message she received from Dr. Joaquín Morales in regards to putting the herceptin on hold due to high risk of heart failure. Patient is very worried since she just finished getting her treatment this morning that she believes was supposed to be held.      Call back [phone]

## 2021-09-15 NOTE — PROGRESS NOTES
ejection fraction looks lower than we would like  Start Coreg 3.125 mg BID  See other note for plans  I responded to her [employer] Email

## 2021-09-15 NOTE — TELEPHONE ENCOUNTER
Verified patient with two types of identifiers. Spoke to patient about echo and plan in detail. She will start coreg 3.125mg BID this evening, see me in 2 weeks for BP check & Dr Parra/ab chung in 4 weeks. Reiterated our conversation in a Mychart message per patient's request. Patient verbalized understanding and appreciation for my time.      Future Appointments   Date Time Provider Joseph Workman   9/21/2021 10:00 AM MD ANNABELLE Machado BS AMB   9/21/2021  1:30 PM RAD ONC THERAPY [facility]   10/1/2021 11:00 AM Heaven Parker MD CAVREY BS AMB   10/5/2021 10:00 AM MD ANNABELLE Machado BS AMB   10/8/2021  9:30 AM JOHN CHUNG BS AMB   10/8/2021 10:00 AM Heaven Parker MD CAVREY BS AMB   10/27/2021  9:00 AM H2 MARTY [facility]ACK [facility]   11/17/2021  9:00 AM G1 MARTY [address]   12/3/2021  9:00 AM JOHN CHUNG BS AMB   12/6/2021  8:40 AM Heaven Parker MD CAVREY BS AMB   12/8/2021  9:00 AM G2 MARTY [address]

## 2021-09-15 NOTE — PROGRESS NOTES
[facility] VISIT NOTE    0915  Pt arrived at [facility] ambulatory and in no distress for C5D1 of Herceptin Hylecta. Assessment completed, no new complaints at this time. Pt denies all COVID symptoms and recent exposure. Pt prefers to do peripheral labs today since she just had her port flushed last week. Pt aware that port will need to be flushed next time and she verbalized understanding. Labs drawn peripherally from [facility] with no difficulty. Recent Results (from the past 12 hour(s))   CBC WITH AUTOMATED DIFF    Collection Time: 09/15/21  9:27 AM   Result Value Ref Range    WBC 4.0 3.6 - 11.0 K/uL    RBC 3.84 3.80 - 5.20 M/uL    HGB 12.5 11.5 - 16.0 g/dL    HCT 37.2 35.0 - 47.0 %    MCV 96.9 80.0 - 99.0 FL    MCH 32.6 26.0 - 34.0 PG    MCHC 33.6 30.0 - 36.5 g/dL    RDW 14.4 11.5 - 14.5 %    PLATELET 600 750 - 812 K/uL    MPV 11.1 8.9 - 12.9 FL    NRBC 0.0 0  WBC    ABSOLUTE NRBC 0.00 0.00 - 0.01 K/uL    NEUTROPHILS 59 32 - 75 %    LYMPHOCYTES 27 12 - 49 %    MONOCYTES 9 5 - 13 %    EOSINOPHILS 3 0 - 7 %    BASOPHILS 1 0 - 1 %    IMMATURE GRANULOCYTES 1 (H) 0.0 - 0.5 %    ABS. NEUTROPHILS 2.4 1.8 - 8.0 K/UL    ABS. LYMPHOCYTES 1.1 0.8 - 3.5 K/UL    ABS. MONOCYTES 0.3 0.0 - 1.0 K/UL    ABS. EOSINOPHILS 0.1 0.0 - 0.4 K/UL    ABS. BASOPHILS 0.1 0.0 - 0.1 K/UL    ABS. IMM. GRANS. 0.0 0.00 - 0.04 K/UL    DF AUTOMATED     METABOLIC PANEL, COMPREHENSIVE    Collection Time: 09/15/21  9:27 AM   Result Value Ref Range    Sodium 143 136 - 145 mmol/L    Potassium 4.0 3.5 - 5.1 mmol/L    Chloride 112 (H) 97 - 108 mmol/L    CO2 25 21 - 32 mmol/L    Anion gap 6 5 - 15 mmol/L    Glucose 85 65 - 100 mg/dL    BUN 15 6 - 20 MG/DL    Creatinine 0.80 0.55 - 1.02 MG/DL    BUN/Creatinine ratio 19 12 - 20      GFR est AA >60 >60 ml/min/1.73m2    GFR est non-AA >60 >60 ml/min/1.73m2    Calcium 8.9 8.5 - 10.1 MG/DL    Bilirubin, total 0.4 0.2 - 1.0 MG/DL    ALT (SGPT) 23 12 - 78 U/L    AST (SGOT) 16 15 - 37 U/L    Alk.  phosphatase 85 45 - 117 U/L    Protein, total 6.6 6.4 - 8.2 g/dL    Albumin 3.5 3.5 - 5.0 g/dL    Globulin 3.1 2.0 - 4.0 g/dL    A-G Ratio 1.1 1.1 - 2.2       Medications received:  Herceptin Hylecta SQ in left thigh     Blood pressure (!) 130/55, pulse 71, temperature (!) 96.6 °F (35.9 °C), resp. rate 18. Tolerated treatment well, no adverse reaction noted. 0940  D/C'd from [facility] ambulatory and in no distress.

## 2021-09-15 NOTE — TELEPHONE ENCOUNTER
Patient called stated that she just wanted to call again to get an update as she is very confused and scared now that she knows this information. Patient is upset that no one told her to not get her treatment today. Patient states she would like to speak with RN, but made patient aware that there was not an update at this moment and someone will give her a call as soon as possible.      Call back [phone]

## 2021-09-17 ENCOUNTER — TELEPHONE (OUTPATIENT)
Dept: PALLATIVE CARE | Age: 66
End: 2021-09-17

## 2021-09-17 NOTE — TELEPHONE ENCOUNTER
Called patient to advise/confirm upcoming appt with Dr. Raul Patel on 9/21/2021 at 10:00am at [facility]. No answer so left voicemail. Also advised to please bring in your photo ID and Insurance Card with you to appointment as well as any medication in the original container with you to appt.

## 2021-09-21 ENCOUNTER — HOSPITAL ENCOUNTER (OUTPATIENT)
Dept: RADIATION THERAPY | Age: 66
Discharge: HOME | End: 2021-09-21

## 2021-09-21 ENCOUNTER — OFFICE VISIT (OUTPATIENT)
Dept: PALLATIVE CARE | Age: 66
End: 2021-09-21

## 2021-09-21 DIAGNOSIS — F41.8 ANXIETY ABOUT HEALTH: Primary | ICD-10-CM

## 2021-09-21 PROCEDURE — 97810 ACUP 1/> WO ESTIM 1ST 15 MIN: CPT | Performed by: PHYSICAL MEDICINE & REHABILITATION

## 2021-09-21 NOTE — PROGRESS NOTES
Palliative Medicine and Integrative Medicine Acupuncture Note    Date Current Visit: 9/21/2021 VISIT 2  Date Initial Visit: 8/17/21  Requesting Physician: Nirav Diehl    Summary: Anxiety about cancer tx    Subjective: Ivy Max is a 77 y.o. female w/ a hx of cervical cancer in 1983, colon cancer in 2008 and R breast cancer s/p lumpectomy 4/2021 at [facility] and receiving adjuvant chemo w/ Dr Nirav Diehl- Taxol/Herceptin. With her other cancer dx, did not require chemo or radiation- so more anxious about the treatment than the actual cancer dx. Tolerating tx well- very mild nausea, no neuropathy, and keeping her energy up w/ good diet and regular exercise. At times at night feels like her \"whole body has restless leg syndrome\"     Has very supportive family including a niece in the area who had breast cancer in 2019 and went to acupuncturist Trevon Kevin. She is seeing Leah Bowen for meditation from the [facility] and a Reiki master. Social: From [place], went to school at [employer]. Supportive . Has a dtr and son- one in [address] and the other [place] [address] One 5 siblings- cancer runs in their family but so does longevity. INTERVAL HX  ~~~~~~~~~~~    9/21/21- Pt w/ mult stressors, feels like she is always going to the doctors. Recent echo w/ Dr Robert Dunn w/ EF 52% (down from 60%)- Herceptin on hold. Was worried, as received Herceptin while repeat echo was pending. Some fatigue and diarrhea. Functional status remains good- rides bike, does sit ups and weights for arms daily.         Past Medical History:   Past Medical History:   Diagnosis Date    Cancer ([address]     cervical and colon    Cervical cancer ([address] 1983    Colon cancer ([address] 2008        Past Surgical history:   Past Surgical History:   Procedure Laterality Date    ENDOSCOPY, COLON, DIAGNOSTIC          Family History:.   Family History   Problem Relation Age of Onset    Cancer Mother     Breast Cancer Mother     Cancer Father     Melanoma Father     Breast Cancer Sister     Colon Cancer Brother     Prostate Cancer Brother     Heart Disease Mother     Hypertension Sister          ROS:   ROS    The following systems were reviewed: constitutional, ears/nose/mouth/throat, respiratory, gastrointestinal, musculoskeletal, neurologic, psychiatric, endocrine. Positive findings noted below. Per above     Social history:   Social History     Socioeconomic History    Marital status:      Spouse name: Not on file    Number of children: Not on file    Years of education: Not on file    Highest education level: Not on file   Occupational History    Not on file   Tobacco Use    Smoking status: Never Smoker    Smokeless tobacco: Never Used   Vaping Use    Vaping Use: Never used   Substance and Sexual Activity    Alcohol use: Never    Drug use: Never    Sexual activity: Never   Other Topics Concern    Not on file   Social History Narrative    ** Merged History Encounter **          Social Determinants of Health     Financial Resource Strain:     Difficulty of Paying Living Expenses:    Food Insecurity:     Worried About Running Out of Food in the Last Year:     [address] in the Last Year:    Transportation Needs:     Lack of Transportation (Medical):  Lack of Transportation (Non-Medical):    Physical Activity:     Days of Exercise per Week:     Minutes of Exercise per Session:    Stress:     Feeling of Stress :    Social Connections: Unknown    Frequency of Communication with Friends and Family: More than three times a week    Frequency of Social Gatherings with Friends and Family: Not on file    Attends Hindu Services: Not on file   [employer]s or Organizations:  Yes    Attends Club or Organization Meetings: More than 4 times per year    Marital Status:    Intimate Partner Violence:     Fear of Current or Ex-Partner:     Emotionally Abused:     Physically Abused:     Sexually Abused: Physical Exam:     Labs reviewed from 9/15/21    Objective:     General appearance:  No apparent distress, well nourished, well groomed   HEENT: PERRLA, EOMI, nares clear, moist mucous membranes   Lungs: Clear to auscultation bilaterally   Abd:  +BS, soft, NTTP   Skin: Warm, dry   Psych:  Normal affect, appropriate   Msk Gait strong and balanced, grossly intact                Assessment and Plan:       ICD-10-CM ICD-9-CM    1. Anxiety about health  F41.8 300.09         Treatment Plan:   -Goals: Improved pain, function   -Type of acupuncture and number of treatments planned   -Other modalities or referrals       Acupuncture treatment performed after written and verbal consent obtained. Patient aware that risk include, but are not limited to: pain during needle insertion; bleeding/bruising; infections; internal organ perforation. All questions were answered. Time out performed immediately prior to the start of the procedure. Patient identified by name and date of birth and agreement on procedure being performed was verified. ~~~~~~~ ~~~~~~~~~~~~~~~~~~~~    *Tolerates needling well  *Uses donut hole of table  *Likes fan   *After first tx felt \"lighter\"    1. General well being and anxiety about health while undergoing chemotherapy: Supportive family, coping well and minimal side effects from chemo. Waiting for repeat echo.    -Alea Mark   -Tyler Yin-Tate Shun w/  SP6--> SP9 at 4 Hz x 15 min, LU6, LI11, ST 36    2. Diarrhea:    -B/l ST 36, CV6, CV12    ~~~~~~~~~~~~~~~~~~~~~~~~~~~    Pt tolerated procedure well without apparent complications. Pt advised to avoid strenuous sexual activity, exercise, heavy meals, alcohol for the next 24 hours. Aware that rebound effect may occur following treatment but that should improve back to baseline in several days.

## 2021-09-24 RX ORDER — ACETAMINOPHEN 325 MG/1
650 TABLET ORAL AS NEEDED
Status: CANCELLED
Start: 2021-10-27

## 2021-09-24 RX ORDER — SODIUM CHLORIDE 9 MG/ML
25 INJECTION, SOLUTION INTRAVENOUS CONTINUOUS
Status: CANCELLED | OUTPATIENT
Start: 2021-10-27

## 2021-09-24 RX ORDER — SODIUM CHLORIDE 9 MG/ML
10 INJECTION INTRAMUSCULAR; INTRAVENOUS; SUBCUTANEOUS AS NEEDED
Status: CANCELLED | OUTPATIENT
Start: 2021-10-27

## 2021-09-24 RX ORDER — HEPARIN 100 UNIT/ML
300-500 SYRINGE INTRAVENOUS AS NEEDED
Status: CANCELLED
Start: 2021-10-27

## 2021-09-24 RX ORDER — EPINEPHRINE 1 MG/ML
0.3 INJECTION, SOLUTION, CONCENTRATE INTRAVENOUS AS NEEDED
Status: CANCELLED | OUTPATIENT
Start: 2021-10-27

## 2021-09-24 RX ORDER — ACETAMINOPHEN 325 MG/1
650 TABLET ORAL
Status: CANCELLED | OUTPATIENT
Start: 2021-10-27

## 2021-09-24 RX ORDER — DIPHENHYDRAMINE HYDROCHLORIDE 50 MG/ML
25 INJECTION, SOLUTION INTRAMUSCULAR; INTRAVENOUS AS NEEDED
Status: CANCELLED
Start: 2021-10-27

## 2021-09-24 RX ORDER — DIPHENHYDRAMINE HYDROCHLORIDE 50 MG/ML
50 INJECTION, SOLUTION INTRAMUSCULAR; INTRAVENOUS AS NEEDED
Status: CANCELLED
Start: 2021-10-27

## 2021-09-24 RX ORDER — DIPHENHYDRAMINE HYDROCHLORIDE 50 MG/ML
50 INJECTION, SOLUTION INTRAMUSCULAR; INTRAVENOUS
Status: CANCELLED | OUTPATIENT
Start: 2021-10-27

## 2021-09-24 RX ORDER — HYDROCORTISONE SODIUM SUCCINATE 100 MG/2ML
100 INJECTION, POWDER, FOR SOLUTION INTRAMUSCULAR; INTRAVENOUS AS NEEDED
Status: CANCELLED | OUTPATIENT
Start: 2021-10-27

## 2021-09-24 RX ORDER — ONDANSETRON 2 MG/ML
8 INJECTION INTRAMUSCULAR; INTRAVENOUS AS NEEDED
Status: CANCELLED | OUTPATIENT
Start: 2021-10-27

## 2021-09-24 RX ORDER — SODIUM CHLORIDE 0.9 % (FLUSH) 0.9 %
10 SYRINGE (ML) INJECTION AS NEEDED
Status: CANCELLED | OUTPATIENT
Start: 2021-10-27

## 2021-09-24 RX ORDER — ALBUTEROL SULFATE 0.83 MG/ML
2.5 SOLUTION RESPIRATORY (INHALATION) AS NEEDED
Status: CANCELLED
Start: 2021-10-27

## 2021-09-27 ENCOUNTER — TELEPHONE (OUTPATIENT)
Dept: ONCOLOGY | Age: 66
End: 2021-09-27

## 2021-09-27 NOTE — TELEPHONE ENCOUNTER
Call to patient in response to [facility], ID verified X 2. Reviewed upcoming appt schedule. Patient is having ECHO/MD OV with Cardiology on 10/8/21. Scheduled for [facility]/MD OV for 10/13/21. Patient is unable to attend 10/13 session as will be travelling to visit daughter who needs surgery. Requests rescheduling to either 10/11 or 10/12 dependent upon Cardiology visit/clearance on 10/08 and [facility] availability. Patient does not wish to change future appointments from Wednesday schedule. To /Provider.

## 2021-09-29 ENCOUNTER — APPOINTMENT (OUTPATIENT)
Dept: INFUSION THERAPY | Age: 66
End: 2021-09-29

## 2021-09-29 DIAGNOSIS — E55.9 VITAMIN D DEFICIENCY: ICD-10-CM

## 2021-09-29 DIAGNOSIS — Z17.0 MALIGNANT NEOPLASM OF OVERLAPPING SITES OF RIGHT BREAST IN FEMALE, ESTROGEN RECEPTOR POSITIVE (HCC): ICD-10-CM

## 2021-09-29 DIAGNOSIS — C50.811 MALIGNANT NEOPLASM OF OVERLAPPING SITES OF RIGHT BREAST IN FEMALE, ESTROGEN RECEPTOR POSITIVE (HCC): ICD-10-CM

## 2021-09-29 RX ORDER — ASPIRIN 325 MG
50000 TABLET, DELAYED RELEASE (ENTERIC COATED) ORAL
Qty: 4 CAPSULE | Refills: 1 | Status: SHIPPED | OUTPATIENT
Start: 2021-09-29 | End: 2021-10-25

## 2021-09-30 ENCOUNTER — OFFICE VISIT (OUTPATIENT)
Dept: CARDIOLOGY CLINIC | Age: 66
End: 2021-09-30
Payer: MEDICARE

## 2021-09-30 ENCOUNTER — TELEPHONE (OUTPATIENT)
Dept: PALLATIVE CARE | Age: 66
End: 2021-09-30

## 2021-09-30 VITALS
RESPIRATION RATE: 18 BRPM | DIASTOLIC BLOOD PRESSURE: 70 MMHG | SYSTOLIC BLOOD PRESSURE: 110 MMHG | BODY MASS INDEX: 25.16 KG/M2 | HEIGHT: 65 IN | OXYGEN SATURATION: 98 % | HEART RATE: 72 BPM | WEIGHT: 151 LBS

## 2021-09-30 DIAGNOSIS — C50.811 MALIGNANT NEOPLASM OF OVERLAPPING SITES OF RIGHT BREAST IN FEMALE, ESTROGEN RECEPTOR POSITIVE (HCC): ICD-10-CM

## 2021-09-30 DIAGNOSIS — Z17.0 MALIGNANT NEOPLASM OF OVERLAPPING SITES OF RIGHT BREAST IN FEMALE, ESTROGEN RECEPTOR POSITIVE (HCC): ICD-10-CM

## 2021-09-30 DIAGNOSIS — T88.7XXA MEDICATION SIDE EFFECT: ICD-10-CM

## 2021-09-30 DIAGNOSIS — Z51.81 ENCOUNTER FOR MONITORING CARDIOTOXIC DRUG THERAPY: Primary | ICD-10-CM

## 2021-09-30 DIAGNOSIS — R06.09 DOE (DYSPNEA ON EXERTION): ICD-10-CM

## 2021-09-30 DIAGNOSIS — Z79.899 ENCOUNTER FOR MONITORING CARDIOTOXIC DRUG THERAPY: Primary | ICD-10-CM

## 2021-09-30 PROCEDURE — G8427 DOCREV CUR MEDS BY ELIG CLIN: HCPCS | Performed by: SPECIALIST

## 2021-09-30 PROCEDURE — G0463 HOSPITAL OUTPT CLINIC VISIT: HCPCS | Performed by: SPECIALIST

## 2021-09-30 PROCEDURE — 93010 ELECTROCARDIOGRAM REPORT: CPT | Performed by: SPECIALIST

## 2021-09-30 PROCEDURE — G9711 PT HX TOT COL OR COLON CA: HCPCS | Performed by: SPECIALIST

## 2021-09-30 PROCEDURE — 1090F PRES/ABSN URINE INCON ASSESS: CPT | Performed by: SPECIALIST

## 2021-09-30 PROCEDURE — G8510 SCR DEP NEG, NO PLAN REQD: HCPCS | Performed by: SPECIALIST

## 2021-09-30 PROCEDURE — G8536 NO DOC ELDER MAL SCRN: HCPCS | Performed by: SPECIALIST

## 2021-09-30 PROCEDURE — 1101F PT FALLS ASSESS-DOCD LE1/YR: CPT | Performed by: SPECIALIST

## 2021-09-30 PROCEDURE — 93005 ELECTROCARDIOGRAM TRACING: CPT | Performed by: SPECIALIST

## 2021-09-30 PROCEDURE — G8419 CALC BMI OUT NRM PARAM NOF/U: HCPCS | Performed by: SPECIALIST

## 2021-09-30 PROCEDURE — G9899 SCRN MAM PERF RSLTS DOC: HCPCS | Performed by: SPECIALIST

## 2021-09-30 PROCEDURE — G8400 PT W/DXA NO RESULTS DOC: HCPCS | Performed by: SPECIALIST

## 2021-09-30 PROCEDURE — 99214 OFFICE O/P EST MOD 30 MIN: CPT | Performed by: SPECIALIST

## 2021-09-30 NOTE — PROGRESS NOTES
Netta Goff     1955       Heaven Cote MD, [facility]  Date of Visit-9/30/2021   PCP is Ulises Meade MD   [facility]  [facility]  HPI:  Netta Goff is a 77 y.o. female   3 week f/u. Pt is referred by Will Peguero NP Oncology because of an abnormal echo. Echo on 8/23/21 showed a normal EF. The global strain was normal at -15. Pt with previous stress echo in 2013 that was normal. She is on her ceftin therapy. She had cervical cancer in 1983, colon cancer in 2008, now breast cancer. She had a lobectomy on April 1st, 2021.        Today. .. Last visit 9/7, previously had an echo with chemo that showed reduction in the GLS. We repeated her echo and there was a further reduction in the EF. We have decided to start carvedilol around the 10th of September, the EF was 52%. She had some itching around the eyelids. Pt states that her CC is her itching eye. She notes that it was itching vigorously last night. She reports that she feels she cant get a \"deep enough\" breath, and notes that it is infrequent. Denies chest pain, edema, syncope or shortness of breath at rest, has no tachycardia, palpitations or sense of arrhythmia. EKG: SR WNL HR 72  QTc 425 QRS 90 normal axis and intervals     Assessment/Plan:     Patient Instructions   Please stop your coreg. We will see you back with an echo prior. 1. Encounter for monitoring cardiotoxic drug therapy  Mild reduction in GLS, we recommended coreg but she is having issues itching around her eyes. Not sure if alpha effect. Going to have her stop the Coreg for about 7-10 days. Will see what her echo looks like on the 8th and call about possibly adding Toprol and see her back week of the 18th.   - AMB POC EKG ROUTINE W/ 12 LEADS, INTER & REP    2.  Malignant neoplasm of overlapping sites of right breast in female, estrogen receptor positive ([address]  Dr Kwan Saravia XRT   Had port removed by Dr Pedro Lezama [facility]  3, Medication side effect  Thinks beta blocker has been associated with red, itchy eyelids    4, VIVAR, sometimes cant catch full breath or deep to cough which is rare  Getting acupuncture         Impression:   1. Encounter for monitoring cardiotoxic drug therapy    2. Malignant neoplasm of overlapping sites of right breast in female, estrogen receptor positive ([facility]    3. Medication side effect    4. VIVAR (dyspnea on exertion)       Cardiac History:   No specialty comments available. Future Appointments   Date Time Provider Joseph Workman   10/5/2021 10:00 AM MD ANNABELLE Bro  AMB   10/8/2021  9:30 AM JOHN SOL BS AMB   10/8/2021 10:00 AM Heaven Parker MD CAVRE[facility]  AMB   10/26/2021 11:00 AM MD ANNABELLE Bro  AMB   10/27/2021  9:00 AM H2 MARTY FASTRACK [facility]   10/27/2021  9:30 AM Maximus Gleason  [address] BS AMB   11/16/2021 10:00 AM MD ANNABELLE Bro  AMB   11/17/2021  9:00 AM H2 MARTY FASTRACK [facility]   12/3/2021  9:00 AM JOHN SOL BS AMB   12/6/2021  8:40 AM Heaven Parker MD [facility]  AMB   12/7/2021 10:00 AM MD ANNABELLE Bro  AMB   12/8/2021  9:00 AM G1 MARTY FASTRACK [facility]        ROS-except as noted above. . A complete cardiac and respiratory are reviewed and negative except as above ; Resp-denies wheezing  or productive cough,. Const- No unusual weight loss or fever; Neuro-no recent seizure or CVA ; GI- No BRBPR, abdom pain, bloating ; - no  hematuria   see supplement sheet, initialed and to be scanned by staff  Past Medical History:   Diagnosis Date    Cancer ([facility]     cervical and colon    Cervical cancer ([facility] 1983    Colon cancer ([facility] 2008      Social Hx= reports that she has never smoked. She has never used smokeless tobacco. She reports that she does not drink alcohol and does not use drugs.      Exam and Labs:  /70 (BP 1 Location: Left arm, BP Patient Position: Sitting, BP Cuff Size: Adult)   Pulse 72   Resp 18   Ht 5' 5\" (1.651 m)   Wt 151 lb (68.5 kg)   SpO2 98%   BMI 25.13 kg/m² Constitutional:  NAD, comfortable  Head: NC,AT. Eyes: No scleral icterus. Neck:  Neck supple. No JVD present. Throat: moist mucous membranes. Chest: Effort normal & normal respiratory excursion . Neurological: alert, conversant and oriented . Skin: Skin is not cold. No obvious systemic rash noted. Not diaphoretic. No erythema. Psychiatric:  Grossly normal mood and affect. Behavior appears normal. Extremities:  no clubbing or cyanosis. Abdomen: non distended    Lungs:breath sounds normal. No stridor. distress, wheezes or  Rales. Heart: normal rate, regular rhythm, normal S1, S2, no murmurs, rubs, clicks or gallops , PMI non displaced. Edema: Edema is none. No results found for: CHOL, CHOLX, CHLST, CHOLV, HDL, HDLP, LDL, LDLC, DLDLP, TGLX, TRIGL, TRIGP, CHHD, CHHDX  Lab Results   Component Value Date/Time    Sodium 143 09/15/2021 09:27 AM    Potassium 4.0 09/15/2021 09:27 AM    Chloride 112 (H) 09/15/2021 09:27 AM    CO2 25 09/15/2021 09:27 AM    Anion gap 6 09/15/2021 09:27 AM    Glucose 85 09/15/2021 09:27 AM    BUN 15 09/15/2021 09:27 AM    Creatinine 0.80 09/15/2021 09:27 AM    BUN/Creatinine ratio 19 09/15/2021 09:27 AM    GFR est AA >60 09/15/2021 09:27 AM    GFR est non-AA >60 09/15/2021 09:27 AM    Calcium 8.9 09/15/2021 09:27 AM      Wt Readings from Last 3 Encounters:   09/30/21 151 lb (68.5 kg)   09/10/21 151 lb (68.5 kg)   09/08/21 151 lb 3.2 oz (68.6 kg)      BP Readings from Last 3 Encounters:   09/30/21 110/70   09/15/21 (!) 130/55   09/10/21 (!) 122/56      Current Outpatient Medications   Medication Sig    cholecalciferol (VITAMIN D3) (50,000 UNITS /1250 MCG) capsule TAKE 1 CAPSULE BY MOUTH EVERY SEVEN (7) DAYS.  sertraline (ZOLOFT) 50 mg tablet TAKE ONE TABLET BY MOUTH EVERY DAY     No current facility-administered medications for this visit.       Impression see above.      Written by Angie Lai, as dictated by Raudel Rhodes MD.

## 2021-09-30 NOTE — Clinical Note
9/30/2021    Patient: Nicko Leon   YOB: 1955   Date of Visit: 9/30/2021     Chad Leigh MD  [address] [place] [address]    Dear Chad Leigh MD,      Thank you for referring Ms. Nicko Loen to [facility] for evaluation. My notes for this consultation are attached. If you have questions, please do not hesitate to call me. I look forward to following your patient along with you.       Sincerely,    Maricruz Alanis MD

## 2021-10-01 ENCOUNTER — TELEPHONE (OUTPATIENT)
Dept: ONCOLOGY | Age: 66
End: 2021-10-01

## 2021-10-01 NOTE — TELEPHONE ENCOUNTER
Pt left VM stating she is having eye issues. Pt did not give any details. She is looking for advice and possible medication to help. Please advise and let pt know.       CB#[phone]

## 2021-10-01 NOTE — TELEPHONE ENCOUNTER
Follow up call to patient, ID verified X 2. Has been arranging flowers and had not seen Provider update sent via [facility]. Message relayed to patient who verbalized understanding. Has been following recommendations in earlier [facility] and plans to try Benadryl tonight. Will seek urgent care facility if fails to improve or worsens. Update to Provider.

## 2021-10-04 ENCOUNTER — HOSPITAL ENCOUNTER (OUTPATIENT)
Dept: RADIATION THERAPY | Age: 66
Discharge: HOME | End: 2021-10-04
Payer: MEDICARE

## 2021-10-04 PROCEDURE — 77470 SPECIAL RADIATION TREATMENT: CPT

## 2021-10-05 ENCOUNTER — OFFICE VISIT (OUTPATIENT)
Dept: PALLATIVE CARE | Age: 66
End: 2021-10-05

## 2021-10-05 DIAGNOSIS — F41.8 ANXIETY ABOUT HEALTH: Primary | ICD-10-CM

## 2021-10-05 PROCEDURE — 97810 ACUP 1/> WO ESTIM 1ST 15 MIN: CPT | Performed by: PHYSICAL MEDICINE & REHABILITATION

## 2021-10-05 NOTE — PROGRESS NOTES
Palliative Medicine and Integrative Medicine Acupuncture Note    Date Current Visit: 10/5/2021 VISIT 3  Date Initial Visit: 8/17/21  Requesting Physician: Shawn Fernandez    Summary: Anxiety about cancer tx    Subjective: Ekaterina Mckinley is a 77 y.o. female w/ a hx of cervical cancer in 1983, colon cancer in 2008 and R breast cancer s/p lumpectomy 4/2021 at [facility] and receiving adjuvant chemo w/ Dr Shawn Fernandez- Taxol/Herceptin. With her other cancer dx, did not require chemo or radiation- so more anxious about the treatment than the actual cancer dx. Tolerating tx well- very mild nausea, no neuropathy, and keeping her energy up w/ good diet and regular exercise. At times at night feels like her \"whole body has restless leg syndrome\"     Has very supportive family including a niece in the area who had breast cancer in 2019 and went to acupuncturist Pallavi Martínez. She is seeing Richi Ruggiero for meditation from the [facility] and a Reiki master. Social: From [place], went to school at [employer]. Supportive . Has a dtr and son- one in [place] [address] and the other in [place]. One 5 siblings- cancer runs in their family but so does longevity. INTERVAL HX  ~~~~~~~~~~~    10/5/21- Pt had port taken out and started on Coreg by Cardiology. However soon after both events has had significant pruritis of b/l eye lids w/ swelling and tearing. Dr Fei Sarmiento stopped the Coreg for 7-10 days. Pt was her ophthalmologist at \[facility]"" yesterday, started on an abx ointment. Also feeling incr stressors, worries and feeling \"frazzled\"- for example, forgot her wallet when getting gas. 9/21/21- Pt w/ mult stressors, feels like she is always going to the doctors. Recent echo w/ Dr Fei Sarmiento w/ EF 52% (down from 60%)- Herceptin on hold. Was worried, as received Herceptin while repeat echo was pending. Some fatigue and diarrhea.  Functional status remains good- rides bike, does sit ups and weights for arms daily.         Past Medical History:   Past Medical History:   Diagnosis Date    Cancer [facility])     cervical and colon    Cervical cancer ([facility] 1983    Colon cancer ([facility] 2008        Past Surgical history:   Past Surgical History:   Procedure Laterality Date    ENDOSCOPY, COLON, DIAGNOSTIC          Family History:. Family History   Problem Relation Age of Onset    Cancer Mother     Breast Cancer Mother     Cancer Father     Melanoma Father     Breast Cancer Sister     Colon Cancer Brother     Prostate Cancer Brother     Heart Disease Mother     Hypertension Sister          ROS:   ROS    The following systems were reviewed: constitutional, ears/nose/mouth/throat, respiratory, gastrointestinal, musculoskeletal, neurologic, psychiatric, endocrine. Positive findings noted below. Per above     Social history:   Social History     Socioeconomic History    Marital status:      Spouse name: Not on file    Number of children: Not on file    Years of education: Not on file    Highest education level: Not on file   Occupational History    Not on file   Tobacco Use    Smoking status: Never Smoker    Smokeless tobacco: Never Used   Vaping Use    Vaping Use: Never used   Substance and Sexual Activity    Alcohol use: Never    Drug use: Never    Sexual activity: Never   Other Topics Concern    Not on file   Social History Narrative    ** Merged History Encounter **          Social Determinants of Health     Financial Resource Strain:     Difficulty of Paying Living Expenses:    Food Insecurity:     Worried About Running Out of Food in the Last Year:     [address] in the Last Year:    Transportation Needs:     Lack of Transportation (Medical):      Lack of Transportation (Non-Medical):    Physical Activity:     Days of Exercise per Week:     Minutes of Exercise per Session:    Stress:     Feeling of Stress :    Social Connections: Unknown    Frequency of Communication with Friends and Family: More than three times a week    Frequency of Social Gatherings with Friends and Family: Not on file    Attends Mandaen Services: Not on file    Active Member of Clubs or Organizations: Yes    Attends Club or Organization Meetings: More than 4 times per year    Marital Status:    Intimate Partner Violence:     Fear of Current or Ex-Partner:     Emotionally Abused:     Physically Abused:     Sexually Abused:         Physical Exam:     Labs reviewed from 9/15/21    Objective:     General appearance:  No apparent distress, well nourished, well groomed   HEENT: Pupils equal, mild swelling and erythema of eyelids, nl sclera, nares clear, moist mucous membranes   Lungs: Unlabored   Skin: Warm, dry   Psych:  Normal affect, appropriate   Msk Gait strong and balanced, grossly intact                Assessment and Plan:       ICD-10-CM ICD-9-CM    1. Anxiety about health  F41.8 300.09         Treatment Plan:   -Goals: Improved pain, function   -Type of acupuncture and number of treatments planned   -Other modalities or referrals       Acupuncture treatment performed after written and verbal consent obtained. Patient aware that risk include, but are not limited to: pain during needle insertion; bleeding/bruising; infections; internal organ perforation. All questions were answered. Time out performed immediately prior to the start of the procedure. Patient identified by name and date of birth and agreement on procedure being performed was verified. ~~~~~~~ ~~~~~~~~~~~~~~~~~~~~    *Tolerates needling well  *Uses donut hole of table  *Likes fan   *After first tx felt \"lighter\"    1. General well being and anxiety about health while undergoing chemotherapy: Supportive family, coping well and minimal side effects from chemo. Waiting for repeat echo. Dragons: External: GV20 (+si henderson sharyn), BL 11, BL 23, BL 61. Internal: CV 14', ST25, ST 32, ST41. 11 minutes each. 2. Eyelid pruritis/allergic rxn(?)- Uncertain cause, saw Ohtho. Improving. -GV24.5, BL 2, and LI4 (special command point face)    ~~~~~~~~~~~~~~~~~~~~~~~~~~~    Pt tolerated procedure well without apparent complications. Pt advised to avoid strenuous sexual activity, exercise, heavy meals, alcohol for the next 24 hours. Aware that rebound effect may occur following treatment but that should improve back to baseline in several days.

## 2021-10-06 ENCOUNTER — APPOINTMENT (OUTPATIENT)
Dept: INFUSION THERAPY | Age: 66
End: 2021-10-06

## 2021-10-08 ENCOUNTER — ANCILLARY PROCEDURE (OUTPATIENT)
Dept: CARDIOLOGY CLINIC | Age: 66
End: 2021-10-08
Payer: MEDICARE

## 2021-10-08 VITALS
WEIGHT: 151 LBS | BODY MASS INDEX: 25.16 KG/M2 | SYSTOLIC BLOOD PRESSURE: 110 MMHG | DIASTOLIC BLOOD PRESSURE: 70 MMHG | HEIGHT: 65 IN

## 2021-10-08 DIAGNOSIS — Z79.899 ENCOUNTER FOR MONITORING CARDIOTOXIC DRUG THERAPY: ICD-10-CM

## 2021-10-08 DIAGNOSIS — Z51.81 ENCOUNTER FOR MONITORING CARDIOTOXIC DRUG THERAPY: ICD-10-CM

## 2021-10-08 PROCEDURE — 93306 TTE W/DOPPLER COMPLETE: CPT | Performed by: SPECIALIST

## 2021-10-08 PROCEDURE — 93308 TTE F-UP OR LMTD: CPT | Performed by: SPECIALIST

## 2021-10-09 LAB
ECHO LV EDV A2C: 97.75 ML
ECHO LV EDV A4C: 99.36 ML
ECHO LV EDV BP: 99.65 ML (ref 56–104)
ECHO LV EDV INDEX A4C: 56.5 ML/M2
ECHO LV EDV INDEX BP: 56.6 ML/M2
ECHO LV EDV NDEX A2C: 55.5 ML/M2
ECHO LV EJECTION FRACTION A2C: 45 PERCENT
ECHO LV EJECTION FRACTION A4C: 49 PERCENT
ECHO LV EJECTION FRACTION BIPLANE: 46.9 PERCENT (ref 55–100)
ECHO LV ESV A2C: 54.03 ML
ECHO LV ESV A4C: 51.06 ML
ECHO LV ESV BP: 52.92 ML (ref 19–49)
ECHO LV ESV INDEX A2C: 30.7 ML/M2
ECHO LV ESV INDEX A4C: 29 ML/M2
ECHO LV ESV INDEX BP: 30.1 ML/M2
ECHO LV GLOBAL LONGITUDINAL STRAIN (GLS): -16 PERCENT
ECHO LV INTERNAL DIMENSION DIASTOLIC: 3.76 CM (ref 3.9–5.3)
ECHO LV INTERNAL DIMENSION SYSTOLIC: 2.79 CM
ECHO LV IVSD: 0.88 CM (ref 0.6–0.9)
ECHO LV MASS 2D: 97.1 G (ref 67–162)
ECHO LV MASS INDEX 2D: 55.2 G/M2 (ref 43–95)
ECHO LV POSTERIOR WALL DIASTOLIC: 0.89 CM (ref 0.6–0.9)
GLOBAL LONGITUDINAL STRAIN 2 CHAMBER: -14.5 PERCENT
GLOBAL LONGITUDINAL STRAIN 4 CHAMBER: -16.6 PERCENT
GLOBAL LONGITUDINAL STRAIN LONG AXIS: -17 PERCENT

## 2021-10-11 ENCOUNTER — PATIENT MESSAGE (OUTPATIENT)
Dept: CARDIOLOGY CLINIC | Age: 66
End: 2021-10-11

## 2021-10-11 ENCOUNTER — APPOINTMENT (OUTPATIENT)
Dept: INFUSION THERAPY | Age: 66
End: 2021-10-11

## 2021-10-12 ENCOUNTER — PATIENT MESSAGE (OUTPATIENT)
Dept: CARDIOLOGY CLINIC | Age: 66
End: 2021-10-12

## 2021-10-12 DIAGNOSIS — Z51.81 ENCOUNTER FOR MONITORING CARDIOTOXIC DRUG THERAPY: ICD-10-CM

## 2021-10-12 DIAGNOSIS — R21 RASH: Primary | ICD-10-CM

## 2021-10-12 DIAGNOSIS — Z17.0 MALIGNANT NEOPLASM OF OVERLAPPING SITES OF RIGHT BREAST IN FEMALE, ESTROGEN RECEPTOR POSITIVE (HCC): ICD-10-CM

## 2021-10-12 DIAGNOSIS — Z79.899 ENCOUNTER FOR MONITORING CARDIOTOXIC DRUG THERAPY: ICD-10-CM

## 2021-10-12 DIAGNOSIS — C50.811 MALIGNANT NEOPLASM OF OVERLAPPING SITES OF RIGHT BREAST IN FEMALE, ESTROGEN RECEPTOR POSITIVE (HCC): ICD-10-CM

## 2021-10-12 RX ORDER — METOPROLOL SUCCINATE 25 MG/1
25 TABLET, EXTENDED RELEASE ORAL DAILY
Qty: 30 TABLET | Refills: 1 | Status: SHIPPED | OUTPATIENT
Start: 2021-10-12 | End: 2021-12-07 | Stop reason: SDUPTHER

## 2021-10-12 RX ORDER — METOPROLOL SUCCINATE 25 MG/1
25 TABLET, EXTENDED RELEASE ORAL DAILY
Qty: 30 TABLET | Refills: 1 | Status: SHIPPED | OUTPATIENT
Start: 2021-10-12 | End: 2021-10-12 | Stop reason: SDUPTHER

## 2021-10-12 NOTE — TELEPHONE ENCOUNTER
Called and spoke to patient -ID X2     I spoke to Sj today about her echo results. The changes been that the EF is 47%. We went over the GLS scores which are about the same 15.4-16.3%  with the most recent echo about 16%. It is compared to [place] which showed an EF of 60-65 and an echo at [facility] that showed perhaps 55-60 I told her those were probably equivalent based on  normal templates but what we are seeing now with a drop to 52 and now 47 is a difference. The rash is sort of a unusual finding and probably not related to the Coreg (happened after being on it for 10 days)  it seemed to happen the day after the port removal.  I have asked her to see dermatology and take pictures because of just such an unusual finding. She did see her eye specialist who gave her a steroid cream and that while that cost her $230 she said is made a dramatic difference in  the rash around the eyes. She is getting it on and off in the thighs and in her upper chest.    She has no CHF symptoms & she denies any chest pain shortness of breath edema or orthopnea or orthostasis. I have reassured her that while there are some findings this will limit us using Herceptin at this point I do not consider her to be in heart failure. I am going to have her start the Toprol which is already been called to her and then I will see her back in 6 weeks with an echo 1 week prior to go over. She knows to call me if she gets any problems with CHF symptoms. She is in [place] for another week since her daughter is having her hysterectomy. 1. She will  start Toprol-XL that has been called in  2. See dermatology we will try to expedite  3. Follow-up 6 weeks with echo 1 week prior. 4.  Recommend she not continue with Herceptin. Dr. Claire Delacruz and I have already   communicated.

## 2021-10-12 NOTE — TELEPHONE ENCOUNTER
Patient calling due to [facility] stating they have not received the prescription for metoprolol succinate (TOPROL-XL) 25 mg XL tablet. Patient is currently at the pharmacy. Phone: [phone] and spoke with Monty who informed she will call Apparent to authorize.

## 2021-10-12 NOTE — TELEPHONE ENCOUNTER
Verified patient with two types of identifiers. Spoke to pharmacy tech who reports they have not received any of the escribes sent.  Waited on hold for 15 minutes and gave VO to pharmacist per VO by MD.

## 2021-10-12 NOTE — TELEPHONE ENCOUNTER
----- Message from Isaias Cano RN sent at 10/12/2021  9:21 AM EDT -----  Regarding: FW: Non-Urgent Medical Question  Contact: [phone]    ----- Message -----  From: Gloria Enriquez RN  Sent: 10/12/2021   8:31 AM EDT  To: Isaias Cano RN  Subject: FW: Non-Urgent Medical Question                    ----- Message -----  From: French Lopez  Sent: 10/12/2021   8:25 AM EDT  To: [facility]  Subject: RE: Non-Urgent Medical Question                    I would appreciate speaking with you. I will keep my phone handy this afternoon.   Thank you   Luz

## 2021-10-12 NOTE — PROGRESS NOTES
ejection fraction is reduced   Pause Herceptin suggested  Did not tolerate Coreg  Will try Toprol XL 25 mg and see in 3-4 weeks  Nurse to call pt for test result   Sent [employer] message, glad to talk to her on phone later today

## 2021-10-13 NOTE — TELEPHONE ENCOUNTER
Attempted to reach patient by telephone. A message was left for return call.      Faxed derm referral to several dermatologists in [place], [place].

## 2021-10-20 ENCOUNTER — APPOINTMENT (OUTPATIENT)
Dept: INFUSION THERAPY | Age: 66
End: 2021-10-20

## 2021-10-25 DIAGNOSIS — C50.811 MALIGNANT NEOPLASM OF OVERLAPPING SITES OF RIGHT BREAST IN FEMALE, ESTROGEN RECEPTOR POSITIVE (HCC): ICD-10-CM

## 2021-10-25 DIAGNOSIS — Z17.0 MALIGNANT NEOPLASM OF OVERLAPPING SITES OF RIGHT BREAST IN FEMALE, ESTROGEN RECEPTOR POSITIVE (HCC): ICD-10-CM

## 2021-10-25 DIAGNOSIS — E55.9 VITAMIN D DEFICIENCY: ICD-10-CM

## 2021-10-25 RX ORDER — ASPIRIN 325 MG
50000 TABLET, DELAYED RELEASE (ENTERIC COATED) ORAL
Qty: 4 CAPSULE | Refills: 1 | Status: SHIPPED | OUTPATIENT
Start: 2021-10-25 | End: 2021-11-10 | Stop reason: SDUPTHER

## 2021-10-26 PROBLEM — T88.7XXA MEDICATION SIDE EFFECT: Status: ACTIVE | Noted: 2021-10-26

## 2021-10-26 PROBLEM — R06.09 DOE (DYSPNEA ON EXERTION): Status: ACTIVE | Noted: 2021-10-26

## 2021-10-27 ENCOUNTER — APPOINTMENT (OUTPATIENT)
Dept: INFUSION THERAPY | Age: 66
End: 2021-10-27

## 2021-11-01 ENCOUNTER — HOSPITAL ENCOUNTER (OUTPATIENT)
Dept: RADIATION THERAPY | Age: 66
Discharge: HOME | End: 2021-11-01

## 2021-11-02 ENCOUNTER — HOSPITAL ENCOUNTER (OUTPATIENT)
Dept: RADIATION THERAPY | Age: 66
Discharge: HOME | End: 2021-11-02

## 2021-11-03 ENCOUNTER — APPOINTMENT (OUTPATIENT)
Dept: INFUSION THERAPY | Age: 66
End: 2021-11-03

## 2021-11-03 ENCOUNTER — HOSPITAL ENCOUNTER (OUTPATIENT)
Dept: RADIATION THERAPY | Age: 66
Discharge: HOME | End: 2021-11-03

## 2021-11-03 RX ORDER — METOPROLOL SUCCINATE 25 MG/1
TABLET, EXTENDED RELEASE ORAL
Qty: 30 TABLET | Refills: 1 | Status: CANCELLED | OUTPATIENT
Start: 2021-11-03

## 2021-11-04 ENCOUNTER — HOSPITAL ENCOUNTER (OUTPATIENT)
Dept: RADIATION THERAPY | Age: 66
Discharge: HOME | End: 2021-11-04

## 2021-11-05 ENCOUNTER — HOSPITAL ENCOUNTER (OUTPATIENT)
Dept: RADIATION THERAPY | Age: 66
Discharge: HOME | End: 2021-11-05

## 2021-11-08 ENCOUNTER — HOSPITAL ENCOUNTER (OUTPATIENT)
Dept: RADIATION THERAPY | Age: 66
Discharge: HOME | End: 2021-11-08

## 2021-11-09 ENCOUNTER — PATIENT MESSAGE (OUTPATIENT)
Dept: CARDIOLOGY CLINIC | Age: 66
End: 2021-11-09

## 2021-11-09 ENCOUNTER — HOSPITAL ENCOUNTER (OUTPATIENT)
Dept: RADIATION THERAPY | Age: 66
Discharge: HOME | End: 2021-11-09

## 2021-11-10 ENCOUNTER — TELEPHONE (OUTPATIENT)
Dept: PALLATIVE CARE | Age: 66
End: 2021-11-10

## 2021-11-10 ENCOUNTER — APPOINTMENT (OUTPATIENT)
Dept: INFUSION THERAPY | Age: 66
End: 2021-11-10

## 2021-11-10 ENCOUNTER — HOSPITAL ENCOUNTER (OUTPATIENT)
Dept: RADIATION THERAPY | Age: 66
Discharge: HOME | End: 2021-11-10

## 2021-11-10 DIAGNOSIS — E55.9 VITAMIN D DEFICIENCY: ICD-10-CM

## 2021-11-10 DIAGNOSIS — Z17.0 MALIGNANT NEOPLASM OF OVERLAPPING SITES OF RIGHT BREAST IN FEMALE, ESTROGEN RECEPTOR POSITIVE (HCC): ICD-10-CM

## 2021-11-10 DIAGNOSIS — C50.811 MALIGNANT NEOPLASM OF OVERLAPPING SITES OF RIGHT BREAST IN FEMALE, ESTROGEN RECEPTOR POSITIVE (HCC): ICD-10-CM

## 2021-11-10 RX ORDER — HEPARIN 100 UNIT/ML
500 SYRINGE INTRAVENOUS AS NEEDED
Status: CANCELLED | OUTPATIENT
Start: 2021-11-17

## 2021-11-10 RX ORDER — ASPIRIN 325 MG
50000 TABLET, DELAYED RELEASE (ENTERIC COATED) ORAL
Qty: 4 CAPSULE | Refills: 3 | Status: SHIPPED | OUTPATIENT
Start: 2021-11-10 | End: 2022-01-10 | Stop reason: SDUPTHER

## 2021-11-10 RX ORDER — SODIUM CHLORIDE 9 MG/ML
10 INJECTION INTRAMUSCULAR; INTRAVENOUS; SUBCUTANEOUS AS NEEDED
Status: CANCELLED | OUTPATIENT
Start: 2021-11-17

## 2021-11-10 RX ORDER — SODIUM CHLORIDE 0.9 % (FLUSH) 0.9 %
5-10 SYRINGE (ML) INJECTION AS NEEDED
Status: CANCELLED | OUTPATIENT
Start: 2021-11-17

## 2021-11-10 RX ORDER — METOPROLOL SUCCINATE 25 MG/1
25 TABLET, EXTENDED RELEASE ORAL
Qty: 90 TABLET | Refills: 1 | OUTPATIENT
Start: 2021-11-10

## 2021-11-10 NOTE — TELEPHONE ENCOUNTER
Called patient to advise/confirm upcoming appt with Dr. Justin Mehta on 11/16/2021 at 10:00am at [facility]. No answer so left voicemail. Also advised to please bring in your photo ID with you to appointment.

## 2021-11-11 ENCOUNTER — HOSPITAL ENCOUNTER (OUTPATIENT)
Dept: RADIATION THERAPY | Age: 66
Discharge: HOME | End: 2021-11-11

## 2021-11-12 ENCOUNTER — HOSPITAL ENCOUNTER (OUTPATIENT)
Dept: RADIATION THERAPY | Age: 66
Discharge: HOME | End: 2021-11-12

## 2021-11-15 ENCOUNTER — HOSPITAL ENCOUNTER (OUTPATIENT)
Dept: RADIATION THERAPY | Age: 66
Discharge: HOME | End: 2021-11-15

## 2021-11-15 NOTE — TELEPHONE ENCOUNTER
Called patient to ask if she could come in tomorrow 11/16//2021 at 9:50am in lieu of 10:00am.  No answer so left voicemail.

## 2021-11-16 ENCOUNTER — HOSPITAL ENCOUNTER (OUTPATIENT)
Dept: RADIATION THERAPY | Age: 66
Discharge: HOME | End: 2021-11-16

## 2021-11-16 ENCOUNTER — OFFICE VISIT (OUTPATIENT)
Dept: PALLATIVE CARE | Age: 66
End: 2021-11-16

## 2021-11-16 DIAGNOSIS — R21 RASH: Primary | ICD-10-CM

## 2021-11-16 PROCEDURE — 97813 ACUP 1/> W/ESTIM 1ST 15 MIN: CPT | Performed by: PHYSICAL MEDICINE & REHABILITATION

## 2021-11-16 NOTE — PROGRESS NOTES
Palliative Medicine and Integrative Medicine Acupuncture Note    Date Current Visit: 11/16/2021 VISIT 4  Date Initial Visit: 8/17/21  Requesting Physician: Carlos Freitas    Summary: Anxiety about cancer tx    Subjective: Brooklyn Chávez is a 74 y.o. female w/ a hx of cervical cancer in 1983, colon cancer in 2008 and R breast cancer s/p lumpectomy 4/2021 at [facility] and receiving adjuvant chemo w/ Dr Carlos Freitas- Taxol/Herceptin. With her other cancer dx, did not require chemo or radiation- so more anxious about the treatment than the actual cancer dx. Tolerating tx well- very mild nausea, no neuropathy, and keeping her energy up w/ good diet and regular exercise. At times at night feels like her \"whole body has restless leg syndrome\"     Has very supportive family including a niece in the area who had breast cancer in 2019 and went to acupuncturist [facility]. She is seeing Remigio Joseph for meditation from the [facility] and a Reiki master. Social: From [place], went to school at [employer]. Supportive . Has a dtr and son- one in [place] [address] and the other in [place]. One 5 siblings- cancer runs in their family but so does longevity. INTERVAL HX  ~~~~~~~~~~~    11/16/21- Last session had itchy eyelids which she attributed to [address]. Now on metoprolol, since then has had slightly pruritic rash on legs and chest- saw Derm while in [place] (was there to support her dtr going through surgery) and had a topical steroid prescribed. Still present. Energy fair. 10/5/21- Pt had port taken out and started on Coreg by Cardiology. However soon after both events has had significant pruritis of b/l eye lids w/ swelling and tearing. Dr Denys Raygoza stopped the Coreg for 7-10 days. Pt was her ophthalmologist at [facility] yesterday, started on an abx ointment. Also feeling incr stressors, worries and feeling \"frazzled\"- for example, forgot her wallet when getting gas.      9/21/21- Pt w/ mult stressors, feels like she is always going to the doctors. Recent echo w/ Dr Sami Steward w/ EF 52% (down from 60%)- Herceptin on hold. Was worried, as received Herceptin while repeat echo was pending. Some fatigue and diarrhea. Functional status remains good- rides bike, does sit ups and weights for arms daily.         Past Medical History:   Past Medical History:   Diagnosis Date    Cancer ([facility]     cervical and colon    Cervical cancer ([facility] 1983    Colon cancer ([facility] 2008        Past Surgical history:   Past Surgical History:   Procedure Laterality Date    ENDOSCOPY, COLON, DIAGNOSTIC          Family History:. Family History   Problem Relation Age of Onset    Cancer Mother     Breast Cancer Mother     Cancer Father     Melanoma Father     Breast Cancer Sister     Colon Cancer Brother     Prostate Cancer Brother     Heart Disease Mother     Hypertension Sister          ROS:   ROS    The following systems were reviewed: constitutional, ears/nose/mouth/throat, respiratory, gastrointestinal, musculoskeletal, neurologic, psychiatric, endocrine. Positive findings noted below.     Per above     Social history:   Social History     Socioeconomic History    Marital status:      Spouse name: Not on file    Number of children: Not on file    Years of education: Not on file    Highest education level: Not on file   Occupational History    Not on file   Tobacco Use    Smoking status: Never Smoker    Smokeless tobacco: Never Used   Vaping Use    Vaping Use: Never used   Substance and Sexual Activity    Alcohol use: Never    Drug use: Never    Sexual activity: Never   Other Topics Concern    Not on file   Social History Narrative    ** Merged History Encounter **          Social Determinants of Health     Financial Resource Strain:     Difficulty of Paying Living Expenses: Not on file   Food Insecurity:     Worried About Running Out of Food in the Last Year: Not on file    Yann of Food in the Last Year: Not on file   Transportation Needs:  Lack of Transportation (Medical): Not on file    Lack of Transportation (Non-Medical): Not on file   Physical Activity:     Days of Exercise per Week: Not on file    Minutes of Exercise per Session: Not on file   Stress:     Feeling of Stress : Not on file   Social Connections: Unknown    Frequency of Communication with Friends and Family: More than three times a week    Frequency of Social Gatherings with Friends and Family: Not on file    Attends Yazidism Services: Not on file   [employer] of Clubs or Organizations: Yes    Attends Club or Organization Meetings: More than 4 times per year    Marital Status:    Intimate Partner Violence:     Fear of Current or Ex-Partner: Not on file    Emotionally Abused: Not on file    Physically Abused: Not on file    Sexually Abused: Not on file   Housing Stability:     Unable to Pay for Housing in the Last Year: Not on file    Number of [place] in the Last Year: Not on file    Unstable Housing in the Last Year: Not on file        Physical Exam:     Labs reviewed from 9/15/21    Objective:     General appearance:  No apparent distress, well nourished, well groomed   HEENT: nl sclera, nares clear, moist mucous membranes   Lungs: Unlabored   Skin: Warm, dry, erythema over chest, rash anterior thighs   Psych:  Normal affect, appropriate   Msk Gait strong and balanced, grossly intact                Assessment and Plan:       ICD-10-CM ICD-9-CM    1. Rash  R21 782.1         Treatment Plan:   -Goals: Improved pain, function   -Type of acupuncture and number of treatments planned   -Other modalities or referrals       Acupuncture treatment performed after written and verbal consent obtained. Patient aware that risk include, but are not limited to: pain during needle insertion; bleeding/bruising; infections; internal organ perforation. All questions were answered. Time out performed immediately prior to the start of the procedure.  Patient identified by name and date of birth and agreement on procedure being performed was verified. ~~~~~~~ ~~~~~~~~~~~~~~~~~~~~    *Tolerates needling well  *Uses donut hole of table  *Likes fan   *After first tx felt \"lighter\"    1. General well being and anxiety about health while undergoing chemotherapy: Supportive family, coping well and minimal side effects from chemo. -GV20  -Nigel Mu Kaila x 10 min     2. Pruritic rash: Uncertain cause, has seen Derm. Cardiology does not feel related to beta blocker.     -Auricular: Dwight Buck men, Pt0, thalamus, Master skin, skin disorder  -B/l SP6, ST36, LU6 x 10 min     ~~~~~~~~~~~~~~~~~~~~~~~~~~~    Pt tolerated procedure well without apparent complications. Pt advised to avoid strenuous sexual activity, exercise, heavy meals, alcohol for the next 24 hours. Aware that rebound effect may occur following treatment but that should improve back to baseline in several days.

## 2021-11-17 ENCOUNTER — HOSPITAL ENCOUNTER (OUTPATIENT)
Dept: RADIATION THERAPY | Age: 66
Discharge: HOME | End: 2021-11-17

## 2021-11-17 ENCOUNTER — OFFICE VISIT (OUTPATIENT)
Dept: ONCOLOGY | Age: 66
End: 2021-11-17
Payer: MEDICARE

## 2021-11-17 ENCOUNTER — APPOINTMENT (OUTPATIENT)
Dept: INFUSION THERAPY | Age: 66
End: 2021-11-17

## 2021-11-17 VITALS
SYSTOLIC BLOOD PRESSURE: 115 MMHG | TEMPERATURE: 96 F | WEIGHT: 150.1 LBS | HEART RATE: 71 BPM | BODY MASS INDEX: 25.01 KG/M2 | DIASTOLIC BLOOD PRESSURE: 73 MMHG | OXYGEN SATURATION: 96 % | HEIGHT: 65 IN

## 2021-11-17 DIAGNOSIS — C50.811 MALIGNANT NEOPLASM OF OVERLAPPING SITES OF RIGHT BREAST IN FEMALE, ESTROGEN RECEPTOR POSITIVE (HCC): ICD-10-CM

## 2021-11-17 DIAGNOSIS — Z17.0 MALIGNANT NEOPLASM OF OVERLAPPING SITES OF RIGHT BREAST IN FEMALE, ESTROGEN RECEPTOR POSITIVE (HCC): ICD-10-CM

## 2021-11-17 PROCEDURE — G8432 DEP SCR NOT DOC, RNG: HCPCS | Performed by: INTERNAL MEDICINE

## 2021-11-17 PROCEDURE — 99215 OFFICE O/P EST HI 40 MIN: CPT | Performed by: INTERNAL MEDICINE

## 2021-11-17 PROCEDURE — 1090F PRES/ABSN URINE INCON ASSESS: CPT | Performed by: INTERNAL MEDICINE

## 2021-11-17 PROCEDURE — G8400 PT W/DXA NO RESULTS DOC: HCPCS | Performed by: INTERNAL MEDICINE

## 2021-11-17 PROCEDURE — G8536 NO DOC ELDER MAL SCRN: HCPCS | Performed by: INTERNAL MEDICINE

## 2021-11-17 PROCEDURE — G8420 CALC BMI NORM PARAMETERS: HCPCS | Performed by: INTERNAL MEDICINE

## 2021-11-17 PROCEDURE — 1101F PT FALLS ASSESS-DOCD LE1/YR: CPT | Performed by: INTERNAL MEDICINE

## 2021-11-17 PROCEDURE — G8427 DOCREV CUR MEDS BY ELIG CLIN: HCPCS | Performed by: INTERNAL MEDICINE

## 2021-11-17 PROCEDURE — G9711 PT HX TOT COL OR COLON CA: HCPCS | Performed by: INTERNAL MEDICINE

## 2021-11-17 PROCEDURE — G0463 HOSPITAL OUTPT CLINIC VISIT: HCPCS | Performed by: NURSE PRACTITIONER

## 2021-11-17 PROCEDURE — G9899 SCRN MAM PERF RSLTS DOC: HCPCS | Performed by: INTERNAL MEDICINE

## 2021-11-17 NOTE — PATIENT INSTRUCTIONS
Anastrozole (Arimidex®)   This information is about a hormonal therapy used to treat breast cancer called anastrozole, which is also called Arimidex®. Throughout this page we refer to it by its more commonly used name, Arimidex. This information should ideally be read with our general information about breast cancer or secondary breast cancer. Arimidex   Arimidex is a type of hormonal therapy used to treat breast cancer in women who have been through the menopause (change of life). You will see your doctor regularly while you have this treatment so they can monitor its effects. Hormonal therapies interfere with the production or action of particular hormones in the body. Hormones are substances produced naturally in the body. They act as chemical messengers and help control the activity of cells and organs. Aromatase inhibitors   Many breast cancers rely on the hormone oestrogen to grow. These cancers are known as hormone-sensitive breast cancers. In women who have had their menopause, the main source of oestrogen is through the conversion of androgens (sex hormones produced by the adrenal glands) into oestrogens. This is carried out by an enzyme called aromatase. The conversion process is known as aromatisation, and it happens mainly in the fatty tissues of the body. Arimidex is a drug that blocks the process of aromatisation, and so reduces the amount of oestrogen in the body. As less oestrogen reaches the cancer cells, they grow more slowly or stop growing altogether. Drugs that work in this way are known as aromatase inhibitors. Other aromatase inhibitors include letrozole (Femara®) and exemestane (Aromasin®). How Arimidex is taken   Arimidex is a tablet that is taken once a day. It should be swallowed whole with a glass of water, at about the same time each day. It doesn't matter whether this is in the morning or evening.    When it is given   Your doctor will take into account a number of different factors when planning your treatment. Arimidex is used to treat post-menopausal women with hormone-sensitive breast cancer. Early breast cancer   Arimidex may be given to women with early breast cancer (cancer that has not spread) after they have had surgery to remove the cancer. Giving treatment after surgery to reduce the chance of the cancer coming back is known as adjuvant therapy. For some women, Arimidex may be more effective than tamoxifen, and it has different side effects. Studies show that switching to Arimidex after taking tamoxifen for 2-3 years may be better than five years of tamoxifen for some women. Advanced breast cancer   Arimidex can be used to treat women who have breast cancer that has spread to other parts of the body (advanced or secondary breast cancer). It can also be used to treat women whose breast cancer has come back after initial treatment. You might find our information about staging and grading of breast cancer useful. Length of treatment   Your doctors will discuss the length of treatment they feel is appropriate for you. Arimidex may be given over a number of years, or for as long as it is controlling your cancer, depending on your individual situation. Possible side effects   Each person's reaction to any medicine is different. Most people have very few side effects with Arimidex, while others may experience more. The side effects described here won't affect everyone and may be different if you are taking more than one drug. We have outlined the most common side effects, but haven't included those that are rare and therefore unlikely to affect you. If you notice any effects which aren't listed here, discuss them with your doctor or nurse. You may have some of the following side effects, to varying degrees:   Hot flushes and sweats   These are usually mild and may wear off after a period of time.  Sometimes people find it helps to cut down on tea, coffee, nicotine and alcohol. Research suggests that hormones called progestogens or some types of antidepressants may be helpful in controlling this side effect. Your doctor or nurse can discuss this with you. Some people find complementary therapies such as acupuncture helpful. Your GP may be able to give you details about having these on the NHS. If you find your own therapist, make sure that they are properly qualified and registered. You can read more about treatments for menopausal symptoms like hot flushes in our information about managing menopausal symptoms. Vaginal dryness   This may occur while using Arimidex. Gels that can help to overcome the dryness are available. You can buy these from a  or your doctor can prescribe them. Feeling sick (nausea) and being sick (vomiting)   These side effects are rare and usually mild. They can usually be effectively treated, so let your doctor know if you are affected. Feeling sick can often be relieved by taking your tablet with food or at night. Diarrhoea   If you have diarrhoea it's important to drink plenty of fluids. Hair thinning   Some women notice that their hair becomes thinner while taking Arimidex. This is usually mild and the hair grows back at the end of treatment. Headaches   Some people have headaches while taking Arimidex, but this is not common. It's important to drink plenty of fluids. Let your doctor know if you are getting headaches, as they can prescribe medication to help. Skin rashes   Rarely, Arimidex can cause skin rashes. Vaginal bleeding   Some women have some vaginal bleeding, usually in the first few weeks of treatment. This is rare and usually occurs after changing from other hormonal therapies to treatment with Arimidex. If the bleeding continues, tell your doctor or breast care nurse. Joint pains/muscular stiffness   Some women have pain and stiffness in their joints while taking Arimidex.  Let your doctor know if these effects are a problem. You may find it helpful to take mild painkillers. Tiredness (fatigue) and lethargy   Some people can have increased tiredness, especially at the start of treatment. It's important to get plenty of rest. If you are very sleepy you should not drive or operate machinery. Risk of osteoporosis   Women who have, or are at risk of, osteoporosis (weakened bones), should have their bone strength assessed before and during treatment with Arimidex. Some women may need to take bone-strengthening drugs to help prevent osteoporosis developing. Always let your doctor or nurse know about any side effects you have. There are usually ways in which they can be controlled or improved. Things to remember about Arimidex tablets    Arimidex may interact with other medicines. Tell your doctor about any medicines you are taking, including non-prescribed drugs such as complementary therapies, vitamins and herbal drugs.  Keep the tablets in a safe place, out of the reach of children.  Keep the tablets in the original packaging and store them at room temperature, away from heat and direct sunlight.  Don't worry if you forget to take your tablet. Do not take a double dose. The levels of the drug in your blood will not change very much, but try not to miss more than one or two tablets in a row.  If your doctor decides to stop the treatment, return any remaining tablets to the pharmacist. Don't flush them down the toilet or throw them away.  Remember to get a new prescription a few weeks before you run out of tablets, and make sure that you have plenty for holidays. References   This information is based on our Anastrozole (Arimidex®) fact sheet and has been compiled using information from a number of reliable sources, including:    leticia Crowe. Maxine Santoyo: The Complete Drug Reference. 36th edition. 2009. [employer]. [facility] Project [employer]. 59th edition. 2010.  [place] [employer] and [address].  Early and locally advanced breast cancer: diagnosis and treatment. February 2009. National institute for Health and Clinical Excellence (NICE).  electronic Medicines Compendium [facility]). www.medicines. org.uk (accessed September 2010).

## 2021-11-17 NOTE — PROGRESS NOTES
Chika Carlson is a 74 y.o. female  Chief Complaint   Patient presents with    Follow-up    Breast Cancer     1. Have you been to the ER, urgent care clinic since your last visit? Hospitalized since your last visit? Yes, patient states she had port removed by Chelsie Koroma at [facility]. 2. Have you seen or consulted any other health care providers outside of the [address] [place] [address] since your last visit? Include any pap smears or colon screening.  No.

## 2021-11-17 NOTE — PROGRESS NOTES
Cancer [place] at [place] [address] [address] Chika Camilo Deutscher: [phone]  F: [phone]    Reason for Visit:   Yosi Nino is a 74 y.o. female who is seen today in office for follow up of Right Breast Cancer     Treatment History:   · Breast biopsy  · Lumpectomy 4/21/21 at [facility]  · Adjuvant weekly Taxol/Herceptin x 12 weeks from 6/16/21 - 9/8/21  · Taxol DR to 60 mg/m2 on Day 8 Cycle 3 due to side effects  stopped 9/15/21. Radiation. For AI. STAGE:  · T2N0 ER+ HER2 +  · MYRISK negative    History of Present Illness: Yosi Nino is a 74 y.o. female seen today in office for follow up of right breast cancer ER+ HER2+ hx of lumpectomy. Off TH since 9/21. Stopped due to possible cardiac issues. Still seeing cardio   Saw breast surgery and doing well. Had a rash ? Contact dermatitis. Saw derm in [place] and got a cream.   Rash comes and goes. Has vaginal dryness and seeing GYN and to see Uro/GYN. No fevers/ chills/ chest pain/ SOB/ nausea/ vomiting/diarrhea/ pain/fatigue         Last visit:   on weekly Taxol/Herceptin since 6/16/21. She is here today for Day 15 Cycle 4 (Week 12). Taxol was DR to 60 mg/m2 with Day 8 Cycle 3 due to side effects. She reports that she feels well overall today. She is tolerating treatment well overall with some fatigue. Her appetite is good and energy levels are lower overall. She has mild tingling but very little and very mild, occasional nausea. She denies fever, chills, mouth sores, cough, SOB, CP, vomiting, diarrhea, constipation. CBC and CMP are still pending today. She is ready for treatment today, is using the Cold Cap during treatment. She is here alone today.     Past Medical History:   Diagnosis Date    Cancer [facility])     cervical and colon    Cervical cancer ([facility] [address] 1983    Colon cancer ([facility] [address] 2008      Past Surgical History:   Procedure Laterality Date    ENDOSCOPY, COLON, DIAGNOSTIC        Social History     Tobacco Use    Smoking status: Never Smoker    Smokeless tobacco: Never Used   Substance Use Topics    Alcohol use: Never      Family History   Problem Relation Age of Onset    Cancer Mother     Breast Cancer Mother     Cancer Father     Melanoma Father     Breast Cancer Sister     Colon Cancer Brother     Prostate Cancer Brother     Heart Disease Mother     Hypertension Sister      Current Outpatient Medications   Medication Sig    cholecalciferol (VITAMIN D3) (50,000 UNITS /1250 MCG) capsule Take 1 Capsule by mouth every seven (7) days.  metoprolol succinate (TOPROL-XL) 25 mg XL tablet Take 1 Tablet by mouth daily.  sertraline (ZOLOFT) 50 mg tablet TAKE ONE TABLET BY MOUTH EVERY DAY     No current facility-administered medications for this visit. Allergies   Allergen Reactions    Cipro [Ciprofloxacin Hcl] Nausea and Vomiting    Benadryl Allergy Decongestant Nausea and Vomiting    Ciprofloxacin Nausea and Vomiting      Review of Systems:  A complete review of systems was obtained, negative except as described above and as reported on ROS sheet scanned into system. Physical Exam:     Visit Vitals  /73 (BP 1 Location: Left upper arm, BP Patient Position: Sitting)   Pulse 71   Temp (!) 96 °F (35.6 °C) (Temporal)   Ht 5' 5\" (1.651 m)   Wt 150 lb 1.6 oz (68.1 kg)   SpO2 96%   BMI 24.98 kg/m²     ECOG PS: 0  General: No distress  Eyes: Anicteric sclerae  HENT: Atraumatic, wearing a mask  Neck: Supple  Respiratory: CTAB, normal respiratory effort  CV: Normal rate, regular rhythm, no murmurs, no peripheral edema  GI: Soft, nontender, nondistended, no masses  MS: Normal gait and station. Digits without clubbing or cyanosis. Skin: No rashes, ecchymoses, or petechiae. Normal temperature, turgor, and texture.    Psych: Alert, oriented, appropriate affect, normal judgment/insight  Neuro: Non focal  Breast no masses palpable b/l     Results:     Lab Results   Component Value Date/Time    WBC 4.0 09/15/2021 09:27 AM    HGB 12.5 09/15/2021 09:27 AM    HCT 37.2 09/15/2021 09:27 AM    PLATELET 959 02/78/7706 09:27 AM    MCV 96.9 09/15/2021 09:27 AM    ABS. NEUTROPHILS 2.4 09/15/2021 09:27 AM    HGB (POC) 14.3 09/20/2016 09:28 AM    HCT (POC) 43.1 09/20/2016 09:28 AM     Lab Results   Component Value Date/Time    Sodium 143 09/15/2021 09:27 AM    Potassium 4.0 09/15/2021 09:27 AM    Chloride 112 (H) 09/15/2021 09:27 AM    CO2 25 09/15/2021 09:27 AM    Glucose 85 09/15/2021 09:27 AM    BUN 15 09/15/2021 09:27 AM    Creatinine 0.80 09/15/2021 09:27 AM    GFR est AA >60 09/15/2021 09:27 AM    GFR est non-AA >60 09/15/2021 09:27 AM    Calcium 8.9 09/15/2021 09:27 AM     Lab Results   Component Value Date/Time    Bilirubin, total 0.4 09/15/2021 09:27 AM    ALT (SGPT) 23 09/15/2021 09:27 AM    Alk. phosphatase 85 09/15/2021 09:27 AM    Protein, total 6.6 09/15/2021 09:27 AM    Albumin 3.5 09/15/2021 09:27 AM    Globulin 3.1 09/15/2021 09:27 AM     All reports from outside facility scanned into media    08/23/21    ECHO ADULT COMPLETE 08/23/2021 8/23/2021    Interpretation Summary  · LV: Estimated LVEF is 55 - 60%. Normal cavity size, wall thickness and systolic function (ejection fraction normal). Normal left ventricular strain. Global longitudinal strain is -15.4%. Mild (grade 1) left ventricular diastolic dysfunction. Signed by: Valentina Bernal MD on 8/23/2021  1:06 PM    Records reviewed and summarized above. Pathology report(s) reviewed above. Radiology report(s) reviewed above. Assessment:/PLAN     1) Stage 2 RIGHT Breast Cancer ER+ Her2+ post Lumpectomy 4/21/21   previously seen by Dr. Collin Martinez with [facility]. Most interested in weekly Taxol/Herceptin. She had a pre chemo ECHO on 5/20/21 at [facility] that was good with EF 60-65%- scanned into media. She already has a port. She started weekly Taxol/Herceptin on 6/16/21. Seen today for fu. Stopped TH 9/21 due to possible cardiac issues. Following with cardio. Discussed no further TH today and no further TH treatment planned. No further Herceptin. Clinically pt doing well overall. Doing radiation now. Has a rash on/off and seeing derm. Possible contact dermatitis. To see derm here. For adjuvant AI. The risks and benefits of aromatase inhibitors (anastrozole, letrozole, and exemestane) were discussed in detail and the patient was informed of the following: Risks include the development of painful muscles and joints (arthralgia/myalgia) and bone loss. Muscle and joint pain can be severe but rarely result in any tissue damage; symptoms usually resolve in several weeks when the medication is stopped. Bone loss is common and a bone density test is recommended as a baseline and then yearly to every several years depending on initial results. The risk of fractures is increased by a few percent in patients taking these drugs, but careful monitoring of bone density and using bone protecting agents when indicated can minimize these risks. Unlike tamoxifen there is no increased risk of blood clots or endometrial cancer. AIs can cause or worsen vaginal dryness but women using these drugs should not use vaginal estrogen preparations for these symptoms. AIs can also cause or increase hot flashes. Any other symptoms should be reported. Pt will start anastrozole after radiation. Clinically pt doing well overall. Labs routine. 2) Hx of Colon Cancer (2008) and Cervical Cancer (9521)  She did not have chemo. On a course of surveillance. 3) rash/ contact dermatitis. Seeing derm. 4) vaginal dryness. Seeing GYN. 5) low EF on therapy. Seeing cardio. 6) Psychosocial  Mood good. Coping well. She has good family support. SW/NN support as needed. Has supportive . Vaccinated with J+J and to get booster. Call if questions. Follow up in 1 month    I appreciate the opportunity to participate in Ms. Mila Stacy's care.     Signed By: Joaquín Daugherty Major Aw, DO

## 2021-11-18 ENCOUNTER — HOSPITAL ENCOUNTER (OUTPATIENT)
Dept: RADIATION THERAPY | Age: 66
Discharge: HOME | End: 2021-11-18

## 2021-11-18 ENCOUNTER — TELEPHONE (OUTPATIENT)
Dept: CARDIOLOGY CLINIC | Age: 66
End: 2021-11-18

## 2021-11-18 NOTE — TELEPHONE ENCOUNTER
Verified patient with two types of identifiers. Let her know Dr. Osiris Buitrago office should be in contact tomorrow for a derm appointment. Patient verbalized understanding and appreciation.

## 2021-11-18 NOTE — TELEPHONE ENCOUNTER
Faxed derm referral to [phone]. Called patient and gave her the name and number for [facility].

## 2021-11-19 ENCOUNTER — HOSPITAL ENCOUNTER (OUTPATIENT)
Dept: RADIATION THERAPY | Age: 66
Discharge: HOME | End: 2021-11-19

## 2021-11-22 ENCOUNTER — HOSPITAL ENCOUNTER (OUTPATIENT)
Dept: RADIATION THERAPY | Age: 66
Discharge: HOME | End: 2021-11-22

## 2021-11-23 ENCOUNTER — HOSPITAL ENCOUNTER (OUTPATIENT)
Dept: RADIATION THERAPY | Age: 66
Discharge: HOME | End: 2021-11-23

## 2021-11-24 ENCOUNTER — APPOINTMENT (OUTPATIENT)
Dept: INFUSION THERAPY | Age: 66
End: 2021-11-24

## 2021-11-24 ENCOUNTER — HOSPITAL ENCOUNTER (OUTPATIENT)
Dept: RADIATION THERAPY | Age: 66
Discharge: HOME | End: 2021-11-24

## 2021-11-29 ENCOUNTER — HOSPITAL ENCOUNTER (OUTPATIENT)
Dept: RADIATION THERAPY | Age: 66
Discharge: HOME | End: 2021-11-29

## 2021-11-30 ENCOUNTER — HOSPITAL ENCOUNTER (OUTPATIENT)
Dept: RADIATION THERAPY | Age: 66
Discharge: HOME | End: 2021-11-30

## 2021-12-01 ENCOUNTER — TELEPHONE (OUTPATIENT)
Dept: PALLATIVE CARE | Age: 66
End: 2021-12-01

## 2021-12-01 NOTE — TELEPHONE ENCOUNTER
Called patient to advise/confirm upcoming appt with Dr. Patrick Huggins on 12/7/2021 at 10:00am.  No answer so left voicemail. Also advised to please bring in your photo ID with you to appointment.

## 2021-12-03 ENCOUNTER — OFFICE VISIT (OUTPATIENT)
Dept: CARDIOLOGY CLINIC | Age: 66
End: 2021-12-03
Payer: MEDICARE

## 2021-12-03 ENCOUNTER — ANCILLARY PROCEDURE (OUTPATIENT)
Dept: CARDIOLOGY CLINIC | Age: 66
End: 2021-12-03
Payer: MEDICARE

## 2021-12-03 VITALS
HEART RATE: 64 BPM | HEIGHT: 65 IN | DIASTOLIC BLOOD PRESSURE: 60 MMHG | BODY MASS INDEX: 24.83 KG/M2 | WEIGHT: 149 LBS | RESPIRATION RATE: 16 BRPM | OXYGEN SATURATION: 96 % | SYSTOLIC BLOOD PRESSURE: 100 MMHG

## 2021-12-03 VITALS
WEIGHT: 151 LBS | SYSTOLIC BLOOD PRESSURE: 118 MMHG | HEIGHT: 65 IN | DIASTOLIC BLOOD PRESSURE: 60 MMHG | BODY MASS INDEX: 25.16 KG/M2

## 2021-12-03 DIAGNOSIS — Z17.0 MALIGNANT NEOPLASM OF OVERLAPPING SITES OF RIGHT BREAST IN FEMALE, ESTROGEN RECEPTOR POSITIVE (HCC): ICD-10-CM

## 2021-12-03 DIAGNOSIS — Z51.81 ENCOUNTER FOR MONITORING CARDIOTOXIC DRUG THERAPY: Primary | ICD-10-CM

## 2021-12-03 DIAGNOSIS — C50.811 MALIGNANT NEOPLASM OF OVERLAPPING SITES OF RIGHT BREAST IN FEMALE, ESTROGEN RECEPTOR POSITIVE (HCC): ICD-10-CM

## 2021-12-03 DIAGNOSIS — T88.7XXA MEDICATION SIDE EFFECT: ICD-10-CM

## 2021-12-03 DIAGNOSIS — R06.09 DOE (DYSPNEA ON EXERTION): ICD-10-CM

## 2021-12-03 DIAGNOSIS — Z79.899 ENCOUNTER FOR MONITORING CARDIOTOXIC DRUG THERAPY: ICD-10-CM

## 2021-12-03 DIAGNOSIS — Z51.81 ENCOUNTER FOR MONITORING CARDIOTOXIC DRUG THERAPY: ICD-10-CM

## 2021-12-03 DIAGNOSIS — Z79.899 ENCOUNTER FOR MONITORING CARDIOTOXIC DRUG THERAPY: Primary | ICD-10-CM

## 2021-12-03 PROCEDURE — G9899 SCRN MAM PERF RSLTS DOC: HCPCS | Performed by: SPECIALIST

## 2021-12-03 PROCEDURE — G9711 PT HX TOT COL OR COLON CA: HCPCS | Performed by: SPECIALIST

## 2021-12-03 PROCEDURE — G8420 CALC BMI NORM PARAMETERS: HCPCS | Performed by: SPECIALIST

## 2021-12-03 PROCEDURE — G0463 HOSPITAL OUTPT CLINIC VISIT: HCPCS | Performed by: SPECIALIST

## 2021-12-03 PROCEDURE — 99214 OFFICE O/P EST MOD 30 MIN: CPT | Performed by: SPECIALIST

## 2021-12-03 PROCEDURE — 1090F PRES/ABSN URINE INCON ASSESS: CPT | Performed by: SPECIALIST

## 2021-12-03 PROCEDURE — 1101F PT FALLS ASSESS-DOCD LE1/YR: CPT | Performed by: SPECIALIST

## 2021-12-03 PROCEDURE — 93308 TTE F-UP OR LMTD: CPT | Performed by: SPECIALIST

## 2021-12-03 PROCEDURE — G8400 PT W/DXA NO RESULTS DOC: HCPCS | Performed by: SPECIALIST

## 2021-12-03 PROCEDURE — G8510 SCR DEP NEG, NO PLAN REQD: HCPCS | Performed by: SPECIALIST

## 2021-12-03 PROCEDURE — G8427 DOCREV CUR MEDS BY ELIG CLIN: HCPCS | Performed by: SPECIALIST

## 2021-12-03 PROCEDURE — G8536 NO DOC ELDER MAL SCRN: HCPCS | Performed by: SPECIALIST

## 2021-12-03 RX ORDER — MOMETASONE FUROATE 1 MG/G
CREAM TOPICAL
COMMUNITY
Start: 2021-10-14

## 2021-12-03 RX ORDER — TOBRAMYCIN/DEXAMETHASONE 0.3 %-0.1%
OINTMENT (GRAM) OPHTHALMIC (EYE)
COMMUNITY
Start: 2021-10-04

## 2021-12-03 NOTE — PROGRESS NOTES
Zeeshan Preston     1955       Heaven Steward MD, [facility]  Date of Visit-12/3/2021   PCP is John Cristina MD   [facility]  [facility]  HPI:  Zeeshan Preston is a 74 y.o. female   2 month f/u. · Echo on 8/23/21 showed a normal EF. The global strain was normal at -15. · Pt with previous stress echo in 2013 that was normal.   ·  on herceptin therapy. ·  cervical cancer in 1983, colon cancer in 2008, now breast cancer. · She had a lobectomy on April 1st, 2021. · Visit 9/7, previously had an echo with chemo that showed reduction in the GLS. We repeated her echo and there was a further reduction in the EF. We have decided to start carvedilol around the 10th of September, the EF was 52%. She had some itching around the eyelids. Today. .. Says her rash \"comes and goes\" eye dermatitis saw Dr Corrine Triana echo today shows EF 59%. Pt is finishing up her XRT, saw Dr. Joyce Hendrix yesterday. She is seeing Dermatology. Last visit with Oncology 11/17/21 is reviewed. Pt states her radiation is finished next Tuesday. Overall the pt states she is doing well. She reports some tiredness but attributes that to getting up early and driving to [place] every day. Denies chest pain, edema, syncope or shortness of breath at rest, has no tachycardia, palpitations or sense of arrhythmia. Assessment/Plan:     Patient Instructions   We will see you back in 3 months for a limited echocardiogram. We will send results via [employer] and see you back in 6 months with another limited echocardiogram and office visit. 1. Encounter for monitoring cardiotoxic drug therapy  Echo today shows improvement in GLS and EF. Continue current meds, asymptomatic, no CHF sx's. Will f/u in three months with limited and 6 months with visit and echo. She is feeling well, has been off herceptin since September and no plans to restart.    12/03/21 ECHO ADULT FOLLOW-UP OR LIMITED Interpretation Summary  · LV: Calculated LVEF is 59%. Biplane method used to measure ejection fraction. Normal cavity size, wall thickness and systolic function (ejection fraction normal). Wall motion: normal. Abnormal left ventricular strain. Global longitudinal strain is -16.9%. Mild (grade 1) left ventricular diastolic dysfunction. · GLS: Today = -16.9%, 10/08/2021 = -16.0%, 9/10/2021 = -16.3%, 8/23/2021 = -15.4%        2. Malignant neoplasm of overlapping sites of right breast in female, estrogen receptor positive ([facility] [address]  Has finished XRT and had Port Removal.     3. Medication side effect  Her rash has resolved. Continues on low dose Toprol, seeing dermo. 4. VIVAR (dyspnea on exertion)  Asymptomatic, resolved. F/u in 6 months, echo in 3 months     Impression:   1. Encounter for monitoring cardiotoxic drug therapy    2. Malignant neoplasm of overlapping sites of right breast in female, estrogen receptor positive ([facility] [address]    3. Medication side effect    4. VIVAR (dyspnea on exertion)       Cardiac History:   No specialty comments available. Future Appointments   Date Time Provider Joseph Jacinta   12/7/2021 10:00 AM Tre Foy MD [facility] BS AMB   1/11/2022 12:00 PM Tre Foy MD [facility] BS AMB        ROS-except as noted above. . A complete cardiac and respiratory are reviewed and negative except as above ; Resp-denies wheezing  or productive cough,. Const- No unusual weight loss or fever; Neuro-no recent seizure or CVA ; GI- No BRBPR, abdom pain, bloating ; - no  hematuria   see supplement sheet, initialed and to be scanned by staff  Past Medical History:   Diagnosis Date    Cancer ([facility] [address]     cervical and colon    Cervical cancer ([facility] [address] 1983    Colon cancer ([facility] [address] 2008      Social Hx= reports that she has never smoked. She has never used smokeless tobacco. She reports that she does not drink alcohol and does not use drugs.      Exam and Labs:  /60   Pulse 64   Resp 16   Ht 5' 5\" (1.651 m)   Wt 149 lb (67.6 kg)   SpO2 96%   BMI 24.79 kg/m² Constitutional:  NAD, comfortable  Head: NC,AT. Eyes: No scleral icterus. Neck:  Neck supple. No JVD present. Throat: moist mucous membranes. Chest: Effort normal & normal respiratory excursion . Neurological: alert, conversant and oriented . Skin: Skin is not cold. No obvious systemic rash noted. Not diaphoretic. No erythema. Psychiatric:  Grossly normal mood and affect. Behavior appears normal. Extremities:  no clubbing or cyanosis. Abdomen: non distended    Lungs:breath sounds normal. No stridor. distress, wheezes or  Rales. Heart: normal rate, regular rhythm, normal S1, S2, no murmurs, rubs, clicks or gallops , PMI non displaced. Edema: Edema is none. Lab Results   Component Value Date/Time    Cholesterol, total 271 (H) 12/02/2021 10:01 AM    HDL Cholesterol 47 12/02/2021 10:01 AM    LDL, calculated 197 (H) 12/02/2021 10:01 AM    Triglyceride 146 12/02/2021 10:01 AM     Lab Results   Component Value Date/Time    Sodium 142 12/02/2021 10:01 AM    Potassium 4.8 12/02/2021 10:01 AM    Chloride 105 12/02/2021 10:01 AM    CO2 26 12/02/2021 10:01 AM    Anion gap 6 09/15/2021 09:27 AM    Glucose 95 12/02/2021 10:01 AM    BUN 15 12/02/2021 10:01 AM    Creatinine 0.90 12/02/2021 10:01 AM    BUN/Creatinine ratio 17 12/02/2021 10:01 AM    GFR est AA 76 12/02/2021 10:01 AM    GFR est non-AA 66 12/02/2021 10:01 AM    Calcium 9.6 12/02/2021 10:01 AM      Wt Readings from Last 3 Encounters:   12/03/21 149 lb (67.6 kg)   12/03/21 151 lb (68.5 kg)   12/02/21 151 lb (68.5 kg)      BP Readings from Last 3 Encounters:   12/03/21 100/60   12/03/21 118/60   12/02/21 118/60      Current Outpatient Medications   Medication Sig    Tobradex ophthalmic ointment     mometasone (ELOCON) 0.1 % topical cream     cholecalciferol (VITAMIN D3) (50,000 UNITS /1250 MCG) capsule Take 1 Capsule by mouth every seven (7) days.     metoprolol succinate (TOPROL-XL) 25 mg XL tablet Take 1 Tablet by mouth daily.  sertraline (ZOLOFT) 50 mg tablet TAKE ONE TABLET BY MOUTH EVERY DAY     No current facility-administered medications for this visit. Impression see above.       Written by Lima Hoover, as dictated by Sakina Feldman MD.

## 2021-12-03 NOTE — Clinical Note
12/3/2021    Patient: Judith Lopez   YOB: 1955   Date of Visit: 12/3/2021     Maria Isabel Castaneda MD  [address] [facility]    Dear Maria Isabel Castaneda MD,      Thank you for referring Ms. French Lopez to [facility] for evaluation. My notes for this consultation are attached. If you have questions, please do not hesitate to call me. I look forward to following your patient along with you.       Sincerely,    Coretta Shultz MD

## 2021-12-03 NOTE — PATIENT INSTRUCTIONS
We will see you back in 3 months for a limited echocardiogram. We will send results via [employer] and see you back in 6 months with another limited echocardiogram and office visit.

## 2021-12-07 ENCOUNTER — OFFICE VISIT (OUTPATIENT)
Dept: PALLATIVE CARE | Age: 66
End: 2021-12-07

## 2021-12-07 DIAGNOSIS — L29.9 PRURITUS: Primary | ICD-10-CM

## 2021-12-07 DIAGNOSIS — Z79.899 ENCOUNTER FOR MONITORING CARDIOTOXIC DRUG THERAPY: Primary | ICD-10-CM

## 2021-12-07 DIAGNOSIS — Z51.81 ENCOUNTER FOR MONITORING CARDIOTOXIC DRUG THERAPY: Primary | ICD-10-CM

## 2021-12-07 PROCEDURE — 97813 ACUP 1/> W/ESTIM 1ST 15 MIN: CPT | Performed by: PHYSICAL MEDICINE & REHABILITATION

## 2021-12-07 RX ORDER — METOPROLOL SUCCINATE 25 MG/1
25 TABLET, EXTENDED RELEASE ORAL DAILY
Qty: 90 TABLET | Refills: 3 | Status: SHIPPED | OUTPATIENT
Start: 2021-12-07

## 2021-12-07 NOTE — PROGRESS NOTES
Palliative Medicine and Integrative Medicine Acupuncture Note    Date Current Visit: 12/7/2021 VISIT 5  Date Initial Visit: 8/17/21  Requesting Physician: Nigel De La Cruz    Summary: Anxiety about cancer tx    Subjective: Xenia Dorantes is a 74 y.o. female w/ a hx of cervical cancer in 1983, colon cancer in 2008 and R breast cancer s/p lumpectomy 4/2021 at [facility] and receiving adjuvant chemo w/ Dr Nigel De La Cruz- Taxol/Herceptin. With her other cancer dx, did not require chemo or radiation- so more anxious about the treatment than the actual cancer dx. Tolerating tx well- very mild nausea, no neuropathy, and keeping her energy up w/ good diet and regular exercise. At times at night feels like her \"whole body has restless leg syndrome\"     Has very supportive family including a niece in the area who had breast cancer in 2019 and went to acupuncturist Catalino Vallejo. She is seeing Kyler Garcia for meditation from the [facility] and a Reiki master. Social: From [facility], went to school at [employer]. Supportive . Has a dtr and son- one in [place] [id number] and the other in [place]. One 5 siblings- cancer runs in their family but so does longevity. INTERVAL HX  ~~~~~~~~~~~    12/7/21- Completed radiation, to start anastrazole soon. Has itching and tightness of R breast, some discomfort when itches. Also has itching all over body at times- takes Claritin daily, has seen dermatologist. Keily Beebe overwhelmed w/ all the medications she has to take compared to before her CA dx.     11/16/21- Last session had itchy eyelids which she attributed to [address]. Now on metoprolol, since then has had slightly pruritic rash on legs and chest- saw Derm while in [place] (was there to support her dtr going through surgery) and had a topical steroid prescribed. Still present. Energy fair. 10/5/21- Pt had port taken out and started on Coreg by Cardiology. However soon after both events has had significant pruritis of b/l eye lids w/ swelling and tearing. Dr Lubna Walker stopped the Coreg for 7-10 days. Pt was her ophthalmologist at [facility] yesterday, started on an abx ointment. Also feeling incr stressors, worries and feeling \"frazzled\"- for example, forgot her wallet when getting gas. 9/21/21- Pt w/ mult stressors, feels like she is always going to the doctors. Recent echo w/ Dr Lubna Walker w/ EF 52% (down from 60%)- Herceptin on hold. Was worried, as received Herceptin while repeat echo was pending. Some fatigue and diarrhea. Functional status remains good- rides bike, does sit ups and weights for arms daily.         Past Medical History:   Past Medical History:   Diagnosis Date    Cancer ([facility] [address]     cervical and colon    Cervical cancer ([facility] [address] 1983    Colon cancer ([facility] [address] 2008        Past Surgical history:   Past Surgical History:   Procedure Laterality Date    ENDOSCOPY, COLON, DIAGNOSTIC          Family History:. Family History   Problem Relation Age of Onset    Cancer Mother     Breast Cancer Mother     Cancer Father     Melanoma Father     Breast Cancer Sister     Colon Cancer Brother     Prostate Cancer Brother     Heart Disease Mother     Hypertension Sister          ROS:   ROS    The following systems were reviewed: constitutional, ears/nose/mouth/throat, respiratory, gastrointestinal, musculoskeletal, neurologic, psychiatric, endocrine. Positive findings noted below.     Per above     Social history:   Social History     Socioeconomic History    Marital status:      Spouse name: Not on file    Number of children: Not on file    Years of education: Not on file    Highest education level: Not on file   Occupational History    Not on file   Tobacco Use    Smoking status: Never Smoker    Smokeless tobacco: Never Used   Vaping Use    Vaping Use: Never used   Substance and Sexual Activity    Alcohol use: Never    Drug use: Never    Sexual activity: Never   Other Topics Concern    Not on file   Social History Narrative    ** Merged History Encounter **          Social Determinants of Health     Financial Resource Strain:     Difficulty of Paying Living Expenses: Not on file   Food Insecurity:     Worried About Running Out of Food in the Last Year: Not on file    Yann of Food in the Last Year: Not on file   Transportation Needs:     Lack of Transportation (Medical): Not on file    Lack of Transportation (Non-Medical): Not on file   Physical Activity:     Days of Exercise per Week: Not on file    Minutes of Exercise per Session: Not on file   Stress:     Feeling of Stress : Not on file   Social Connections: Unknown    Frequency of Communication with Friends and Family: More than three times a week    Frequency of Social Gatherings with Friends and Family: Not on file    Attends Jew Services: Not on file   [employer] Group of Clubs or Organizations: Yes    Attends Club or Organization Meetings: More than 4 times per year    Marital Status:    Intimate Partner Violence:     Fear of Current or Ex-Partner: Not on file    Emotionally Abused: Not on file    Physically Abused: Not on file    Sexually Abused: Not on file   Housing Stability:     Unable to Pay for Housing in the Last Year: Not on file    Number of [place] in the Last Year: Not on file    Unstable Housing in the Last Year: Not on file        Physical Exam:     Labs reviewed from 9/15/21    Objective:     General appearance:  No apparent distress, well nourished, well groomed   HEENT: nl sclera, nares clear, moist mucous membranes   Lungs: Unlabored   Skin: Warm, dry, erythema over chest, no visible rash    Psych:  Normal affect, appropriate   Msk Gait strong and balanced, grossly intact                Assessment and Plan:       ICD-10-CM ICD-9-CM    1.  Pruritus  L29.9 698.9         Treatment Plan:   -Goals: Improved pain, function   -Type of acupuncture and number of treatments planned   -Other modalities or referrals       Acupuncture treatment performed after written and verbal consent obtained. Patient aware that risk include, but are not limited to: pain during needle insertion; bleeding/bruising; infections; internal organ perforation. All questions were answered. Time out performed immediately prior to the start of the procedure. Patient identified by name and date of birth and agreement on procedure being performed was verified. ~~~~~~~ ~~~~~~~~~~~~~~~~~~~~    *Tolerates needling well  *Uses donut hole of table  *Likes fan   *After first tx felt \"lighter\"    1. General well being and anxiety about health while undergoing chemotherapy: Supportive family, coping well and minimal side effects from chemo. Right breast itchy from radiation. *Lie on L side facing wall     -GV20  -GV16, GB20, BL10, SRT x 10 b/l traps  -Inner bladder line T3, T4, T5, T6 b/l and connect R side to 4 Hz x 18 minutes       ~~~~~~~~~~~~~~~~~~~~~~~~~~~    Pt tolerated procedure well without apparent complications. Pt advised to avoid strenuous sexual activity, exercise, heavy meals, alcohol for the next 24 hours. Aware that rebound effect may occur following treatment but that should improve back to baseline in several days.

## 2021-12-07 NOTE — PROGRESS NOTES
Per VO by MD.     Future Appointments   Date Time Provider Joseph Workman   12/7/2021 10:00 AM MD ANNABELLE Kimball BS AMB   1/11/2022 12:00 PM MD ANNABELLE Kimball BS AMB   3/11/2022  9:00 AM JOHN SOL BS AMB   6/6/2022  9:00 AM JOHN SOL BS AMB   6/6/2022  9:40 AM Heaven Parker MD CAVREY BS AMB

## 2021-12-08 ENCOUNTER — APPOINTMENT (OUTPATIENT)
Dept: INFUSION THERAPY | Age: 66
End: 2021-12-08

## 2021-12-08 DIAGNOSIS — Z17.0 MALIGNANT NEOPLASM OF OVERLAPPING SITES OF RIGHT BREAST IN FEMALE, ESTROGEN RECEPTOR POSITIVE (HCC): Primary | ICD-10-CM

## 2021-12-08 DIAGNOSIS — C50.811 MALIGNANT NEOPLASM OF OVERLAPPING SITES OF RIGHT BREAST IN FEMALE, ESTROGEN RECEPTOR POSITIVE (HCC): Primary | ICD-10-CM

## 2021-12-08 DIAGNOSIS — Z79.811 USE OF ANASTROZOLE (ARIMIDEX): ICD-10-CM

## 2021-12-08 RX ORDER — ANASTROZOLE 1 MG/1
1 TABLET ORAL DAILY
Qty: 90 TABLET | Refills: 3 | Status: SHIPPED | OUTPATIENT
Start: 2021-12-08

## 2021-12-10 ENCOUNTER — PATIENT MESSAGE (OUTPATIENT)
Dept: PALLATIVE CARE | Age: 66
End: 2021-12-10

## 2021-12-10 ENCOUNTER — TELEPHONE (OUTPATIENT)
Dept: PALLATIVE CARE | Age: 66
End: 2021-12-10

## 2021-12-10 NOTE — TELEPHONE ENCOUNTER
Calling patient to advise that we had to cancel her appointment on January 11, 2022 due to provider meeting. Appt has been rescheduled to February 8 at 1:30pm.  No answer so left voicemail and will send a my chart message.

## 2021-12-13 ENCOUNTER — TELEPHONE (OUTPATIENT)
Dept: ONCOLOGY | Age: 66
End: 2021-12-13

## 2021-12-15 LAB
ECHO LV E' LATERAL VELOCITY: 9.67 CM/S
ECHO LV E' SEPTAL VELOCITY: 6.82 CM/S
ECHO LV EDV A2C: 91.39 ML
ECHO LV EDV A4C: 84.85 ML
ECHO LV EDV BP: 89.94 ML (ref 56–104)
ECHO LV EDV INDEX A4C: 48 ML/M2
ECHO LV EDV INDEX BP: 51 ML/M2
ECHO LV EDV NDEX A2C: 52 ML/M2
ECHO LV EJECTION FRACTION A2C: 66 PERCENT
ECHO LV EJECTION FRACTION A4C: 54 PERCENT
ECHO LV EJECTION FRACTION BIPLANE: 59.4 PERCENT (ref 55–100)
ECHO LV ESV A2C: 31.1 ML
ECHO LV ESV A4C: 39.27 ML
ECHO LV ESV BP: 36.51 ML (ref 19–49)
ECHO LV ESV INDEX A2C: 18 ML/M2
ECHO LV ESV INDEX A4C: 22 ML/M2
ECHO LV ESV INDEX BP: 21 ML/M2
ECHO LV FRACTIONAL SHORTENING: 32 % (ref 28–44)
ECHO LV GLOBAL LONGITUDINAL STRAIN (GLS): -16.9 PERCENT
ECHO LV INTERNAL DIMENSION DIASTOLE INDEX: 2.73 CM/M2
ECHO LV INTERNAL DIMENSION DIASTOLIC: 4.77 CM (ref 3.9–5.3)
ECHO LV INTERNAL DIMENSION SYSTOLIC INDEX: 1.85 CM/M2
ECHO LV INTERNAL DIMENSION SYSTOLIC: 3.23 CM
ECHO LV IVSD: 0.73 CM (ref 0.6–0.9)
ECHO LV MASS 2D: 119.3 G (ref 67–162)
ECHO LV MASS INDEX 2D: 68.2 G/M2 (ref 43–95)
ECHO LV POSTERIOR WALL DIASTOLIC: 0.81 CM (ref 0.6–0.9)
ECHO LV RELATIVE WALL THICKNESS RATIO: 0.34
ECHO MV A VELOCITY: 74.31 CM/S
ECHO MV AREA PHT: 4 CM2
ECHO MV E DECELERATION TIME (DT): 188.33 MS
ECHO MV E VELOCITY: 50.55 CM/S
ECHO MV PRESSURE HALF TIME (PHT): 54.62 MS
GLOBAL LONGITUDINAL STRAIN 2 CHAMBER: -19.4 PERCENT
GLOBAL LONGITUDINAL STRAIN 4 CHAMBER: -14.8 PERCENT
GLOBAL LONGITUDINAL STRAIN LONG AXIS: -16.4 PERCENT

## 2021-12-15 PROCEDURE — 93308 TTE F-UP OR LMTD: CPT | Performed by: SPECIALIST

## 2021-12-15 PROCEDURE — 93325 DOPPLER ECHO COLOR FLOW MAPG: CPT | Performed by: SPECIALIST

## 2021-12-20 NOTE — PROGRESS NOTES
Has been seeing [address], Dr. Kam Hung.  Next visit is scheduled 06/06/2022.

## 2021-12-29 ENCOUNTER — APPOINTMENT (OUTPATIENT)
Dept: INFUSION THERAPY | Age: 66
End: 2021-12-29

## 2022-01-10 DIAGNOSIS — Z17.0 MALIGNANT NEOPLASM OF OVERLAPPING SITES OF RIGHT BREAST IN FEMALE, ESTROGEN RECEPTOR POSITIVE (HCC): ICD-10-CM

## 2022-01-10 DIAGNOSIS — C50.811 MALIGNANT NEOPLASM OF OVERLAPPING SITES OF RIGHT BREAST IN FEMALE, ESTROGEN RECEPTOR POSITIVE (HCC): ICD-10-CM

## 2022-01-10 DIAGNOSIS — E55.9 VITAMIN D DEFICIENCY: ICD-10-CM

## 2022-01-10 RX ORDER — ASPIRIN 325 MG
50000 TABLET, DELAYED RELEASE (ENTERIC COATED) ORAL
Qty: 4 CAPSULE | Refills: 3 | Status: SHIPPED | OUTPATIENT
Start: 2022-01-10 | End: 2022-04-18

## 2022-01-12 DIAGNOSIS — Z98.890 HISTORY OF LUMPECTOMY OF RIGHT BREAST: ICD-10-CM

## 2022-01-12 DIAGNOSIS — C50.811 MALIGNANT NEOPLASM OF OVERLAPPING SITES OF RIGHT BREAST IN FEMALE, ESTROGEN RECEPTOR POSITIVE (HCC): Primary | ICD-10-CM

## 2022-01-12 DIAGNOSIS — Z17.0 MALIGNANT NEOPLASM OF OVERLAPPING SITES OF RIGHT BREAST IN FEMALE, ESTROGEN RECEPTOR POSITIVE (HCC): Primary | ICD-10-CM

## 2022-01-28 DIAGNOSIS — C50.811 MALIGNANT NEOPLASM OF OVERLAPPING SITES OF RIGHT BREAST IN FEMALE, ESTROGEN RECEPTOR POSITIVE (HCC): Primary | ICD-10-CM

## 2022-01-28 DIAGNOSIS — Z85.038 HISTORY OF COLON CANCER: ICD-10-CM

## 2022-01-28 DIAGNOSIS — Z85.41 HISTORY OF CERVICAL CANCER: ICD-10-CM

## 2022-01-28 DIAGNOSIS — Z17.0 MALIGNANT NEOPLASM OF OVERLAPPING SITES OF RIGHT BREAST IN FEMALE, ESTROGEN RECEPTOR POSITIVE (HCC): Primary | ICD-10-CM

## 2022-02-02 ENCOUNTER — DOCUMENTATION ONLY (OUTPATIENT)
Dept: ONCOLOGY | Age: 67
End: 2022-02-02

## 2022-02-02 NOTE — PROGRESS NOTES
UPDATE:    Patient is now scheduled to see Sonya See with Physical Therapy on February 22nd, 2022 at 2:45PM!  Physical Therapy referral is now CLOSED.   Gloria Pollock

## 2022-02-08 ENCOUNTER — OFFICE VISIT (OUTPATIENT)
Dept: PALLATIVE CARE | Age: 67
End: 2022-02-08

## 2022-02-08 DIAGNOSIS — F41.8 ANXIETY ABOUT HEALTH: Primary | ICD-10-CM

## 2022-02-08 PROCEDURE — 97810 ACUP 1/> WO ESTIM 1ST 15 MIN: CPT | Performed by: PHYSICAL MEDICINE & REHABILITATION

## 2022-02-08 NOTE — PROGRESS NOTES
Palliative Medicine and Integrative Medicine Acupuncture Note    Date Current Visit: 2/8/2022 VISIT 6  Date Initial Visit: 8/17/21  Requesting Physician: Vance Menon    Summary: Anxiety about cancer tx    Subjective: Rowdy Gray is a 74 y.o. female w/ a hx of cervical cancer in 1983, colon cancer in 2008 and R breast cancer s/p lumpectomy 4/2021 at [facility] and receiving adjuvant chemo w/ Dr Vance Menon- Taxol/Herceptin. With her other cancer dx, did not require chemo or radiation- so more anxious about the treatment than the actual cancer dx. Tolerating tx well- very mild nausea, no neuropathy, and keeping her energy up w/ good diet and regular exercise. At times at night feels like her \"whole body has restless leg syndrome\"     Has very supportive family including a niece in the area who had breast cancer in 2019 and went to acupuncturist Amarilis Nicholson. She is seeing Dorina Santacruz for meditation from the [facility] and a Reiki master. Social: From [facility], went to school at [employer]. Supportive . Has a dtr and son- one in [address] and the other in [address] [facility] [place]. One 5 siblings- cancer runs in their family but so does longevity. INTERVAL HX  ~~~~~~~~~~~  2/8/22- Cont to have issues w/ pruritic rash on chest wall and back as well as blepharitis but just finishing up ointment from ophthalmologist. Utilizing all services that [facility] has to offer in order to focus on the future and being a survivor. Also notices diarrhea intermittently , to bring up w/ dietician. 12/7/21- Completed radiation, to start anastrazole soon. Has itching and tightness of R breast, some discomfort when itches. Also has itching all over body at times- takes Claritin daily, has seen dermatologist. Avel Saldaña overwhelmed w/ all the medications she has to take compared to before her CA dx.     11/16/21- Last session had itchy eyelids which she attributed to [address].  Now on metoprolol, since then has had slightly pruritic rash on legs and chest- saw Derm while in [place] (was there to support her dtr going through surgery) and had a topical steroid prescribed. Still present. Energy fair. 10/5/21- Pt had port taken out and started on Coreg by Cardiology. However soon after both events has had significant pruritis of b/l eye lids w/ swelling and tearing. Dr Willie Hurtado stopped the Coreg for 7-10 days. Pt was her ophthalmologist at [facility] yesterday, started on an abx ointment. Also feeling incr stressors, worries and feeling \"frazzled\"- for example, forgot her wallet when getting gas. 9/21/21- Pt w/ mult stressors, feels like she is always going to the doctors. Recent echo w/ Dr Willie Hurtado w/ EF 52% (down from 60%)- Herceptin on hold. Was worried, as received Herceptin while repeat echo was pending. Some fatigue and diarrhea. Functional status remains good- rides bike, does sit ups and weights for arms daily.         Past Medical History:   Past Medical History:   Diagnosis Date    Cancer ([address]     cervical and colon    Cervical cancer ([address] 1983    Colon cancer ([address] 2008        Past Surgical history:   Past Surgical History:   Procedure Laterality Date    ENDOSCOPY, COLON, DIAGNOSTIC          Family History:. Family History   Problem Relation Age of Onset    Cancer Mother     Breast Cancer Mother     Cancer Father     Melanoma Father     Breast Cancer Sister     Colon Cancer Brother     Prostate Cancer Brother     Heart Disease Mother     Hypertension Sister          ROS:   ROS    The following systems were reviewed: constitutional, ears/nose/mouth/throat, respiratory, gastrointestinal, musculoskeletal, neurologic, psychiatric, endocrine. Positive findings noted below.     Per above     Social history:   Social History     Socioeconomic History    Marital status:      Spouse name: Not on file    Number of children: Not on file    Years of education: Not on file    Highest education level: Not on file   Occupational History    Not on file   Tobacco Use    Smoking status: Never Smoker    Smokeless tobacco: Never Used   Vaping Use    Vaping Use: Never used   Substance and Sexual Activity    Alcohol use: Never    Drug use: Never    Sexual activity: Never   Other Topics Concern    Not on file   Social History Narrative    ** Merged History Encounter **          Social Determinants of Health     Financial Resource Strain:     Difficulty of Paying Living Expenses: Not on file   Food Insecurity:     Worried About Running Out of Food in the Last Year: Not on file    Yann of Food in the Last Year: Not on file   Transportation Needs:     Lack of Transportation (Medical): Not on file    Lack of Transportation (Non-Medical): Not on file   Physical Activity:     Days of Exercise per Week: Not on file    Minutes of Exercise per Session: Not on file   Stress:     Feeling of Stress : Not on file   Social Connections: Unknown    Frequency of Communication with Friends and Family: More than three times a week    Frequency of Social Gatherings with Friends and Family: Not on file    Attends Bahai Services: Not on file   [employer] Group of Clubs or Organizations:  Yes    Attends Club or Organization Meetings: More than 4 times per year    Marital Status:    Intimate Partner Violence:     Fear of Current or Ex-Partner: Not on file    Emotionally Abused: Not on file    Physically Abused: Not on file    Sexually Abused: Not on file   Housing Stability:     Unable to Pay for Housing in the Last Year: Not on file    Number of [place] in the Last Year: Not on file    Unstable Housing in the Last Year: Not on file        Physical Exam:     Labs reviewed from 9/15/21    Objective:     General appearance:  No apparent distress, well nourished, well groomed   HEENT: nl sclera, nares clear, eye lids w/out inflammation, moist mucous membranes   Lungs: Unlabored   Skin: Warm, dry, erythema over chest   Psych:  Normal affect, appropriate   Msk Gait strong and balanced, grossly intact                Assessment and Plan:       ICD-10-CM ICD-9-CM    1. Anxiety about health  F41.8 300.09         Treatment Plan:   -Goals: Improved pain, function   -Type of acupuncture and number of treatments planned   -Other modalities or referrals       Acupuncture treatment performed after written and verbal consent obtained. Patient aware that risk include, but are not limited to: pain during needle insertion; bleeding/bruising; infections; internal organ perforation. All questions were answered. Time out performed immediately prior to the start of the procedure. Patient identified by name and date of birth and agreement on procedure being performed was verified. ~~~~~~~ ~~~~~~~~~~~~~~~~~~~~    *Tolerates needling well  *Uses donut hole of table  *Likes fan and music   *After first tx felt \"lighter\"    1. General well being :     - Dragons: External: GV20, BL 11, BL 23, BL 61. Internal: CV 14', ST25, ST 32, ST41.   -Auricular SM, Pt0, Thalamus while supine         ~~~~~~~~~~~~~~~~~~~~~~~~~~~    Pt tolerated procedure well without apparent complications. Pt advised to avoid strenuous sexual activity, exercise, heavy meals, alcohol for the next 24 hours. Aware that rebound effect may occur following treatment but that should improve back to baseline in several days.

## 2022-02-11 ENCOUNTER — TELEPHONE (OUTPATIENT)
Dept: ONCOLOGY | Age: 67
End: 2022-02-11

## 2022-02-22 ENCOUNTER — HOSPITAL ENCOUNTER (OUTPATIENT)
Dept: PHYSICAL THERAPY | Age: 67
End: 2022-02-22

## 2022-03-02 ENCOUNTER — HOSPITAL ENCOUNTER (OUTPATIENT)
Dept: PHYSICAL THERAPY | Age: 67
Discharge: HOME | End: 2022-03-02
Payer: MEDICARE

## 2022-03-02 PROCEDURE — 97535 SELF CARE MNGMENT TRAINING: CPT | Performed by: PHYSICAL MEDICINE & REHABILITATION

## 2022-03-02 PROCEDURE — 97162 PT EVAL MOD COMPLEX 30 MIN: CPT | Performed by: PHYSICAL MEDICINE & REHABILITATION

## 2022-03-02 NOTE — PROGRESS NOTES
Physical Therapy at [facility],   a part of [address]  [place]  [facility] Jolynn Salas  Phone: [phone]  Fax: [phone]    Plan of Care/Statement of Necessity for Physical Therapy Services  2-15    Patient name: David Hendrix  : 1955  Provider#: [id number]  Referral source: Eduard Phillips NP      Medical/Treatment Diagnosis: Mixed incontinence [N39.46]     Prior Hospitalization: see medical history     Comorbidities: Cervical CA, Colon CA, colon resection, Breast CA, chemo/radiation   Prior Level of Function: no pelvic pain with bathing, hygiene. Medications: Verified on Patient Summary List  Start of Care: 2022      Onset Date:    The Plan of Care and following information is based on the information from the initial evaluation. Assessment/ key information:  Pt with complaints of pelvic pain with penetration, vaginal medical exam, bathing and hygiene ADLs. Assessment is ongoing. 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 Extra time needed for subjective exam today, high illness focus noted on subjective exam and with QFPFQ. History of 3 separate cancers is certainly contributory. She will discuss adding topical estrogen with Dr. Aditi Albrecht. She will benefit from skilled PT services to address her limitations and achieve her functional goals as stated on the plan of care. Evaluation Complexity History HIGH Complexity :3+ comorbidities / personal factors will impact the outcome/ POC ; Examination MEDIUM Complexity : 3 Standardized tests and measures addressing body structure, function, activity limitation and / or participation in recreation  ;Presentation MEDIUM Complexity : Evolving with changing characteristics  ; Clinical Decision Making MEDIUM Complexity : FOTO score of 26-74  Overall Complexity Rating: MEDIUM    Problem List: pain affecting function, decrease ADL/ functional abilitiies, decrease activity tolerance and decrease flexibility/ joint mobility   Treatment Plan may include any combination of the following: Therapeutic exercise, Therapeutic activities, Neuromuscular re-education, Physical agent/modality, Manual therapy, Patient education, Self Care training and Functional mobility training  Patient / Family readiness to learn indicated by: asking questions  Persons(s) to be included in education: patient (P)  Barriers to Learning/Limitations: yes;  emotional  Patient Goal (s): no pain with IC, bathing, wiping  Patient Self Reported Health Status: good  Rehabilitation Potential: good    Short Term Goals: To be accomplished in 6 weeks:  Patient will be independent with a progressive home exercise program    Patient will demonstrate & utilized pelvic floor ms protection techniques   Patient will complete internal pelvic exam PERFECT score testing. Patient will demonstrate PFM resting tone 3mV or less on sEMG biofeedback assessment   Patient will be independent with a progressive vaginal dilator therapy program  Patient will be independent with progressive body scan relaxation program     Long Term Goals: To be accomplished in 12 weeks:  Patient will demonstrate 3mV relaxation between [address] efforts on sEMG biofeedback. Patient will report no pain with bathing and wiping. Patient will have no pain with intercourse  Frequency / Duration: Patient to be seen 1 times per week for 6-12  weeks. Patient/ Caregiver education and instruction: self care    Certification Period: 03/02/22 - 06/01/22  Skip Judd PT, MSPT   3/2/2022     ________________________________________________________________________    I certify that the above Therapy Services are being furnished while the patient is under my care. I agree with the treatment plan and certify that this therapy is necessary.     [de-identified] Signature:____________________  Date:____________Time: _________         Lisa Swartz NP

## 2022-03-02 NOTE — PROGRESS NOTES
PT INITIAL EVALUATION NOTE - [facility] 2-15    Patient Name: Arelia Bloch  Date:3/2/2022  : 1955  [x]  Patient  Verified  [place] / Plan: [place] & [place] / Product Type: Medicare /    In time: 1200  Out time: 0100  Total Treatment Time (min): 60  Total Timed Codes (min): 60  1:1 Treatment Time ([facility] only): 60  Visit #: 1    Treatment Area: Mixed incontinence [N39.46]    SUBJECTIVE  Pain Level (0-10 scale): 0   Any medication changes, allergies to medications, adverse drug reactions, diagnosis change, or new procedure performed?: [] No    [x] Yes (see summary sheet for update)  Subjective:    Patient is a 74year old female with complaints of painful intercourse since at least , newer onset pelvic and labial pain with wiping and bathing. She saw a pelvic PT at [facility] in 2018 with mixed results, she was not comfortable with the internal vaginal manual treatment and didn't fully understand treatment rationale \"I didn't follow through with PT\". She would like to try pelvic PT again per her physician's recommendation. Pelvic pain is located just inside the labia, quite acute in nature. Pain with any insertion, to the point where she completely avoids penetration. She has pain with wiping, bathing, drying herself. At times she will use a cotton pad with baby oil to clean herself very gently due to acute tenderness. She is able to wear underwear, tight pants. She has no vaginal pain with walking, exercising. Sexual penetration pain has been present since at least  but the acute labial tenderness is newer since chemotherapy in . PMH is significant for 3 cancer episodes, breast CA in  treated with chemotherapy and radiation, she feels the chemotherapy affected multiple systems and worsened her vaginal pain sigificantly. She still continues to struggle with mouth ulcers. She is taking estrogen suppressing medication.    She tried estrace and Estring prior to breast cancer diagnosis, she felt the estrogen did reduce dyspareunia. She is very uncomfortable with the idea of restarting a topical estrogen now as she feels this may have caused her breast cancer. \"I gave myself breast cancer\". Pt underwent bladder sling procedure with mesh in 2005, she had excellent results post surgically with no ANAMIKA with tennis, running, sneezing. Around 2016 she felt her repair failed and caused her dyspareunia. She saw urogynocology, no surgery recommended at that time. PMH:  ANAMIKA Bladder sling procedure 2005    Cervical CA 1983- Resection   Colon CA 2008 - Bowel resection   Triple Positive Breast CA 2021- R Lumpectomy, Chemo/Radiation    PLOF: no pain with IC  Mechanism of Injury: post radiation  Previous Treatment/Compliance: none  Work Hx:  Retired  Living Situation: spouse  Pt Goals:  No pain with IC. Barriers: Anxiety / negative past experience with pelvic PT  Motivation: good  Substance use:   Cognition: A & O x 4       OBJECTIVE/EXAMINATION  Anxious appearing, difficult to direct through subjective exam, highly talkative. Pt is a poor historian, she struggles to explain her current symptoms, high illness focus \"I'm just a disaster\"   \"I'm falling apart\", struggles to relay a comprehensive treatment and symptom timeline. Multiple tangents making subjective exam difficult to obtain. Extra time needed for subjective exam.  She relates multiple negative experiences with multiple medical providers including pelvic PT at [facility]. Internal pelvic exam deferred today due to time limitations and patient hesitancy. Focused today on subjective evaluation and patient education. 30 min Self Care/Home Management:  []  See flow sheet :    Patient instructed in the role of physical therapy in the evaluation and treatment of pelvic floor dysfunction, applicable treatment modalities discussed.  Expectations for regular home exercise participation and expected visit frequency in to achieve the patient's goals were discussed. Patient instructed in pelvic floor anatomy and function including levator ani, puborectalis and superficial pelvic  musculature. Discusssed PT treatment options including sEMG biofeedback training, self trigger point release and massage, stretching, nervous system down training, modalities for down training, pain relief. Reassured pt that manual therapy is not expected to be uncomfortable or painful. Patient was provided information on vulvodynia for her consideration. Instructed patient to avoid using soaps, washes, douches  Instructed pt to use hypoallergenic, water based baby wipes, domi bottle and/or bidet for toilet hygiene. Advised pt to stop using baby oil, use coconut oil instead for hydration/soothing and/or cleaning. Discussed use of topical estrogen for improved tissue resilience, decreased pain with penetration. Pt to discuss this further with Dr. Christopher Webster next week. Rationale: Improve patient understanding, change daily habits. to improve the patients ability to eliminate labial and vaginal pain with ADLs. With   [] TE   [] TA   [] Neuro   [] SC   [] other: Patient Education: [x] Review HEP    [] Progressed/Changed HEP based on:   [] positioning   [] body mechanics   [] transfers   [] heat/ice application    [] other:      Other Objective/Functional Measures:     [place] Female Pelvic Floor Questionnaire  Bladder: 12/42  Great Bother  Bowel: 8/36 Great Bother  Prolapse: 0/15 No bother  Sexual Function:  15/19  Great bother         Pain Level (0-10 scale) post treatment: 0     ASSESSMENT/Changes in Function:   Pt with complaints of pelvic pain with penetration, vaginal medical exam, bathing and hygiene ADLs. 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 Extra time needed for subjective exam.  High illness focus noted on subjective exam and with QFPFQ.   History of 3 separate cancers is certainly contributory. She will discuss adding topical estrogen with Dr. Rianna García. She will benefit from skilled PT services to address her limitations and achieve her functional goals as stated on the plan of care.      [x]  See Plan of Connor Salazar, PT, MSPT   3/2/2022

## 2022-03-11 ENCOUNTER — ANCILLARY PROCEDURE (OUTPATIENT)
Dept: CARDIOLOGY CLINIC | Age: 67
End: 2022-03-11
Payer: MEDICARE

## 2022-03-11 VITALS
BODY MASS INDEX: 24.83 KG/M2 | SYSTOLIC BLOOD PRESSURE: 100 MMHG | DIASTOLIC BLOOD PRESSURE: 60 MMHG | WEIGHT: 149 LBS | HEIGHT: 65 IN

## 2022-03-11 DIAGNOSIS — Z51.81 ENCOUNTER FOR MONITORING CARDIOTOXIC DRUG THERAPY: ICD-10-CM

## 2022-03-11 DIAGNOSIS — Z79.899 ENCOUNTER FOR MONITORING CARDIOTOXIC DRUG THERAPY: ICD-10-CM

## 2022-03-11 DIAGNOSIS — C50.811 MALIGNANT NEOPLASM OF OVERLAPPING SITES OF RIGHT BREAST IN FEMALE, ESTROGEN RECEPTOR POSITIVE (HCC): ICD-10-CM

## 2022-03-11 DIAGNOSIS — Z17.0 MALIGNANT NEOPLASM OF OVERLAPPING SITES OF RIGHT BREAST IN FEMALE, ESTROGEN RECEPTOR POSITIVE (HCC): ICD-10-CM

## 2022-03-11 PROCEDURE — 93308 TTE F-UP OR LMTD: CPT | Performed by: SPECIALIST

## 2022-03-16 ENCOUNTER — HOSPITAL ENCOUNTER (OUTPATIENT)
Dept: PHYSICAL THERAPY | Age: 67
Discharge: HOME | End: 2022-03-16
Payer: MEDICARE

## 2022-03-16 LAB
ECHO AO ROOT DIAM: 3.1 CM
ECHO AO ROOT INDEX: 1.77 CM/M2
ECHO LA DIAMETER INDEX: 1.89 CM/M2
ECHO LA DIAMETER: 3.3 CM
ECHO LA TO AORTIC ROOT RATIO: 1.06
ECHO LA VOL 2C: 33 ML (ref 22–52)
ECHO LA VOL 4C: 23 ML (ref 22–52)
ECHO LA VOL BP: 28 ML (ref 22–52)
ECHO LA VOL/BSA BIPLANE: 16 ML/M2 (ref 16–34)
ECHO LA VOLUME AREA LENGTH: 30 ML
ECHO LA VOLUME INDEX A2C: 19 ML/M2 (ref 16–34)
ECHO LA VOLUME INDEX A4C: 13 ML/M2 (ref 16–34)
ECHO LA VOLUME INDEX AREA LENGTH: 17 ML/M2 (ref 16–34)
ECHO LV EDV A2C: 45 ML
ECHO LV EDV A4C: 58 ML
ECHO LV EDV BP: 52 ML (ref 56–104)
ECHO LV EDV INDEX A4C: 33 ML/M2
ECHO LV EDV INDEX BP: 30 ML/M2
ECHO LV EDV NDEX A2C: 26 ML/M2
ECHO LV EJECTION FRACTION A2C: 56 %
ECHO LV EJECTION FRACTION A4C: 60 %
ECHO LV EJECTION FRACTION BIPLANE: 57 % (ref 55–100)
ECHO LV ESV A2C: 20 ML
ECHO LV ESV A4C: 23 ML
ECHO LV ESV BP: 22 ML (ref 19–49)
ECHO LV ESV INDEX A2C: 11 ML/M2
ECHO LV ESV INDEX A4C: 13 ML/M2
ECHO LV ESV INDEX BP: 13 ML/M2
ECHO LV FRACTIONAL SHORTENING: 43 % (ref 28–44)
ECHO LV GLOBAL LONGITUDINAL STRAIN (GLS): -18.4 %
ECHO LV INTERNAL DIMENSION DIASTOLE INDEX: 2.29 CM/M2
ECHO LV INTERNAL DIMENSION DIASTOLIC: 4 CM (ref 3.9–5.3)
ECHO LV INTERNAL DIMENSION SYSTOLIC INDEX: 1.31 CM/M2
ECHO LV INTERNAL DIMENSION SYSTOLIC: 2.3 CM
ECHO LV IVSD: 1 CM (ref 0.6–0.9)
ECHO LV MASS 2D: 127.1 G (ref 67–162)
ECHO LV MASS INDEX 2D: 72.6 G/M2 (ref 43–95)
ECHO LV POSTERIOR WALL DIASTOLIC: 1 CM (ref 0.6–0.9)
ECHO LV RELATIVE WALL THICKNESS RATIO: 0.5

## 2022-03-16 PROCEDURE — 97110 THERAPEUTIC EXERCISES: CPT | Performed by: PHYSICAL MEDICINE & REHABILITATION

## 2022-03-16 PROCEDURE — 93308 TTE F-UP OR LMTD: CPT | Performed by: SPECIALIST

## 2022-03-16 NOTE — PROGRESS NOTES
PT DAILY TREATMENT NOTE - [facility] 2-15    Patient Name: Kyle Olson  Date:3/16/2022  : 1955  [x]  Patient  Verified  Payor: [place] MEDICARE / Plan: [place] MEDICARE PART A & B / Product Type: Medicare /    In time: 0800  Out time: 0845  Total Treatment Time (min): 45  Total Timed Codes (min): 45  1:1 Treatment Time ([facility] only): 39  Visit #:  2    Treatment Area: Mixed incontinence [N39.46]    SUBJECTIVE  Pain Level (0-10 scale): 0   Any medication changes, allergies to medications, adverse drug reactions, diagnosis change, or new procedure performed?: [x] No    [] Yes (see summary sheet for update)  Subjective functional status/changes:   [] No changes reported    Dealing with oral LS since chemo. Has fecal incontinence Type 1 stools 1-2 x per week, no sensation. Had Type 6-7 stools 1-2 x per month, unable to pinpoint trigger. Will have episodes of suprapubic discomfort /bladder and urethral pain with pain with urination several times a month, uses AZO PRN and drinks cranberry juice daily. Most resent episode resolved with NSAID. No pain now but has exquisite sensitivity to R inner labia with wiping or washing. Purchased bidet, wet wipes and this has helped some. OBJECTIVE    External Pelvic Exam  Non tender through external pelvic clock  Q-Tip test negative   No POP at rest or with sustained valsalva  Active contraction: present  Active relaxation: present   Involuntary relaxation: present   Narrow introitus noted. There is a irregular circular patch of pale tissue inner R labia into introitus at 8:00 - 10:00 with a small line of pale tissue extending to 12:00, appearance is consistent with LS, pt reports this is where she has exquisite tenderness. In contrast L labial tissue and introitus is normal in color. Internal Vaginal Exam:   Deferred due to sensitivity at layer 1.       45 min Self Care/Home Management:  []  See flow sheet :    Suspect lichen sclerosis R.    Advised patient to reach out to gyn for further assessment and treatment options. Discussed topical treatment options. She may benefit from topical vaginal estrogen, pt to discuss this further with Dr. Aditi Albrecht. Advised pt to stop all cranberry products. Discussed bladder irritants, her intake is fairly low (other than daily cranberry). She hydrates well. Will reevaluate bladder discomfort once she stops cranberry, if no improvement she may benefit from further evaluation by urogynocology to rule out interstitial cystitis. Further internal vaginal PT exam is not indicated until LS is addressed and improved. Once she is further evaluated and treated we can consider internal work as she tolerates. The hope is once LS clears she will feel considerably better with toileting, hygrine and IC. Episodes of loose stools not addressed today. Disscussed further evaluation by GI. Rationale: improve patient understanding, change daily habits. to improve the patients ability to eliminate pelvic pain with ADLs. With   [] TE   [] TA   [] Neuro   [x] SC   [] other: Patient Education: [x] Review HEP    [] Progressed/Changed HEP based on:   [] positioning   [] body mechanics   [] transfers   [] heat/ice application    [x] other:  above     Other Objective/Functional Measures: Tolerated exam well. Demonstrated good understanding of all concepts discussed today. Pain Level (0-10 scale) post treatment: 0      ASSESSMENT/Changes in Function:   Vulvodynia ruled out. Suspect LS. Pt to see gyn for further evaluation. Patient will continue to benefit from skilled PT services to modify and progress therapeutic interventions to attain remaining goals. [x]  See Plan of Care  []  See progress note/recertification  []  See Discharge Summary         Progress towards goals / Updated goals:  Progressing toward all goals.       PLAN  [x]  Upgrade activities as tolerated     [x]  Continue plan of care  []  Update interventions per flow sheet       []  Discharge due to:_  []  Other:_      Skip Judd, PT, MSPT 3/16/2022

## 2022-03-18 PROBLEM — Z85.038 HISTORY OF COLON CANCER: Status: ACTIVE | Noted: 2021-06-16

## 2022-03-18 PROBLEM — Z17.0 MALIGNANT NEOPLASM OF OVERLAPPING SITES OF RIGHT BREAST IN FEMALE, ESTROGEN RECEPTOR POSITIVE (HCC): Status: ACTIVE | Noted: 2021-06-03

## 2022-03-18 PROBLEM — Z51.11 ENCOUNTER FOR ANTINEOPLASTIC CHEMOTHERAPY: Status: ACTIVE | Noted: 2021-06-16

## 2022-03-18 PROBLEM — C50.811 MALIGNANT NEOPLASM OF OVERLAPPING SITES OF RIGHT BREAST IN FEMALE, ESTROGEN RECEPTOR POSITIVE (HCC): Status: ACTIVE | Noted: 2021-06-03

## 2022-03-18 PROBLEM — Z85.41 HISTORY OF CERVICAL CANCER: Status: ACTIVE | Noted: 2021-06-16

## 2022-03-19 PROBLEM — R11.0 CHEMOTHERAPY-INDUCED NAUSEA: Status: ACTIVE | Noted: 2021-07-21

## 2022-03-19 PROBLEM — T88.7XXA MEDICATION SIDE EFFECT: Status: ACTIVE | Noted: 2021-10-26

## 2022-03-19 PROBLEM — T45.1X5A CHEMOTHERAPY-INDUCED NAUSEA: Status: ACTIVE | Noted: 2021-07-21

## 2022-03-19 PROBLEM — Z09 CHEMOTHERAPY FOLLOW-UP EXAMINATION: Status: ACTIVE | Noted: 2021-06-23

## 2022-03-19 PROBLEM — Z51.81 ENCOUNTER FOR MONITORING CARDIOTOXIC DRUG THERAPY: Status: ACTIVE | Noted: 2021-09-08

## 2022-03-19 PROBLEM — Z95.828 PORT-A-CATH IN PLACE: Status: ACTIVE | Noted: 2021-06-16

## 2022-03-19 PROBLEM — M25.50 ARTHRALGIA: Status: ACTIVE | Noted: 2021-07-21

## 2022-03-19 PROBLEM — Z79.899 ENCOUNTER FOR MONITORING CARDIOTOXIC DRUG THERAPY: Status: ACTIVE | Noted: 2021-09-08

## 2022-03-19 PROBLEM — K12.31 MUCOSITIS DUE TO CHEMOTHERAPY: Status: ACTIVE | Noted: 2021-06-23

## 2022-03-20 PROBLEM — R53.83 CHEMOTHERAPY-INDUCED FATIGUE: Status: ACTIVE | Noted: 2021-09-08

## 2022-03-20 PROBLEM — T45.1X5A CHEMOTHERAPY-INDUCED FATIGUE: Status: ACTIVE | Noted: 2021-09-08

## 2022-03-20 PROBLEM — R06.09 DOE (DYSPNEA ON EXERTION): Status: ACTIVE | Noted: 2021-10-26

## 2022-03-23 ENCOUNTER — APPOINTMENT (OUTPATIENT)
Dept: PHYSICAL THERAPY | Age: 67
End: 2022-03-23
Payer: MEDICARE

## 2022-03-30 ENCOUNTER — HOSPITAL ENCOUNTER (OUTPATIENT)
Dept: PHYSICAL THERAPY | Age: 67
Discharge: HOME | End: 2022-03-30
Payer: MEDICARE

## 2022-03-30 PROCEDURE — 97535 SELF CARE MNGMENT TRAINING: CPT | Performed by: PHYSICAL MEDICINE & REHABILITATION

## 2022-03-30 NOTE — PROGRESS NOTES
PT DAILY TREATMENT NOTE - [facility] 2-15    Patient Name: Maribell Dexter  Date:3/30/2022  : 1955  [x]  Patient  Verified  Payor: [place] MEDICARE / Plan: [place] MEDICARE PART A & B / Product Type: Medicare /    In time: 0800  Out time: 0845  Total Treatment Time (min): 45  Total Timed Codes (min): 45  1:1 Treatment Time ( [address] only): 39  Visit #:  3    Treatment Area: Mixed incontinence [N39.46]    SUBJECTIVE  Pain Level (0-10 scale):  0   Any medication changes, allergies to medications, adverse drug reactions, diagnosis change, or new procedure performed?: [x] No    [] Yes (see summary sheet for update)  Subjective functional status/changes:   [] No changes reported  Saw GYN, LS confirmed, started clobetesol, responded very well to this with decreased pain   She did not fully understand dosing instructions, stopped clobetesol after about 4 days. Also starting vaginal estrogen topicical - cleared per oncology. Also started topical for mouth sores, good response to this as well. Struggles to clear her rectum with initial stooling attempt, has to come back to toilet soon after first pass. Pt notes L sided vaginal wall pain - severe- with use of speculum on GYN exam.        OBJECTIVE    External Pelvic Exam:   Noted visual decrease in LS appearance however it is still present. Visible patch at 8:00. Possible involvement along perineum. Tissues not palpated. No internal exam.           45  min Self Care/Home Management:  []  See flow sheet :    Advised pt to restart clobetesol 2x per day then down to 1x per day as she still has visible LS patches. Patient instructed in the role of physical therapy in the evaluation and treatment of pelvic floor dysfunction, applicable treatment modalities discussed. Expectations for regular home exercise participation and expected visit frequency in to achieve the patient's goals were discussed.         Patient instructed in pelvic floor anatomy and function including levator ani, puborectalis and superficial pelvic  musculature. Patient educated in proper defecation posture and technique including use of Squatty Potty for better puborectalis ms relaxation. Pt to avoid straining to pass stool in order to decrease strain on pelvic floor. Rationale: improve patient understanding, change daily habits. to improve the patients ability to eliminate constipation/strain on pelvic floor. With   [] TE   [] TA   [] Neuro   [x] SC   [] other: Patient Education: [x] Review HEP    [] Progressed/Changed HEP based on:   [] positioning   [] body mechanics   [] transfers   [] heat/ice application    [x] other: Above       Other Objective/Functional Measures: LS visibly improving, pt reports symptom reduction. Pain Level (0-10 scale) post treatment: 0     ASSESSMENT/Changes in Function:   LS confirmed by GYN. Appears to be responding very well to clobetesol. Needs to finish full course. Pt will benefit from internal manual therapy for trigger point release, sEMG biofeedback with vaginal sensor to address pelvic floor ms hypertonicity and intolerance to IC. Would like to see LS resolve before starting any internal work, pt understands and agrees with this. I anticipate she will note further tissue improvements with regular use of vaginal estrogen cream.  On days when both meds are administered pt should apply clobetesol in AM,  Estrogen cream in PM (or vice versa). There is no harm in mixing the two, but the topicals are more effective if spaced hours apart. Patient will continue to benefit from skilled PT services to modify and progress therapeutic interventions, address functional mobility deficits, analyze and address soft tissue restrictions, analyze and cue movement patterns and analyze and modify body mechanics/ergonomics to attain remaining goals.      [x]  See Plan of Care  []  See progress note/recertification  []  See Discharge Summary         Progress towards goals / Updated goals:  Able to advance exercises today with good tolerance. Pt continues to need instruction for correct exercise form and performance. Continues to demonstrate good potential to achieve functional goals stated on the plan of care.       PLAN  [x]  Upgrade activities as tolerated     []  Continue plan of care  []  Update interventions per flow sheet       []  Discharge due to:_  []  Other:_      Niki Palomo, PT, MSPT  3/30/2022

## 2022-04-06 ENCOUNTER — APPOINTMENT (OUTPATIENT)
Dept: PHYSICAL THERAPY | Age: 67
End: 2022-04-06

## 2022-04-07 ENCOUNTER — APPOINTMENT (OUTPATIENT)
Dept: PHYSICAL THERAPY | Age: 67
End: 2022-04-07

## 2022-04-14 ENCOUNTER — OFFICE VISIT (OUTPATIENT)
Dept: ONCOLOGY | Age: 67
End: 2022-04-14
Payer: MEDICARE

## 2022-04-14 ENCOUNTER — DOCUMENTATION ONLY (OUTPATIENT)
Dept: ONCOLOGY | Age: 67
End: 2022-04-14

## 2022-04-14 VITALS
HEART RATE: 83 BPM | BODY MASS INDEX: 24.32 KG/M2 | OXYGEN SATURATION: 96 % | WEIGHT: 146 LBS | TEMPERATURE: 96 F | DIASTOLIC BLOOD PRESSURE: 79 MMHG | HEIGHT: 65 IN | SYSTOLIC BLOOD PRESSURE: 132 MMHG

## 2022-04-14 DIAGNOSIS — Z17.0 MALIGNANT NEOPLASM OF OVERLAPPING SITES OF RIGHT BREAST IN FEMALE, ESTROGEN RECEPTOR POSITIVE (HCC): Primary | ICD-10-CM

## 2022-04-14 DIAGNOSIS — C50.811 MALIGNANT NEOPLASM OF OVERLAPPING SITES OF RIGHT BREAST IN FEMALE, ESTROGEN RECEPTOR POSITIVE (HCC): Primary | ICD-10-CM

## 2022-04-14 PROCEDURE — G9899 SCRN MAM PERF RSLTS DOC: HCPCS | Performed by: INTERNAL MEDICINE

## 2022-04-14 PROCEDURE — G8510 SCR DEP NEG, NO PLAN REQD: HCPCS | Performed by: INTERNAL MEDICINE

## 2022-04-14 PROCEDURE — 1101F PT FALLS ASSESS-DOCD LE1/YR: CPT | Performed by: INTERNAL MEDICINE

## 2022-04-14 PROCEDURE — G8427 DOCREV CUR MEDS BY ELIG CLIN: HCPCS | Performed by: INTERNAL MEDICINE

## 2022-04-14 PROCEDURE — G8536 NO DOC ELDER MAL SCRN: HCPCS | Performed by: INTERNAL MEDICINE

## 2022-04-14 PROCEDURE — G8400 PT W/DXA NO RESULTS DOC: HCPCS | Performed by: INTERNAL MEDICINE

## 2022-04-14 PROCEDURE — 99214 OFFICE O/P EST MOD 30 MIN: CPT | Performed by: INTERNAL MEDICINE

## 2022-04-14 PROCEDURE — G0463 HOSPITAL OUTPT CLINIC VISIT: HCPCS | Performed by: INTERNAL MEDICINE

## 2022-04-14 PROCEDURE — 1090F PRES/ABSN URINE INCON ASSESS: CPT | Performed by: INTERNAL MEDICINE

## 2022-04-14 PROCEDURE — G8420 CALC BMI NORM PARAMETERS: HCPCS | Performed by: INTERNAL MEDICINE

## 2022-04-14 PROCEDURE — G9711 PT HX TOT COL OR COLON CA: HCPCS | Performed by: INTERNAL MEDICINE

## 2022-04-14 NOTE — PROGRESS NOTES
[employer]  Oncology Social Work  Encounter     Patient Name:  Pam Diallo \"Luz\" Tawanna     Medical History: Right Breast Cancer on adjuvant weekly Taxol/Herceptin. Advance Directives: Patient does not have an advanced medical directive, and did not express interest in completing one today. Narrative:   Met with the patient during her office visit today. The patient shared that she has a strong desire and calling to give back to other cancer patients. Brainstormed ways to give back to her community. Offered continued support to the patient as needed. Barriers to Care:   No barriers to care identified at this time. Plan:  Ongoing psychosocial support as desired by patient.       Referral/Handouts:   Complementary therapies referral  Support Groups referral  Meera Olson LCSW

## 2022-04-14 NOTE — PROGRESS NOTES
[facility] [place] [address] [address], [address] [address], [address] [place] [address] Macy Santana: [phone]  F: [phone]    Reason for Visit:   Luz Vasquez is a 74 y.o. female who is seen today in office for follow up of Right Breast Cancer     Treatment History:   · Breast biopsy  · Lumpectomy 4/21/21 at [facility]  · Adjuvant weekly Taxol/Herceptin x 12 weeks from 6/16/21 - 9/8/21  · Taxol DR to 60 mg/m2 on Day 8 Cycle 3 due to side effects  stopped 9/15/21. Radiation. on AI. STAGE:  · T2N0 ER+ HER2 +  · MYRISK negative    History of Present Illness: Luz Vasquez is a 74 y.o. female seen today in office for follow up of right breast cancer ER+ HER2+ hx of lumpectomy/ radiation and on adjuvant AI. Tolerating AI with some hot flashes. Not too bad. Has list of questions today. Is on thyroid/ cholesterol med/ vit D. Depression on med. On biotin for nails. Got lichens planus and on creams for this. Dx from PT. Saw GYN. Low sex drive. Also saw dentist.   Healthy overall. Feels fine today. No fevers/ chills/ chest pain/ SOB/ nausea/ vomiting/diarrhea/ pain/fatigue      Last visit:   Off TH since 9/21. Stopped due to possible cardiac issues. Still seeing cardio   Saw breast surgery and doing well. Had a rash ? Contact dermatitis. Saw derm in [place] and got a cream.   Rash comes and goes. Has vaginal dryness and seeing GYN and to see Uro/GYN.    No fevers/ chills/ chest pain/ SOB/ nausea/ vomiting/diarrhea/ pain/fatigue         Past Medical History:   Diagnosis Date    Cancer ([address]     cervical and colon    Cervical cancer ([address] 1983    Colon cancer ([address] 2008      Past Surgical History:   Procedure Laterality Date    ENDOSCOPY, COLON, DIAGNOSTIC        Social History     Tobacco Use    Smoking status: Never Smoker    Smokeless tobacco: Never Used   Substance Use Topics    Alcohol use: Never      Family History   Problem Relation Age of Onset    Cancer Mother    Self Breast Cancer Mother  Cancer Father     Melanoma Father     Breast Cancer Sister     Colon Cancer Brother     Prostate Cancer Brother     Heart Disease Mother     Hypertension Sister      Current Outpatient Medications   Medication Sig    levothyroxine (SYNTHROID) 50 mcg tablet Take 1 Tablet by mouth Daily (before breakfast) for 30 days.  atorvastatin (LIPITOR) 10 mg tablet Take 1 Tablet by mouth daily for 30 days.  cholecalciferol (VITAMIN D3) (50,000 UNITS /1250 MCG) capsule Take 1 Capsule by mouth every seven (7) days.  anastrozole (ARIMIDEX) 1 mg tablet Take 1 mg by mouth daily.  Tobradex ophthalmic ointment     mometasone (ELOCON) 0.1 % topical cream     sertraline (ZOLOFT) 50 mg tablet TAKE ONE TABLET BY MOUTH EVERY DAY    metoprolol succinate (TOPROL-XL) 25 mg XL tablet Take 1 Tablet by mouth daily. No current facility-administered medications for this visit. Allergies   Allergen Reactions    Cipro [Ciprofloxacin Hcl] Nausea and Vomiting    Benadryl Allergy Decongestant Nausea and Vomiting    Ciprofloxacin Nausea and Vomiting      Review of Systems:  A complete review of systems was obtained, negative except as described above and as reported on ROS sheet scanned into system. Physical Exam:     Visit Vitals  /79 (BP 1 Location: Left upper arm, BP Patient Position: Sitting)   Pulse 83   Temp (!) 96 °F (35.6 °C) (Temporal)   Ht 5' 5\" (1.651 m)   Wt 146 lb (66.2 kg)   SpO2 96%   BMI 24.30 kg/m²     ECOG PS: 0  General: No distress  Eyes: Anicteric sclerae  HENT: Atraumatic, wearing a mask  Neck: Supple  Respiratory: CTAB, normal respiratory effort  CV: Normal rate, regular rhythm, no murmurs, no peripheral edema  GI: Soft, nontender, nondistended, no masses  MS: Normal gait and station. Digits without clubbing or cyanosis. Skin: No rashes, ecchymoses, or petechiae. Normal temperature, turgor, and texture.    Psych: Alert, oriented, appropriate affect, normal judgment/insight  Neuro: Non focal  Breast no masses palpable b/l     Results:     Lab Results   Component Value Date/Time    WBC 5.2 12/02/2021 10:01 AM    HGB 14.3 12/02/2021 10:01 AM    HCT 42.4 12/02/2021 10:01 AM    PLATELET 881 13/29/1951 10:01 AM    MCV 93 12/02/2021 10:01 AM    ABS. NEUTROPHILS 3.3 12/02/2021 10:01 AM    HGB (POC) 14.3 09/20/2016 09:28 AM    HCT (POC) 43.1 09/20/2016 09:28 AM     Lab Results   Component Value Date/Time    Sodium 142 12/02/2021 10:01 AM    Potassium 4.8 12/02/2021 10:01 AM    Chloride 105 12/02/2021 10:01 AM    CO2 26 12/02/2021 10:01 AM    Glucose 95 12/02/2021 10:01 AM    BUN 15 12/02/2021 10:01 AM    Creatinine 0.90 12/02/2021 10:01 AM    GFR est AA 76 12/02/2021 10:01 AM    GFR est non-AA 66 12/02/2021 10:01 AM    Calcium 9.6 12/02/2021 10:01 AM     Lab Results   Component Value Date/Time    Bilirubin, total 0.2 12/02/2021 10:01 AM    ALT (SGPT) 16 12/02/2021 10:01 AM    Alk. phosphatase 95 12/02/2021 10:01 AM    Protein, total 7.0 12/02/2021 10:01 AM    Albumin 4.4 12/02/2021 10:01 AM    Globulin 3.1 09/15/2021 09:27 AM     All reports from outside facility scanned into media    08/23/21    ECHO ADULT COMPLETE 08/23/2021 8/23/2021    Interpretation Summary  · LV: Estimated LVEF is 55 - 60%. Normal cavity size, wall thickness and systolic function (ejection fraction normal). Normal left ventricular strain. Global longitudinal strain is -15.4%. Mild (grade 1) left ventricular diastolic dysfunction. Signed by: Daja Sanchez MD on 8/23/2021  1:06 PM    Records reviewed and summarized above. Pathology report(s) reviewed above. Radiology report(s) reviewed above. Assessment:/PLAN     1) Stage 2 RIGHT Breast Cancer ER+ Her2+ post Lumpectomy 4/21/21  She started weekly Taxol/Herceptin on 6/16/21. Stopped TH 9/21 due to possible cardiac issues. Following with cardio. Seen today for 5 mo fu. Clinically pt doing well overall.    Did radiation   on adjuvant AI and tolerating fine with occ hot flashes. Reviewed list of questions today. mammo due 5/22. Continue MRI also. Labs and routine HM with PCP. 2) Hx of Colon Cancer (2008) and Cervical Cancer (2750)  She did not have chemo. To see GI. On a course of surveillance. 3)  Lichens planus/ vaginal dryness. Seeing GYN. 4) thyroid/ high cholesterol. Per PCP. 5) low EF on therapy. Seeing cardio. 6) Psychosocial  Mood good. Coping well. She has good family support. SW/NN support as needed. Has supportive . Call if questions. Follow up in 3 months    I appreciate the opportunity to participate in Ms. Hayden Stacy's care.     Signed By: Martine Barrientos DO

## 2022-04-14 NOTE — LETTER
4/14/2022    Patient: Stann Lanes   YOB: 1955   Date of Visit: 4/14/2022     Abraham Teague MD  [address]    Dear Abraham Teague MD,      Thank you for referring Ms. Dariela Avila to [address] [place] [address] for evaluation. My notes for this consultation are attached. If you have questions, please do not hesitate to call me. I look forward to following your patient along with you.       Sincerely,    Leanna Singh, DO

## 2022-04-14 NOTE — PROGRESS NOTES
Annemarie Amezcua is a 74 y.o. female  Chief Complaint   Patient presents with    Follow-up    Breast Cancer     1. Have you been to the ER, urgent care clinic since your last visit? Hospitalized since your last visit? No.  2. Have you seen or consulted any other health care providers outside of the [address] [place] [address] since your last visit? Include any pap smears or colon screening.  No.

## 2022-04-16 DIAGNOSIS — C50.811 MALIGNANT NEOPLASM OF OVERLAPPING SITES OF RIGHT BREAST IN FEMALE, ESTROGEN RECEPTOR POSITIVE (HCC): ICD-10-CM

## 2022-04-16 DIAGNOSIS — E55.9 VITAMIN D DEFICIENCY: ICD-10-CM

## 2022-04-16 DIAGNOSIS — Z17.0 MALIGNANT NEOPLASM OF OVERLAPPING SITES OF RIGHT BREAST IN FEMALE, ESTROGEN RECEPTOR POSITIVE (HCC): ICD-10-CM

## 2022-04-18 RX ORDER — ASPIRIN 325 MG
50000 TABLET, DELAYED RELEASE (ENTERIC COATED) ORAL
Qty: 12 CAPSULE | Refills: 1 | Status: SHIPPED | OUTPATIENT
Start: 2022-04-18

## 2022-04-20 ENCOUNTER — APPOINTMENT (OUTPATIENT)
Dept: PHYSICAL THERAPY | Age: 67
End: 2022-04-20

## 2022-05-18 ENCOUNTER — HOSPITAL ENCOUNTER (OUTPATIENT)
Dept: PHYSICAL THERAPY | Age: 67
Discharge: HOME | End: 2022-05-18
Payer: MEDICARE

## 2022-05-18 PROCEDURE — 97110 THERAPEUTIC EXERCISES: CPT | Performed by: PHYSICAL MEDICINE & REHABILITATION

## 2022-05-18 NOTE — PROGRESS NOTES
PT DAILY TREATMENT NOTE - [facility] 2-15    Patient Name: Elana Triplett  Date:2022  : 1955  [x]  Patient  Verified  [place] MED[place] / Plan: [place] MEDICARE PART A & B / Product Type: Medicare /    In time: 930  Out time:   Total Treatment Time (min): 45  Total Timed Codes (min): 45  1:1 Treatment Time ( [address] only): 39   Visit #:  4    Treatment Area: Mixed incontinence [N39.46]    SUBJECTIVE  Pain Level (0-10 scale): 0  Any medication changes, allergies to medications, adverse drug reactions, diagnosis change, or new procedure performed?: [x] No    [] Yes (see summary sheet for update)  Subjective functional status/changes:   [] No changes reported  Completed course of clobetesol with significant improvement in superficial perineal and labial pain. Has not tried IC. Has not started vaginal estrogen cream.     OBJECTIVE      45 min Therapeutic Exercise:  [] See flow sheet :    Access Code: [email or web address]  URL: [email or web address]  Date: 2022  Prepared by: Mathew Calixto    Exercises  Half Kneeling Hip Flexor Stretch - 1 x daily - 7 x weekly - 4 reps - 30s hold  Seated Hamstring Stretch - 1 x daily - 7 x weekly - 4 reps - 30s hold  Seated Piriformis Stretch with Trunk Bend - 1 x daily - 7 x weekly - 4 reps - 30s hold  Supine Pelvic Floor Stretch - 1 x daily - 7 x weekly - 10 reps - 30s hold  Deep Squat LEANED AGAINST YOUR COUCH - 1 x daily - 7 x weekly - 4 reps - 30s hold  Butterfly Groin Stretch - Sit on floor against a wall. - 1 x daily - 7 x weekly - 4 reps - 30 seconds hold     Rationale: increase ROM and improve coordination to improve the patients ability to eliminate pain with IC.              With   [x] TE   [] TA   [] Neuro   [] SC   [] other: Patient Education: [x] Review HEP    [] Progressed/Changed HEP based on:   [] positioning   [] body mechanics   [] transfers   [] heat/ice application    [x] other:     Stop Kegals  Start vaginal Estrogen cream ASAP, use as directed by physician  Discussed OhNut device as an assist to limit depth of penetration  See Dr. Becki Vann for second opinion on mesh presentation. Return to start manual therapy after 1 month of estrogen cream use. Other Objective/Functional Measures:       External Pelvic Exam  Non tender through external pelvic clock  No POP at rest or with sustained valsalva  Active contraction: present  Active relaxation: present  Involuntary relaxation: prestent   Improved appearance of LS lesions since last visit    Internal Vaginal Exam:  Layer 1 non - tender, normal tone  Layer 2/3 non tender normal tone R      Hypertonic and exquisitely tender L at 2:00 position with palpable mesh edge appreciated. Bladder neck mobility: WNL    PERFECT SCORE CHART  P =  Power (Laycock Scale Grade 0-5)   4+/5  E =  Endurance (How long pt holds max contraction)  10   R =  Repetitions (How many times the repeats holds)  5  F =  Fast Twitch (How many 1 second contractions in 10 seconds) NT  E =  Elevation (Lift of post vaginal wall toward pubic bone) Present  C =  Coordinated cocontraction of transverse abdominus absent  T = Timing (squeeze and lift with cough) absent     Pain Level (0-10 scale) post treatment:  No pain at rest, exquisite tenderness to L internal layer 2 at 2:00 position at mesh edge. ASSESSMENT/Changes in Function:   Needs urogynocology evaluation re: palpable mesh L. Needs to start estrogen now. She will tolerate direct techniques better once she has this on board, will see her back in a month. May benefit from vaginal dilator therapy, desensitization techniques to tolerate IC. LS is improved objectively and subjectively after using clobetesol. Pt understands she may have flare ups, needs to start clobetesol and first bother. Patient will continue to benefit from skilled PT services to modify and progress therapeutic interventions to attain remaining goals.      [x]  See Plan of Care  []  See progress note/recertification  []  See Discharge Summary         Progress towards goals / Updated goals:    Short Term Goals: To be accomplished in 6 weeks:  Patient will be independent with a progressive home exercise program  MET  Patient will demonstrate & utilized pelvic floor ms protection techniques MET  Patient will complete internal pelvic exam PERFECT score testing. MET  Patient will demonstrate PFM resting tone 3mV or less on sEMG biofeedback assessment      Not initiated  Patient will be independent with a progressive vaginal dilator therapy program    Not initiated  Patient will be independent with progressive body scan relaxation program Not initiated     Long Term Goals: To be accomplished in 12 weeks:  Patient will demonstrate 3mV relaxation between [facility] efforts on sEMG biofeedback. Patient will report no pain with bathing and wiping. MET  Patient will have no pain with intercourse    PLAN  [x]  Upgrade activities as tolerated     []  Continue plan of care  []  Update interventions per flow sheet       []  Discharge due to:_  [x]  Other: See back in 1 month to allow for estrogen treatment.       Hui Fierro, PT,MSPT 5/18/2022

## 2022-05-24 ENCOUNTER — HOSPITAL ENCOUNTER (OUTPATIENT)
Dept: MAMMOGRAPHY | Age: 67
Discharge: HOME | End: 2022-05-24
Attending: NURSE PRACTITIONER
Payer: MEDICARE

## 2022-05-24 DIAGNOSIS — Z17.0 MALIGNANT NEOPLASM OF OVERLAPPING SITES OF RIGHT BREAST IN FEMALE, ESTROGEN RECEPTOR POSITIVE (HCC): ICD-10-CM

## 2022-05-24 DIAGNOSIS — C50.811 MALIGNANT NEOPLASM OF OVERLAPPING SITES OF RIGHT BREAST IN FEMALE, ESTROGEN RECEPTOR POSITIVE (HCC): ICD-10-CM

## 2022-05-24 DIAGNOSIS — Z98.890 HISTORY OF LUMPECTOMY OF RIGHT BREAST: ICD-10-CM

## 2022-05-24 PROCEDURE — 77062 BREAST TOMOSYNTHESIS BI: CPT

## 2022-05-24 NOTE — PROGRESS NOTES
HIPPA Verified. Called pt on mobile phone number listed. Patient was made aware of most recent mammogram being negative/good per Provider. Patient verbalized understanding and expressed no question!   Yanick Hermrann

## 2022-05-31 ENCOUNTER — APPOINTMENT (OUTPATIENT)
Dept: PHYSICAL THERAPY | Age: 67
End: 2022-05-31
Payer: MEDICARE

## 2022-06-01 ENCOUNTER — APPOINTMENT (OUTPATIENT)
Dept: PHYSICAL THERAPY | Age: 67
End: 2022-06-01

## 2022-06-02 ENCOUNTER — OFFICE VISIT (OUTPATIENT)
Dept: CARDIOLOGY CLINIC | Age: 67
End: 2022-06-02
Payer: MEDICARE

## 2022-06-02 ENCOUNTER — ANCILLARY PROCEDURE (OUTPATIENT)
Dept: CARDIOLOGY CLINIC | Age: 67
End: 2022-06-02
Payer: MEDICARE

## 2022-06-02 VITALS
HEART RATE: 76 BPM | DIASTOLIC BLOOD PRESSURE: 82 MMHG | BODY MASS INDEX: 24.32 KG/M2 | SYSTOLIC BLOOD PRESSURE: 126 MMHG | RESPIRATION RATE: 16 BRPM | WEIGHT: 146 LBS | HEIGHT: 65 IN | OXYGEN SATURATION: 97 %

## 2022-06-02 VITALS
HEIGHT: 65 IN | SYSTOLIC BLOOD PRESSURE: 132 MMHG | DIASTOLIC BLOOD PRESSURE: 79 MMHG | BODY MASS INDEX: 24.32 KG/M2 | WEIGHT: 146 LBS

## 2022-06-02 DIAGNOSIS — Z17.0 MALIGNANT NEOPLASM OF OVERLAPPING SITES OF RIGHT BREAST IN FEMALE, ESTROGEN RECEPTOR POSITIVE (HCC): ICD-10-CM

## 2022-06-02 DIAGNOSIS — C50.811 MALIGNANT NEOPLASM OF OVERLAPPING SITES OF RIGHT BREAST IN FEMALE, ESTROGEN RECEPTOR POSITIVE (HCC): ICD-10-CM

## 2022-06-02 DIAGNOSIS — Z51.81 ENCOUNTER FOR MONITORING CARDIOTOXIC DRUG THERAPY: Primary | ICD-10-CM

## 2022-06-02 DIAGNOSIS — R06.09 DOE (DYSPNEA ON EXERTION): ICD-10-CM

## 2022-06-02 DIAGNOSIS — Z79.899 ENCOUNTER FOR MONITORING CARDIOTOXIC DRUG THERAPY: Primary | ICD-10-CM

## 2022-06-02 DIAGNOSIS — T88.7XXA MEDICATION SIDE EFFECT: ICD-10-CM

## 2022-06-02 DIAGNOSIS — Z51.81 ENCOUNTER FOR MONITORING CARDIOTOXIC DRUG THERAPY: ICD-10-CM

## 2022-06-02 DIAGNOSIS — Z79.899 ENCOUNTER FOR MONITORING CARDIOTOXIC DRUG THERAPY: ICD-10-CM

## 2022-06-02 PROCEDURE — 93005 ELECTROCARDIOGRAM TRACING: CPT | Performed by: SPECIALIST

## 2022-06-02 PROCEDURE — 1090F PRES/ABSN URINE INCON ASSESS: CPT | Performed by: SPECIALIST

## 2022-06-02 PROCEDURE — G8400 PT W/DXA NO RESULTS DOC: HCPCS | Performed by: SPECIALIST

## 2022-06-02 PROCEDURE — 93356 MYOCRD STRAIN IMG SPCKL TRCK: CPT | Performed by: SPECIALIST

## 2022-06-02 PROCEDURE — G9899 SCRN MAM PERF RSLTS DOC: HCPCS | Performed by: SPECIALIST

## 2022-06-02 PROCEDURE — 1101F PT FALLS ASSESS-DOCD LE1/YR: CPT | Performed by: SPECIALIST

## 2022-06-02 PROCEDURE — 93308 TTE F-UP OR LMTD: CPT | Performed by: SPECIALIST

## 2022-06-02 PROCEDURE — G8427 DOCREV CUR MEDS BY ELIG CLIN: HCPCS | Performed by: SPECIALIST

## 2022-06-02 PROCEDURE — 1123F ACP DISCUSS/DSCN MKR DOCD: CPT | Performed by: SPECIALIST

## 2022-06-02 PROCEDURE — G9711 PT HX TOT COL OR COLON CA: HCPCS | Performed by: SPECIALIST

## 2022-06-02 PROCEDURE — 99214 OFFICE O/P EST MOD 30 MIN: CPT | Performed by: SPECIALIST

## 2022-06-02 PROCEDURE — G8536 NO DOC ELDER MAL SCRN: HCPCS | Performed by: SPECIALIST

## 2022-06-02 PROCEDURE — G8510 SCR DEP NEG, NO PLAN REQD: HCPCS | Performed by: SPECIALIST

## 2022-06-02 PROCEDURE — 93010 ELECTROCARDIOGRAM REPORT: CPT | Performed by: SPECIALIST

## 2022-06-02 PROCEDURE — G0463 HOSPITAL OUTPT CLINIC VISIT: HCPCS | Performed by: SPECIALIST

## 2022-06-02 PROCEDURE — G8420 CALC BMI NORM PARAMETERS: HCPCS | Performed by: SPECIALIST

## 2022-06-02 RX ORDER — ESTRADIOL 0.1 MG/G
CREAM VAGINAL
COMMUNITY
Start: 2022-05-19

## 2022-06-02 RX ORDER — TRIAMCINOLONE ACETONIDE 1 MG/G
PASTE DENTAL AS NEEDED
COMMUNITY
Start: 2022-03-22

## 2022-06-02 RX ORDER — SULFAMETHOXAZOLE AND TRIMETHOPRIM 800; 160 MG/1; MG/1
TABLET ORAL AS NEEDED
COMMUNITY
Start: 2022-03-17

## 2022-06-02 NOTE — Clinical Note
6/2/2022    Patient: Elana Triplett   YOB: 1955   Date of Visit: 6/2/2022     Laurence Catherine MD  [facility]    Dear Laurence Catherine MD,      Thank you for referring Ms. Melissa Llamas to [facility] for evaluation. My notes for this consultation are attached. If you have questions, please do not hesitate to call me. I look forward to following your patient along with you.       Sincerely,    Kandice Peters MD

## 2022-06-02 NOTE — PROGRESS NOTES
Slava Albrecht     1955       Heaven Graff MD, [facility]  Date of Visit-6/2/2022   PCP is Shahid Roblero MD   [facility]  [facility]  HPI:  Slava Albrecht is a 74 y.o. female   11 month f/u. · Echo on 8/23/21 showed a normal EF. The global strain was normal at -15. · Pt with previous stress echo in 2013 that was normal.   ·  on herceptin therapy. ·  cervical cancer in 1983, colon cancer in 2008, now breast cancer. · She had a lobectomy on April 1st, 2021. · Visit 9/7, previously had an echo with chemo that showed reduction in the GLS. We repeated her echo and there was a further reduction in the EF. We have decided to start carvedilol around the 10th of September, the EF was 52%. She had some itching around the eyelids.      Says her rash \"comes and goes\" eye dermatitis saw Dr Jagdish Monzon  Limited echo last visit shows EF 59%. Pt was finishing up her XRT, saw Dr. Chrissy Calhoun before last visit. She is seeing Dermatology. Visit with Oncology 11/17/21 is reviewed. Today. .. Echo today for chemo f/u shows EF 57% and GLS of -18.1. She takes metoprolol 25 mg daily. Saw Dr. Davi Carrera with oncology on 4/14/22. She has completed her Taxol and septum 9/8/21. Overall the pt states she is doing well. She notes she had a recent mammogram that turned out good. Denies chest pain, edema, syncope or shortness of breath at rest, has no tachycardia, palpitations or sense of arrhythmia. EKG:   Sinus  Rhythm   -Nonspecific ST depression  -Nondiagnostic. Assessment/Plan:     Patient Instructions   We will see you back for an annual follow up with a limited echocardiogram.       1. Encounter for monitoring cardiotoxic drug therapy  Echo in September shown reduction in GLS and drop in EF. Placed on carvedilol. Because of rash, placed on Toprol. Feeling great today. Echo shows normal EF and GLS. Off Herceptin since September.  We will see her back in 1 year with echo, sooner if sx's.   06/02/22    ECHO ADULT FOLLOW-UP OR LIMITED 06/07/2022 6/7/2022    Interpretation Summary    Left Ventricle: Normal left ventricular systolic function. EF by 2D Simpsons Biplane is 57%. Left ventricle size is normal. Normal wall thickness. GLS Today: 6/2/2022 = -18.1%, 3/11/2022 = -18.4%, 12/03/2021 = -16.9%, 10/08/2021 = -16.0%, 9/10/2021 = -16.3%, 8/23/2021 = -15.4%. Normal wall motion. 2. Malignant neoplasm of overlapping sites of right breast in female, estrogen receptor positive ([facility]  Has finished XRT and had Port Removal.  3. Medication side effect  Her rash has resolved. Continues on low dose Toprol, seeing dermo. 4. VIVAR (dyspnea on exertion)  Asymptomatic, resolved. F/u in 1 year     Impression:   1. Encounter for monitoring cardiotoxic drug therapy    2. Malignant neoplasm of overlapping sites of right breast in female, estrogen receptor positive ([facility]    3. Medication side effect    4. VIVAR (dyspnea on exertion)       Cardiac History:   No specialty comments available. Future Appointments   Date Time Provider Joseph Workman   6/15/2022  1:45 PM Miranda Glynn PT [facility]   7/14/2022 11:00 AM Faizan Chand  [address] BS AMB   11/21/2022  9:00 AM GLENDY MRI 1 [facility] JOHN SHONDA   6/5/2023  9:00 AM JOHN HI BS AMB   6/5/2023 10:00 AM Heaven Parker MD CAVREY BS AMB        Patient Care Team:  Nikkie Hall MD as PCP - General (Family Medicine)  Nikkie Hall MD (Family Medicine)  Michael Luna DO as Consulting Provider (Hematology and Oncology)  Sera Lara MD as Physician (Palliative Medicine)  Lidia Jackson MD (Cardiovascular Disease Physician)    ROS-except as noted above. . A complete cardiac and respiratory are reviewed and negative except as above ; Resp-denies wheezing  or productive cough,.  Const- No unusual weight loss or fever; Neuro-no recent seizure or CVA ; GI- No BRBPR, abdom pain, bloating ; - no  hematuria   see supplement sheet, initialed and to be scanned by staff  Past Medical History:   Diagnosis Date    Breast cancer ([facility] 04/21/2021    Right    Cancer ([facility]     cervical and colon    Cervical cancer ([facility] 1983    Colon cancer ([facility] 2008    Radiation therapy complication       Social Hx= reports that she has never smoked. She has never used smokeless tobacco. She reports that she does not drink alcohol and does not use drugs. Exam and Labs:  /82 (BP 1 Location: Right arm, BP Patient Position: Sitting, BP Cuff Size: Adult)   Pulse 76   Resp 16   Ht 5' 5\" (1.651 m)   Wt 146 lb (66.2 kg)   SpO2 97%   BMI 24.30 kg/m² Constitutional:  NAD, comfortable  Head: NC,AT. Eyes: No scleral icterus. Neck:  Neck supple. No JVD present. Throat: moist mucous membranes. Chest: Effort normal & normal respiratory excursion . Neurological: alert, conversant and oriented . Skin: Skin is not cold. No obvious systemic rash noted. Not diaphoretic. No erythema. Psychiatric:  Grossly normal mood and affect. Behavior appears normal. Extremities:  no clubbing or cyanosis. Abdomen: non distended    Lungs:breath sounds normal. No stridor. distress, wheezes or  Rales. Heart: normal rate, regular rhythm, normal S1, S2, no murmurs, rubs, clicks or gallops , PMI non displaced. Edema: Edema is none.   Lab Results   Component Value Date/Time    Cholesterol, total 273 (H) 04/05/2022 09:51 AM    HDL Cholesterol 54 04/05/2022 09:51 AM    LDL, calculated 193 (H) 04/05/2022 09:51 AM    Triglyceride 142 04/05/2022 09:51 AM     Lab Results   Component Value Date/Time    Sodium 142 12/02/2021 10:01 AM    Potassium 4.8 12/02/2021 10:01 AM    Chloride 105 12/02/2021 10:01 AM    CO2 26 12/02/2021 10:01 AM    Anion gap 6 09/15/2021 09:27 AM    Glucose 95 12/02/2021 10:01 AM    BUN 15 12/02/2021 10:01 AM    Creatinine 0.90 12/02/2021 10:01 AM    BUN/Creatinine ratio 17 12/02/2021 10:01 AM    GFR est AA 76 12/02/2021 10:01 AM    GFR est non-AA 66 12/02/2021 10:01 AM    Calcium 9.6 12/02/2021 10:01 AM      Wt Readings from Last 3 Encounters:   04/14/22 146 lb (66.2 kg)   04/05/22 145 lb (65.8 kg)   03/11/22 149 lb (67.6 kg)      BP Readings from Last 3 Encounters:   04/14/22 132/79   04/05/22 110/70   03/11/22 100/60      Current Outpatient Medications   Medication Sig    cholecalciferol (VITAMIN D3) (50,000 UNITS /1250 MCG) capsule TAKE 1 CAPSULE BY MOUTH EVERY SEVEN (7) DAYS.  anastrozole (ARIMIDEX) 1 mg tablet Take 1 mg by mouth daily.  metoprolol succinate (TOPROL-XL) 25 mg XL tablet Take 1 Tablet by mouth daily.  Tobradex ophthalmic ointment     mometasone (ELOCON) 0.1 % topical cream     sertraline (ZOLOFT) 50 mg tablet TAKE ONE TABLET BY MOUTH EVERY DAY     No current facility-administered medications for this visit. Impression see above.       Written by Radhika Sanabria, as dictated by Lebron Au MD.

## 2022-06-07 LAB
ECHO AO ASC DIAM: 3.6 CM
ECHO AO ASCENDING AORTA INDEX: 2.08 CM/M2
ECHO AO ROOT DIAM: 3.1 CM
ECHO AO ROOT INDEX: 1.79 CM/M2
ECHO LA DIAMETER INDEX: 1.68 CM/M2
ECHO LA DIAMETER: 2.9 CM
ECHO LA TO AORTIC ROOT RATIO: 0.94
ECHO LA VOL 2C: 22 ML (ref 22–52)
ECHO LA VOL 4C: 26 ML (ref 22–52)
ECHO LA VOL BP: 26 ML (ref 22–52)
ECHO LA VOL/BSA BIPLANE: 15 ML/M2 (ref 16–34)
ECHO LA VOLUME AREA LENGTH: 28 ML
ECHO LA VOLUME INDEX A2C: 13 ML/M2 (ref 16–34)
ECHO LA VOLUME INDEX A4C: 15 ML/M2 (ref 16–34)
ECHO LA VOLUME INDEX AREA LENGTH: 16 ML/M2 (ref 16–34)
ECHO LV EDV A2C: 48 ML
ECHO LV EDV A4C: 49 ML
ECHO LV EDV BP: 49 ML (ref 56–104)
ECHO LV EDV INDEX A4C: 28 ML/M2
ECHO LV EDV INDEX BP: 28 ML/M2
ECHO LV EDV NDEX A2C: 28 ML/M2
ECHO LV EJECTION FRACTION A2C: 60 %
ECHO LV EJECTION FRACTION A4C: 54 %
ECHO LV EJECTION FRACTION BIPLANE: 57 % (ref 55–100)
ECHO LV ESV A2C: 19 ML
ECHO LV ESV A4C: 23 ML
ECHO LV ESV BP: 21 ML (ref 19–49)
ECHO LV ESV INDEX A2C: 11 ML/M2
ECHO LV ESV INDEX A4C: 13 ML/M2
ECHO LV ESV INDEX BP: 12 ML/M2
ECHO LV FRACTIONAL SHORTENING: 39 % (ref 28–44)
ECHO LV INTERNAL DIMENSION DIASTOLE INDEX: 2.54 CM/M2
ECHO LV INTERNAL DIMENSION DIASTOLIC: 4.4 CM (ref 3.9–5.3)
ECHO LV INTERNAL DIMENSION SYSTOLIC INDEX: 1.56 CM/M2
ECHO LV INTERNAL DIMENSION SYSTOLIC: 2.7 CM
ECHO LV IVSD: 0.9 CM (ref 0.6–0.9)
ECHO LV MASS 2D: 128 G (ref 67–162)
ECHO LV MASS INDEX 2D: 74 G/M2 (ref 43–95)
ECHO LV POSTERIOR WALL DIASTOLIC: 0.9 CM (ref 0.6–0.9)
ECHO LV RELATIVE WALL THICKNESS RATIO: 0.41

## 2022-06-15 ENCOUNTER — APPOINTMENT (OUTPATIENT)
Dept: PHYSICAL THERAPY | Age: 67
End: 2022-06-15

## 2022-06-27 ENCOUNTER — APPOINTMENT (OUTPATIENT)
Dept: PHYSICAL THERAPY | Age: 67
End: 2022-06-27

## 2022-07-13 ENCOUNTER — APPOINTMENT (OUTPATIENT)
Dept: PHYSICAL THERAPY | Age: 67
End: 2022-07-13

## 2022-07-14 ENCOUNTER — OFFICE VISIT (OUTPATIENT)
Dept: ONCOLOGY | Age: 67
End: 2022-07-14
Payer: MEDICARE

## 2022-07-14 VITALS
WEIGHT: 146 LBS | DIASTOLIC BLOOD PRESSURE: 80 MMHG | RESPIRATION RATE: 18 BRPM | OXYGEN SATURATION: 96 % | BODY MASS INDEX: 24.3 KG/M2 | TEMPERATURE: 98.6 F | SYSTOLIC BLOOD PRESSURE: 128 MMHG | HEART RATE: 87 BPM

## 2022-07-14 DIAGNOSIS — Z17.0 MALIGNANT NEOPLASM OF OVERLAPPING SITES OF RIGHT BREAST IN FEMALE, ESTROGEN RECEPTOR POSITIVE (HCC): Primary | ICD-10-CM

## 2022-07-14 DIAGNOSIS — C50.811 MALIGNANT NEOPLASM OF OVERLAPPING SITES OF RIGHT BREAST IN FEMALE, ESTROGEN RECEPTOR POSITIVE (HCC): Primary | ICD-10-CM

## 2022-07-14 PROCEDURE — G9711 PT HX TOT COL OR COLON CA: HCPCS | Performed by: INTERNAL MEDICINE

## 2022-07-14 PROCEDURE — G8427 DOCREV CUR MEDS BY ELIG CLIN: HCPCS | Performed by: INTERNAL MEDICINE

## 2022-07-14 PROCEDURE — G0463 HOSPITAL OUTPT CLINIC VISIT: HCPCS | Performed by: INTERNAL MEDICINE

## 2022-07-14 PROCEDURE — 1090F PRES/ABSN URINE INCON ASSESS: CPT | Performed by: INTERNAL MEDICINE

## 2022-07-14 PROCEDURE — 1123F ACP DISCUSS/DSCN MKR DOCD: CPT | Performed by: INTERNAL MEDICINE

## 2022-07-14 PROCEDURE — G8536 NO DOC ELDER MAL SCRN: HCPCS | Performed by: INTERNAL MEDICINE

## 2022-07-14 PROCEDURE — G9899 SCRN MAM PERF RSLTS DOC: HCPCS | Performed by: INTERNAL MEDICINE

## 2022-07-14 PROCEDURE — 99213 OFFICE O/P EST LOW 20 MIN: CPT | Performed by: INTERNAL MEDICINE

## 2022-07-14 PROCEDURE — G8400 PT W/DXA NO RESULTS DOC: HCPCS | Performed by: INTERNAL MEDICINE

## 2022-07-14 PROCEDURE — G8420 CALC BMI NORM PARAMETERS: HCPCS | Performed by: INTERNAL MEDICINE

## 2022-07-14 PROCEDURE — G8432 DEP SCR NOT DOC, RNG: HCPCS | Performed by: INTERNAL MEDICINE

## 2022-07-14 PROCEDURE — 1101F PT FALLS ASSESS-DOCD LE1/YR: CPT | Performed by: INTERNAL MEDICINE

## 2022-07-14 NOTE — PROGRESS NOTES
[facility] [place] [address], [address] [place]: [phone]  F: [phone]    Reason for Visit:   Samir Bacon is a 74 y.o. female who is seen today in office for follow up of Right Breast Cancer     Treatment History:   · Breast biopsy  · Lumpectomy 4/21/21 at [facility]  · Adjuvant weekly Taxol/Herceptin x 12 weeks from 6/16/21 - 9/8/21  · Taxol DR to 60 mg/m2 on Day 8 Cycle 3 due to side effects  stopped 9/15/21. Radiation. on AI. STAGE:  · T2N0 ER+ HER2 +  · MYRISK negative    History of Present Illness: Samir Bacon is a 74 y.o. female seen today in office for follow up of right breast cancer ER+ HER2+ hx of lumpectomy  Doing well overall. Stopped AI about a week ago due to hand pain. Hot flashes better since stopping. mammo good  No fevers/ chills/ chest pain/ SOB/ nausea/ vomiting/diarrhea/ pain/fatigue            Last visit:  Tolerating AI with some hot flashes. Not too bad. Has list of questions today. Is on thyroid/ cholesterol med/ vit D. Depression on med. On biotin for nails. Got lichens planus and on creams for this. Dx from PT. Saw GYN. Low sex drive. Also saw dentist.   Healthy overall. Feels fine today.     No fevers/ chills/ chest pain/ SOB/ nausea/ vomiting/diarrhea/ pain/fatigue           Past Medical History:   Diagnosis Date    Breast cancer ([address] 04/21/2021    Right    Cancer [facility]     cervical and colon    Cervical cancer ([facility] 1983    Colon cancer [facility]) 2008    Radiation therapy complication       Past Surgical History:   Procedure Laterality Date    ENDOSCOPY, COLON, DIAGNOSTIC      HX BREAST BIOPSY Right 2021      Social History     Tobacco Use    Smoking status: Never Smoker    Smokeless tobacco: Never Used   Substance Use Topics    Alcohol use: Never      Family History   Problem Relation Age of Onset    Cancer Mother     Breast Cancer Mother     Cancer Father     Melanoma Father     Breast Cancer Sister     Colon Cancer Brother     Prostate Cancer Brother     Heart Disease Mother     Hypertension Sister      Current Outpatient Medications   Medication Sig    estradioL (ESTRACE) 0.01 % (0.1 mg/gram) vaginal cream INSERT 1 GRAM VAGINALLY ONCE A WEEK    cholecalciferol (VITAMIN D3) (50,000 UNITS /1250 MCG) capsule TAKE 1 CAPSULE BY MOUTH EVERY SEVEN (7) DAYS.  metoprolol succinate (TOPROL-XL) 25 mg XL tablet Take 1 Tablet by mouth daily.  Tobradex ophthalmic ointment     mometasone (ELOCON) 0.1 % topical cream     sertraline (ZOLOFT) 50 mg tablet TAKE ONE TABLET BY MOUTH EVERY DAY    atorvastatin (LIPITOR) 10 mg tablet TAKE 1 TABLET BY MOUTH EVERY DAY (Patient not taking: Reported on 7/14/2022)    trimethoprim-sulfamethoxazole (BACTRIM DS, SEPTRA DS) 160-800 mg per tablet as needed. (Patient not taking: Reported on 7/14/2022)    triamcinolone acetonide (KENALOG) 0.1 % dental paste as needed. (Patient not taking: Reported on 7/14/2022)    anastrozole (ARIMIDEX) 1 mg tablet Take 1 mg by mouth daily. (Patient not taking: Reported on 7/14/2022)     No current facility-administered medications for this visit. Allergies   Allergen Reactions    Cipro [Ciprofloxacin Hcl] Nausea and Vomiting    Benadryl Allergy Decongestant Nausea and Vomiting    Ciprofloxacin Nausea and Vomiting      Review of Systems:  A complete review of systems was obtained, negative except as described above and as reported on ROS sheet scanned into system.      Physical Exam:     Visit Vitals  /80 (BP 1 Location: Right upper arm, BP Patient Position: Sitting)   Pulse 87   Temp 98.6 °F (37 °C) (Temporal)   Resp 18   Wt 146 lb (66.2 kg)   SpO2 96%   BMI 24.30 kg/m²     ECOG PS: 0  General: No distress  Eyes: Anicteric sclerae  HENT: Atraumatic, wearing a mask  Neck: Supple  Respiratory: CTAB, normal respiratory effort  CV: Normal rate, regular rhythm, no murmurs, no peripheral edema  GI: Soft, nontender, nondistended, no masses  MS: Normal gait and station. Digits without clubbing or cyanosis. Skin: No rashes, ecchymoses, or petechiae. Normal temperature, turgor, and texture. Psych: Alert, oriented, appropriate affect, normal judgment/insight  Neuro: Non focal  Breast no masses palpable b/l     Results:     Lab Results   Component Value Date/Time    WBC 5.2 12/02/2021 10:01 AM    HGB 14.3 12/02/2021 10:01 AM    HCT 42.4 12/02/2021 10:01 AM    PLATELET 551 42/63/5678 10:01 AM    MCV 93 12/02/2021 10:01 AM    ABS. NEUTROPHILS 3.3 12/02/2021 10:01 AM    HGB (POC) 14.3 09/20/2016 09:28 AM    HCT (POC) 43.1 09/20/2016 09:28 AM     Lab Results   Component Value Date/Time    Sodium 142 12/02/2021 10:01 AM    Potassium 4.8 12/02/2021 10:01 AM    Chloride 105 12/02/2021 10:01 AM    CO2 26 12/02/2021 10:01 AM    Glucose 95 12/02/2021 10:01 AM    BUN 15 12/02/2021 10:01 AM    Creatinine 0.90 12/02/2021 10:01 AM    GFR est AA 76 12/02/2021 10:01 AM    GFR est non-AA 66 12/02/2021 10:01 AM    Calcium 9.6 12/02/2021 10:01 AM     Lab Results   Component Value Date/Time    Bilirubin, total 0.2 12/02/2021 10:01 AM    ALT (SGPT) 16 12/02/2021 10:01 AM    Alk. phosphatase 95 12/02/2021 10:01 AM    Protein, total 7.0 12/02/2021 10:01 AM    Albumin 4.4 12/02/2021 10:01 AM    Globulin 3.1 09/15/2021 09:27 AM     All reports from outside facility scanned into media    08/23/21    ECHO ADULT COMPLETE 08/23/2021 8/23/2021    Interpretation Summary  · LV: Estimated LVEF is 55 - 60%. Normal cavity size, wall thickness and systolic function (ejection fraction normal). Normal left ventricular strain. Global longitudinal strain is -15.4%. Mild (grade 1) left ventricular diastolic dysfunction. Signed by: Montez Wolfe MD on 8/23/2021  1:06 PM    Records reviewed and summarized above. Pathology report(s) reviewed above. Radiology report(s) reviewed above.     Assessment:/PLAN     1) Stage 2 RIGHT Breast Cancer ER+ Her2+ post Lumpectomy 4/21/21  She started weekly Taxol/Herceptin on 6/16/21. Stopped TH 9/21 due to possible cardiac issues. Following with cardio. Seen today for fu. Clinically pt doing well overall. Did radiation   on adjuvant AI and stopped due to hand arthralgia. Off a week. Stay off for a month. Then call us with symptoms. mammo good 5/22. Continue MRI also fo 11/22. Labs and routine HM with PCP. 2) Hx of Colon Cancer (2008) and Cervical Cancer (7674)  She did not have chemo. On a course of surveillance. 3)  Lichens planus/ vaginal dryness. Seeing GYN. Low dose vaginal estrogen    4) thyroid/ high cholesterol. Per PCP. 5) low EF on therapy. Seeing cardio. 6) Psychosocial  Mood good. Coping well. She has good family support. SW/NN support as needed. Has supportive . Call if questions. Follow up in 3 months    I appreciate the opportunity to participate in Ms. Sebastián Stacy's care.     Signed By: Kavin Bolton,

## 2022-07-14 NOTE — PROGRESS NOTES
Zonia Estrada is a 74 y.o. female  Chief Complaint   Patient presents with    Follow-up     Right Breast Cancer      1. Have you been to the ER, urgent care clinic since your last visit? Hospitalized since your last visit? No    2. Have you seen or consulted any other health care providers outside of the [address] [place] [address] since your last visit? Include any pap smears or colon screening.  No

## 2022-08-12 ENCOUNTER — DOCUMENTATION ONLY (OUTPATIENT)
Dept: ONCOLOGY | Age: 67
End: 2022-08-12

## 2022-08-12 DIAGNOSIS — C50.811 MALIGNANT NEOPLASM OF OVERLAPPING SITES OF RIGHT BREAST IN FEMALE, ESTROGEN RECEPTOR POSITIVE (HCC): Primary | ICD-10-CM

## 2022-08-12 DIAGNOSIS — Z17.0 MALIGNANT NEOPLASM OF OVERLAPPING SITES OF RIGHT BREAST IN FEMALE, ESTROGEN RECEPTOR POSITIVE (HCC): Primary | ICD-10-CM

## 2022-08-12 NOTE — PROGRESS NOTES
UPDATE:  MA has faxed Lymphedema Referral to the [address] [place] [address] office fax number: [phone]. Fax confirmation was received back! Referral in progress. ..   Don Jose

## 2022-08-16 ENCOUNTER — OFFICE VISIT (OUTPATIENT)
Dept: PALLATIVE CARE | Age: 67
End: 2022-08-16

## 2022-08-16 DIAGNOSIS — M25.50 ARTHRALGIA, UNSPECIFIED JOINT: Primary | ICD-10-CM

## 2022-08-16 PROCEDURE — 97810 ACUP 1/> WO ESTIM 1ST 15 MIN: CPT | Performed by: PHYSICAL MEDICINE & REHABILITATION

## 2022-08-16 NOTE — PROGRESS NOTES
Palliative Medicine and Integrative Medicine Acupuncture Note    Date Current Visit: 8/16/2022 VISIT 7  Date Initial Visit: 8/17/21  Requesting Physician: Mario Soria    Summary: Anxiety about cancer tx    Subjective: Adonis Barrios is a 74 y.o. female w/ a hx of cervical cancer in 1983, colon cancer in 2008 and R breast cancer s/p lumpectomy 4/2021 at [facility] and receiving adjuvant chemo w/ Dr Mario Soria- Taxol/Herceptin. With her other cancer dx, did not require chemo or radiation- so more anxious about the treatment than the actual cancer dx. Tolerating tx well- very mild nausea, no neuropathy, and keeping her energy up w/ good diet and regular exercise. At times at night feels like her \"whole body has restless leg syndrome\"     Has very supportive family including a niece in the area who had breast cancer in 2019 and went to acupuncturist Missy Duncan. She is seeing John Edmondson for meditation from the [facility] and a Reiki master. Social: From [place], went to school at [employer]. Supportive . Has a dtr and son- one in [place] and the other in [place]. 4 siblings- cancer runs in their family but so does longevity. Likes to stay active and bike ride. INTERVAL HX  ~~~~~~~~~~~    8/16/22- Comes into due to b/l hand pain that started >1.5 months ago- woke up one morning w/ aching pain in b/l MCPs paulie digits 3-4. Saw Ortho hand and had injection on R for dupuytren's contracture which improved ROM but was told the pain was not due to this. Off Arimidex since 7/5/22 w/out improvement in sx, so to restart that medication soon. Also w/ intermittent R knee pain, no injury or swelling. Still trying to be active but has not been bike riding due to pains. Also has been able to travel- went to [facility] with her dtr for mother's day. Working on giving back to the community in [place] that has a cancer dx.      2/8/22- Cont to have issues w/ pruritic rash on chest wall and back as well as blepharitis but just finishing up ointment from ophthalmologist. Utilizing all services that [facility] has to offer in order to focus on the future and being a survivor. Also notices diarrhea intermittently , to bring up w/ dietician. 12/7/21- Completed radiation, to start anastrazole soon. Has itching and tightness of R breast, some discomfort when itches. Also has itching all over body at times- takes Claritin daily, has seen dermatologist. Anna Buchanan overwhelmed w/ all the medications she has to take compared to before her CA dx.     11/16/21- Last session had itchy eyelids which she attributed to [address]. Now on metoprolol, since then has had slightly pruritic rash on legs and chest- saw Derm while in [place] (was there to support her dtr going through surgery) and had a topical steroid prescribed. Still present. Energy fair. 10/5/21- Pt had port taken out and started on Coreg by Cardiology. However soon after both events has had significant pruritis of b/l eye lids w/ swelling and tearing. Dr Sami Steward stopped the Coreg for 7-10 days. Pt was her ophthalmologist at [facility] yesterday, started on an abx ointment. Also feeling incr stressors, worries and feeling \"frazzled\"- for example, forgot her wallet when getting gas. 9/21/21- Pt w/ mult stressors, feels like she is always going to the doctors. Recent echo w/ Dr Sami Steward w/ EF 52% (down from 60%)- Herceptin on hold. Was worried, as received Herceptin while repeat echo was pending. Some fatigue and diarrhea. Functional status remains good- rides bike, does sit ups and weights for arms daily.          Past Medical History:   Past Medical History:   Diagnosis Date    Breast cancer ([address] 04/21/2021    Right    Cancer ([address]     cervical and colon    Cervical cancer ([address] 1983    Colon cancer ([address] 2008    Radiation therapy complication         Past Surgical history:   Past Surgical History:   Procedure Laterality Date    ENDOSCOPY, COLON, DIAGNOSTIC      HX BREAST BIOPSY Right 2021 Family History:. Family History   Problem Relation Age of Onset    Cancer Mother     Breast Cancer Mother     Cancer Father     Melanoma Father     Breast Cancer Sister     Colon Cancer Brother     Prostate Cancer Brother     Heart Disease Mother     Hypertension Sister          ROS:   ROS    The following systems were reviewed: constitutional, ears/nose/mouth/throat, respiratory, gastrointestinal, musculoskeletal, neurologic, psychiatric, endocrine. Positive findings noted below. Per above     Social history:   Social History     Socioeconomic History    Marital status:      Spouse name: Not on file    Number of children: Not on file    Years of education: Not on file    Highest education level: Not on file   Occupational History    Not on file   Tobacco Use    Smoking status: Never    Smokeless tobacco: Never   Vaping Use    Vaping Use: Never used   Substance and Sexual Activity    Alcohol use: Never    Drug use: Never    Sexual activity: Never   Other Topics Concern    Not on file   Social History Narrative    ** Merged History Encounter **          Social Determinants of Health     Financial Resource Strain: Not on file   Food Insecurity: Not on file   Transportation Needs: Not on file   Physical Activity: Not on file   Stress: Not on file   Social Connections: Not on file   Intimate Partner Violence: Not on file   Housing Stability: Not on file        Physical Exam:         Objective:     General appearance:  No apparent distress, well nourished, well groomed   HEENT: nl sclera, nares clear, eye lids w/out inflammation, moist mucous membranes   Lungs: Unlabored   Skin: Warm, dry, erythema over chest   Psych:  Normal affect, appropriate   Msk Gait strong and balanced, grossly intact   No overt swelling in hands  No swelling or crepitus in R knee. Assessment and Plan:       ICD-10-CM ICD-9-CM    1.  Arthralgia, unspecified joint  M25.50 719.40            Treatment Plan:   -Goals: Improved pain, function   -Type of acupuncture and number of treatments planned   -Other modalities or referrals       Acupuncture treatment performed after written and verbal consent obtained. Patient aware that risk include, but are not limited to: pain during needle insertion; bleeding/bruising; infections; internal organ perforation. All questions were answered. Time out performed immediately prior to the start of the procedure. Patient identified by name and date of birth and agreement on procedure being performed was verified. ~~~~~~~ ~~~~~~~~~~~~~~~~~~~~    *Tolerates needling well  *Uses donut hole of table  *Likes fan and music   *After first tx felt \"lighter\"    1. General well being :     -GV 20, LI4, LR3    2. Arthralgias: Do not seem to be directly related to arimidex as did not improve when off of it for 6 weeks. Has seen Ortho hand. -B/l Everardo Coffey 2-5 (TTP but tolerated)  -LI4 (as above), TH5  -R SP10, LR8, SP9, ST34, GB33, ST36 w/ 2 leads, 4 Hz x 20 min         ~~~~~~~~~~~~~~~~~~~~~~~~~~~    Pt tolerated procedure well without apparent complications. Pt advised to avoid strenuous sexual activity, exercise, heavy meals, alcohol for the next 24 hours. Aware that rebound effect may occur following treatment but that should improve back to baseline in several days.

## 2022-08-17 NOTE — PROGRESS NOTES
UPDATE:  Per MA reviewing patients chart, Patient is now scheduled to see Barb Enriquez with Physical Therapy on 9/21/22! Physical Therapy Referral is now CLOSED.   Rudi Doran

## 2022-09-01 ENCOUNTER — TELEPHONE (OUTPATIENT)
Dept: ONCOLOGY | Age: 67
End: 2022-09-01

## 2022-09-01 DIAGNOSIS — M79.605 LEFT LEG PAIN: Primary | ICD-10-CM

## 2022-09-01 NOTE — TELEPHONE ENCOUNTER
1013 Pt IDx2  Patient given # [phone] via Cassy W Felix Pacheco as requested  to schedule her doppler. Patient is on her way to get a cortisone shot in other hand and music therapy. Advise patient to not wait too long to schedule her doppler and she agreed .

## 2022-09-03 ENCOUNTER — HOSPITAL ENCOUNTER (OUTPATIENT)
Dept: VASCULAR SURGERY | Age: 67
Discharge: HOME | End: 2022-09-03
Attending: REGISTERED NURSE
Payer: MEDICARE

## 2022-09-03 DIAGNOSIS — M79.605 LEFT LEG PAIN: ICD-10-CM

## 2022-09-03 PROCEDURE — 93971 EXTREMITY STUDY: CPT

## 2022-09-06 NOTE — PROGRESS NOTES
Physical Therapy at [facility],   a part of  [facility]  [address]  Phone: [phone] Fax: [phone]      Discharge Summary 2-15    Patient name: Tatianna Brenner  : 1955  Provider#: [id number]  Referral source: Sera Pryor NP      Medical/Treatment Diagnosis: Mixed incontinence [N39.46]     Prior Hospitalization: see medical history     Comorbidities: See Plan of Care  Prior Level of Function: See Plan of Care  Medications: Verified on Patient Summary List    Start of Care: 3/2/2022     Onset Date:    Visits from Start of Care: 4    Missed Visits: 2  Reporting Period : 3/2/2022 - 2022    Assessment/Summary of care:  Mrs Parvez Choudhury was referred to pelvic PT for evaluation and treatment of dyspareunia, perineal and labial pain, pelvic floor ms dysfunction since , recent history of treatment for cancer. Treatment consisted of manual therapy, therapeutic exercise, therapeutic activity, bladder health education, bowel habit education, pelvic floor training, home exercise program development and progression. Mrs Parvez Choudhury made limited progress due to significant perineum and labial tissue discomfort, likely due low estrogen state. Additionally she was discovered to have a flare of lichens sclerosis. With her agreement PT was placed on hold for 1 month on  in order to allow her to start topical estrogen and address LS flare in the hopes that she would better tolerate PT. She was also referred to urogynocology to assess palpable mesh edge along her vaginal wall that reproduced her chief complaint pain with IC. She did not return following her visit 22, cancelled her 6/15/22 and 22 appointments. Current functional status is unknown. Goal status on 22 is as follows:     Short Term Goals:  To be accomplished in 6 weeks:  Patient will be independent with a progressive home exercise program  MET  Patient will demonstrate & utilized pelvic floor ms protection techniques MET  Patient will complete internal pelvic exam PERFECT score testing. MET  Patient will demonstrate PFM resting tone 3mV or less on sEMG biofeedback assessment      Not initiated due to pain  Patient will be independent with a progressive vaginal dilator therapy program    Not initiated due to pain  Patient will be independent with progressive body scan relaxation program Not initiated     Long Term Goals: To be accomplished in 12 weeks:  Patient will demonstrate 3mV relaxation between [facility] efforts on sEMG biofeedback. Patient will report no pain with bathing and wiping. MET  Patient will have no pain with intercourse.   NT        RECOMMENDATIONS:  [x]Discontinue therapy: []Patient has reached or is progressing toward set goals     []Patient is non-compliant or has abdicated     []Due to lack of appreciable progress towards set goals     [x]Other:  Pt has self-discharged    Kunal Gu PT, MSPT    9/6/2022

## 2022-09-06 NOTE — PROGRESS NOTES
[facility] Verified. Called pt on mobile phone number listed. Patient was made aware of her recent US showing no blood clot/good. Patient verbalized understanding and wanted MA to ask Provider that she is really wanting to know what else she can do about her leg pain, she states the only thing that seems to be helping is heat and does not want to take anymore medication due to believing the meds are what causes her issues. Patient also mentioned she has been doing her own research and want to know what is the percentage benefit she has being on Anastrozole and wanting to know percentage of it coming back. Patient is also requesting to be rescheduled for a sooner appt!   Emily Rajput

## 2022-09-21 ENCOUNTER — APPOINTMENT (OUTPATIENT)
Dept: PHYSICAL THERAPY | Age: 67
End: 2022-09-21

## 2022-09-23 ENCOUNTER — HOSPITAL ENCOUNTER (OUTPATIENT)
Dept: PHYSICAL THERAPY | Age: 67
Discharge: HOME | End: 2022-09-23
Payer: MEDICARE

## 2022-09-23 VITALS — WEIGHT: 148 LBS | HEIGHT: 65 IN | BODY MASS INDEX: 24.66 KG/M2

## 2022-09-23 PROCEDURE — 97161 PT EVAL LOW COMPLEX 20 MIN: CPT

## 2022-09-23 PROCEDURE — 97140 MANUAL THERAPY 1/> REGIONS: CPT

## 2022-09-23 NOTE — PROGRESS NOTES
PT/OT LYMPHEDEMA INITIAL EVALUATION NOTE - [facility] 2-15    Patient Name: Jean Rosales  Date:2022  : 1955  [x]  Patient  Verified  Payor: Delroy Alt / Plan: [place] MED[place] PART [place] & B / Product Type: Medicare /    In time: 1:40 pm  Out time: 2:40 pm  Total Treatment Time (min): 60  Total Timed Codes (min): 35  1:1 Treatment Time ([facility] only): 35   Visit #: 1     Treatment Area: Malignant neoplasm of overlapping sites of right female breast [C50.811]  Estrogen receptor positive status (ER+) [Z17.0]    SUBJECTIVE  Pain Level (0-10 scale): tenderness R breast inferior to nipple, pain L lower leg anteriorly. Any medication changes, allergies to medications, adverse drug reactions, diagnosis change, or new procedure performed?: [] No    [x] Yes (see summary sheet for update)  Subjective:    Patient had surgery for R breast cancer and reports tenderness at the site of the scar. She has not noticed any swelling in the arms but the R arm will feel heavy at times. Function: Independent with ambulation and self care. Enjoys riding a bicycle. Mechanism of Injury: Patient had a R breast lumpectomy 2021 as well as chemotherapy and radiation therapy for R breast cancer. She was treated at the Lymphedema Clinic at [facility] for axillary web symptoms approximately one year ago. She was educated in a home exercise program and obtained compression products to wear with high risk activities. Patient is here today with complaints of R breast tenderness at site of surgical incision.     PMHx/Surgical Hx:   Past Surgical History:   Procedure Laterality Date    ENDOSCOPY, COLON, DIAGNOSTIC      HX BREAST BIOPSY Right      Past Medical History:   Diagnosis Date    Breast cancer ([address] 2021    Right    Cancer ([address]     cervical and colon    Cervical cancer ([address]     Colon cancer ([address] 2008    Radiation therapy complication      Work Hx: Retired  Living Situation: Lives with  in a single family house. Pt Goals: lessen pain and numbness R breast  Barriers: None  Motivation: Good  Cognition: A & O x 3      Sleeping Arrangement:  in bed at night  Compression/Lymphedema Equipment: compression arm sleeve and hand piece of unknown brand or level compression - patient did not bring products to the clinic today. Foam padding- kidney shaped per patient    LLIS Score: scores a 5 with 7% impairment    OBJECTIVE/EXAMINATION    Skin and Tissue Assessment:  Dermal Status: Intact; scar s/p R breast lumpectomy    Texture/Consistency: Boggy/brawny at incision site of R breast inferior to nipple from 5 to 7' O clock    Pigmentation/Color Change: Normal    Anomalies: N/A    Circulatory: Vascular studies ruled out DVT in past L LE 9/3/22    Nails: Normal    Stemmers Sign: Negative    Height:  Height: 5' 5\" (165.1 cm)  Weight:  Weight: 67.1 kg (148 lb)   BMI:  BMI (calculated): 24.6  (36 or greater: adversely affecting lymphedema)  Volumetric Measurements:   Right:  2,110.53 mL Left:  2,080. 14 mL   % Difference: 1.46% R UE > L UE Dominance: R hand dominant       0 min Therapeutic Exercise:  [] See flow sheet : Patient performs exercises and stretches learned during treatment a the [employer]. Patient is active and enjoys riding a bicycle. Rationale: increase ROM and facilitate the muscle pump function  to improve the patients ability to perform ADL's with ease. 35 min Manual Therapy: Patient educated in the self MLD packet today with focus on the R breast as well as gentle fibrotic techniques for scar mobilization. Although no significant swelling is apparent the texture of the R breast inferior to nipple softened with MLD. Provided patient with the written instructions to follow. Discussed performing self MLD with bathing and skin care.  Patient obtained the following compression products during treatment at another clinic: one compression arm sleeve and hand piece of unknown brand or level of compression, one kidney shaped foam pad, and one chip foam bag. Discussed wearing the arm sleeve and hand piece with high risk activities, such as exercise, repetitive activity, travel, and also when the arm feels heavy. Patient no longer has the chip foam bag. Provided patient with a chip foam bag as well as [employer] 2 (8' X 12\") and educated in proper use and wear schedule of the products. Recommended that patient wear one of the foam products in conjunction with a compression shirt or sports bra for a few hours each day and/or nightly as tolerated. Rationale: increase tissue extensibility to improve the patients ability to reduce tenderness and progression of swelling during the restorative phase of care. With   [] TE   [] TA   [x] MT   [] SC   [] other: Patient Education: [x] Review HEP    [] MLD Patient Education Reviewed with patient, as well as demonstration and instruction during MLD portion of the session. Continued education in self MLD technique with bathing and skin care. Provided patient with the written instructions to follow. [] Progressed/Changed HEP based on:   [x] positioning   [] Kinesiotape   [x] Skin care   [] wound care   [] other:   [x] Home program/use of compression products  Patient / caregiver re-demonstrated bandaging. [] Yes  [] No  Compression bandaging/garment precautions reviewed: [] Yes  [] No        Pain Level (0-10 scale) post treatment: No reports of pain. ASSESSMENT/Changes in Function: Patient is a 74year old female seen for after care following surgery and treatment for R breast cancer. Patient's medical history also includes: colon cancer 2008 and cervical cancer 1983. Patient was treated at a different Lymphedema Clinic approximately one year ago for axillary web symptoms. Patient did well with therapy and is able to demonstrate full UE range of motion. Full UE volumes taken today reveal a low percentage difference of less than 2%.  Patient is here today with complaints of tenderness and lack of tissue mobilization at the lumpectomy site. Initiated education in self MLD and gentle fibrotic techniques and patient was pleased with the softening of the R breast as well as increased mobility of scar. Continued education in the role and benefit of compression products and discussed compression options. Patient prefers to work on her home program and initiate use of compression products again and will call or return to the clinic with any further issues.      [x]  See Plan of [address], PT, CLT 9/23/2022

## 2022-09-23 NOTE — PROGRESS NOTES
Outpatient Lymphedema Clinic  a part of [address] [place] [address], [address] [facility], [facility], [address]  Phone: [phone]  Fax: [phone]    Plan of Care/Statement of Necessity for Physical Therapy/Occupational Therapy Services  2-15    Patient name: Wyatt Gant  : 1955  Provider#: [id number]  Referral source: Bigg Centeno NP      Medical/Treatment Diagnosis: Malignant neoplasm of overlapping sites of right female breast [C50.811]  Estrogen receptor positive status (ER+) [Z17.0]     Prior Hospitalization: see medical history     Comorbidities: Cervical Cancer , Colon Cancer , R breast cancer s/p lumpectomy with chemotherapy and radiation therapy ,   Prior Level of Function: Independent with ambulation and self care. Enjoys riding a bicycle. Medications: Verified on Patient Summary List  Start of Care: 22      Onset Date: 2021   The [address] [place] [address] and following information is based on the information from the initial evaluation. Assessment/ key information: Patient is a 74year old female seen for after care following surgery and treatment for R breast cancer. Patient's medical history also includes: colon cancer  and cervical cancer . Patient was treated at a different Lymphedema Clinic approximately one year ago for axillary web symptoms. Patient did well with therapy and is able to demonstrate full UE range of motion. Full UE volumes taken today reveal a low percentage difference of less than 2%. Patient is here today with complaints of tenderness and lack of tissue mobilization at the lumpectomy site. Initiated education in self MLD and gentle fibrotic techniques and patient was pleased with the softening of the R breast as well as increased mobility of scar. Continued education in the role and benefit of compression products and discussed compression options.  Patient prefers to work on her home program and initiate use of compression products again and will call or return to the clinic with any further issues. Evaluation Complexity History MEDIUM  Complexity : 1-2 comorbidities / personal factors will impact the outcome/ POC ; Examination LOW Complexity : 1-2 Standardized tests and measures addressing body structure, function, activity limitation and / or participation in recreation  ;Presentation LOW Complexity : Stable, uncomplicated  ;Clinical Decision Making LOW Complexity : FOTO score of   Overall Complexity Rating: LOW     Problem List: R breast tenderness and immobilization of scar  Treatment Plan may include any combination of the following: Other: manual lymphatic drainage, manual therapy, therapeutic exercise, assessment of compression product needs, patient education  Patient / Family readiness to learn indicated by: asking questions, trying to perform skills, and interest  Persons(s) to be included in education: patient   Barriers to Learning/Limitations: None  Patient Goal (s): lessen pain and numbness R breast  Rehabilitation Potential: good    Goals: To be met by 12/19/2022  1. Pt will show improvement in the Lymphedema Life Impact Scale by decreasing the score to 3 and thus allow improvement in patient's quality of life. 2.  Patient will be independent with don/doff of compression system and use in order to prevent reaccumulation of fluid/adhesions at discharge. 3.  Pt will be independent in self-MLD and scar mobilization techniques and demonstrate readiness to transition to independent restorative phase of lymphedema therapy. Frequency / Duration: Patient to be seen 1 to 3 visits over the next 12 weeks or on an as needed basis.      Patient/ Caregiver education and instruction: other compression product options, manual lymphatic drainage, home program    [x]  Plan of care has been reviewed with PTA    Certification Period: 9/23/2022-12/19/2022    Fatou Dias, PT, CLT  9/23/2022 ________________________________________________________________________    I certify that the above Therapy Services are being furnished while the patient is under my care. I agree with the treatment plan and certify that this therapy is necessary.     [de-identified] Signature:____________________  Date:____________Time: _________         Felicita Beltrán NP

## 2022-09-26 ENCOUNTER — PATIENT MESSAGE (OUTPATIENT)
Dept: ONCOLOGY | Age: 67
End: 2022-09-26

## 2022-09-26 DIAGNOSIS — C50.811 MALIGNANT NEOPLASM OF OVERLAPPING SITES OF RIGHT BREAST IN FEMALE, ESTROGEN RECEPTOR POSITIVE (HCC): ICD-10-CM

## 2022-09-26 DIAGNOSIS — Z17.0 MALIGNANT NEOPLASM OF OVERLAPPING SITES OF RIGHT BREAST IN FEMALE, ESTROGEN RECEPTOR POSITIVE (HCC): ICD-10-CM

## 2022-09-26 DIAGNOSIS — Z79.811 USE OF ANASTROZOLE (ARIMIDEX): ICD-10-CM

## 2022-11-07 RX ORDER — ANASTROZOLE 1 MG/1
1 TABLET ORAL DAILY
Qty: 90 TABLET | Refills: 3 | Status: SHIPPED | OUTPATIENT
Start: 2022-11-07

## 2022-11-07 NOTE — TELEPHONE ENCOUNTER
Oral Chemotherapy     Steffi Agrawal is a  69 y. o.female  diagnosed with breast cancer . Ms. Shaun Enriquez is being treated with anastrozole.      Medication name: anastrozole    Dose:  1 mg   Frequency: daily    Ordering provider: Amelie Doan DO  Start date: Dec 2021        Refill anastrozole sent to [address] [place] [address], [facility], [facility], June [address] in place: Yes  Recommendation Provided To: Patient/Caregiver: 1 via [address] [place] [address] Detail: Refill(s) Provided    Intervention Accepted By: Patient/Caregiver: 1  Time Spent (min): 10

## 2022-11-07 NOTE — TELEPHONE ENCOUNTER
From: Mauro Healy  To: Rowdy Gray  Sent: 9/26/2022 4:55 PM EDT  Subject: Anastrozole    Hi Luz -     Checking in to see how you are doing without the Anastrozole for the last 2 weeks. Are things the same, worse, better?     Kaden Wheeler

## 2022-11-15 ENCOUNTER — OFFICE VISIT (OUTPATIENT)
Dept: ONCOLOGY | Age: 67
End: 2022-11-15
Payer: MEDICARE

## 2022-11-15 VITALS
SYSTOLIC BLOOD PRESSURE: 106 MMHG | OXYGEN SATURATION: 96 % | DIASTOLIC BLOOD PRESSURE: 71 MMHG | TEMPERATURE: 98.1 F | HEART RATE: 72 BPM | BODY MASS INDEX: 24.74 KG/M2 | WEIGHT: 148.5 LBS | HEIGHT: 65 IN

## 2022-11-15 DIAGNOSIS — C50.811 MALIGNANT NEOPLASM OF OVERLAPPING SITES OF RIGHT BREAST IN FEMALE, ESTROGEN RECEPTOR POSITIVE (HCC): Primary | ICD-10-CM

## 2022-11-15 DIAGNOSIS — Z79.811 USE OF ANASTROZOLE (ARIMIDEX): ICD-10-CM

## 2022-11-15 DIAGNOSIS — Z17.0 MALIGNANT NEOPLASM OF OVERLAPPING SITES OF RIGHT BREAST IN FEMALE, ESTROGEN RECEPTOR POSITIVE (HCC): Primary | ICD-10-CM

## 2022-11-15 PROCEDURE — G0463 HOSPITAL OUTPT CLINIC VISIT: HCPCS | Performed by: INTERNAL MEDICINE

## 2022-11-15 RX ORDER — LEVOTHYROXINE SODIUM 50 UG/1
TABLET ORAL
COMMUNITY
Start: 2022-07-05

## 2022-11-15 NOTE — LETTER
11/15/2022    Patient: Anjelica New   YOB: 1955   Date of Visit: 11/15/2022     Ryland Snellen, MD  [address] [place] [address]  [address] [facility] MD Mary Jane  [address] [place] [address]    Dear Ryland Snellen, MD Belia Collie, MD,      Thank you for referring Ms. Marcus Bryant to [address] [place] [address] for evaluation. My notes for this consultation are attached. If you have questions, please do not hesitate to call me. I look forward to following your patient along with you.       Sincerely,    Cortney Ridley, DO

## 2022-11-15 NOTE — PROGRESS NOTES
Karen Wu is a 69 y.o. female  Chief Complaint   Patient presents with    Follow-up    Breast Cancer     1. Have you been to the ER, urgent care clinic since your last visit? Hospitalized since your last visit? No    2. Have you seen or consulted any other health care providers outside of the [address] [place] [address] since your last visit? Include any pap smears or colon screening.  No

## 2022-11-15 NOTE — PROGRESS NOTES
[facility] at [place] [address] [address], [address] [address], [address] [place] [address]  Leodan Brumfieldlier: [phone]  F: [phone]    Reason for Visit:   Hiral Sinclair is a 69 y.o. female who is seen today in office for follow up of Right Breast Cancer     Treatment History:   Breast biopsy  Lumpectomy 4/21/21 at [facility]  Adjuvant weekly Taxol/Herceptin x 12 weeks from 6/16/21 - 9/8/21  Taxol DR to 60 mg/m2 on Day 8 Cycle 3 due to side effects  stopped 9/15/21. Radiation. on AI. STAGE:  T2N0 ER+ HER2 +  MYRISK negative    History of Present Illness: Hiral Sinclair is a 69 y.o. female seen today in office for follow up of right breast cancer ER+ HER2+ hx of lumpectomy  Doing well overall. Tolerating AI fine. mammo good 5/22 and for MRI. For 11/22. No fevers/ chills/ chest pain/ SOB/ nausea/ vomiting/diarrhea/ pain/fatigue         Past Medical History:   Diagnosis Date    Breast cancer ([address] 04/21/2021    Right    Cancer ([address]     cervical and colon    Cervical cancer ([address] 1983    Colon cancer ([address] 2008    Radiation therapy complication       Past Surgical History:   Procedure Laterality Date    ENDOSCOPY, COLON, DIAGNOSTIC      HX BREAST BIOPSY Right 2021      Social History     Tobacco Use    Smoking status: Never    Smokeless tobacco: Never   Substance Use Topics    Alcohol use: Never      Family History   Problem Relation Age of Onset    Cancer Mother     Breast Cancer Mother     Cancer Father     Melanoma Father     Breast Cancer Sister     Colon Cancer Brother     Prostate Cancer Brother     Heart Disease Mother     Hypertension Sister      Current Outpatient Medications   Medication Sig    levothyroxine (SYNTHROID) 50 mcg tablet TAKE 1 TABLET BY MOUTH DAILY (BEFORE BREAKFAST) FOR 30 DAYS. anastrozole (ARIMIDEX) 1 mg tablet Take 1 mg by mouth daily.     estradioL (ESTRACE) 0.01 % (0.1 mg/gram) vaginal cream INSERT 1 GRAM VAGINALLY ONCE A WEEK    cholecalciferol (VITAMIN D3) (50,000 UNITS /1250 MCG) capsule TAKE 1 CAPSULE BY MOUTH EVERY SEVEN (7) DAYS. Tobradex ophthalmic ointment     mometasone (ELOCON) 0.1 % topical cream     sertraline (ZOLOFT) 50 mg tablet TAKE ONE TABLET BY MOUTH EVERY DAY    atorvastatin (LIPITOR) 10 mg tablet TAKE 1 TABLET BY MOUTH EVERY DAY (Patient not taking: No sig reported)    trimethoprim-sulfamethoxazole (BACTRIM DS, SEPTRA DS) 160-800 mg per tablet as needed. (Patient not taking: No sig reported)    triamcinolone acetonide (KENALOG) 0.1 % dental paste as needed. (Patient not taking: No sig reported)    metoprolol succinate (TOPROL-XL) 25 mg XL tablet Take 1 Tablet by mouth daily. (Patient not taking: Reported on 11/15/2022)     No current facility-administered medications for this visit. Allergies   Allergen Reactions    Cipro [Ciprofloxacin Hcl] Nausea and Vomiting    Benadryl Allergy Decongestant Nausea and Vomiting    Ciprofloxacin Nausea and Vomiting      Review of Systems:  A complete review of systems was obtained, negative except as described above and as reported on ROS sheet scanned into system. Physical Exam:     Visit Vitals  /71 (BP 1 Location: Left upper arm, BP Patient Position: Sitting)   Pulse 72   Temp 98.1 °F (36.7 °C) (Temporal)   Ht 5' 5\" (1.651 m)   Wt 148 lb 8 oz (67.4 kg)   SpO2 96%   BMI 24.71 kg/m²     ECOG PS: 0  General: No distress  Eyes: Anicteric sclerae  HENT: Atraumatic, wearing a mask  Neck: Supple  Respiratory: CTAB, normal respiratory effort  CV: Normal rate, regular rhyth  GI: Soft, nontender, nondistended, no masses  MS: Normal gait and station. Digits without clubbing or cyanosis. Skin: No rashes, ecchymoses, or petechiae. Normal temperature, turgor, and texture.    Psych: Alert, oriented, appropriate affect, normal judgment/insight  Neuro: Non focal  Breast no masses palpable b/l     Results:     Lab Results   Component Value Date/Time    WBC 5.2 12/02/2021 10:01 AM    HGB 14.3 12/02/2021 10:01 AM    HCT 42.4 12/02/2021 10:01 AM    PLATELET 584 71/75/5718 10:01 AM    MCV 93 12/02/2021 10:01 AM    ABS. NEUTROPHILS 3.3 12/02/2021 10:01 AM    HGB (POC) 14.3 09/20/2016 09:28 AM    HCT (POC) 43.1 09/20/2016 09:28 AM     Lab Results   Component Value Date/Time    Sodium 142 12/02/2021 10:01 AM    Potassium 4.8 12/02/2021 10:01 AM    Chloride 105 12/02/2021 10:01 AM    CO2 26 12/02/2021 10:01 AM    Glucose 95 12/02/2021 10:01 AM    BUN 15 12/02/2021 10:01 AM    Creatinine 0.90 12/02/2021 10:01 AM    GFR est AA 76 12/02/2021 10:01 AM    GFR est non-AA 66 12/02/2021 10:01 AM    Calcium 9.6 12/02/2021 10:01 AM     Lab Results   Component Value Date/Time    Bilirubin, total 0.2 12/02/2021 10:01 AM    ALT (SGPT) 16 12/02/2021 10:01 AM    Alk. phosphatase 95 12/02/2021 10:01 AM    Protein, total 7.0 12/02/2021 10:01 AM    Albumin 4.4 12/02/2021 10:01 AM    Globulin 3.1 09/15/2021 09:27 AM     All reports from outside facility scanned into media    08/23/21    ECHO ADULT COMPLETE 08/23/2021 8/23/2021    Interpretation Summary  · LV: Estimated LVEF is 55 - 60%. Normal cavity size, wall thickness and systolic function (ejection fraction normal). Normal left ventricular strain. Global longitudinal strain is -15.4%. Mild (grade 1) left ventricular diastolic dysfunction. Signed by: Hailey Worley MD on 8/23/2021  1:06 PM    Records reviewed and summarized above. Pathology report(s) reviewed above. Radiology report(s) reviewed above. Assessment:/PLAN     1) Stage 2 RIGHT Breast Cancer ER+ Her2+ post Lumpectomy 4/21/21  She started weekly Taxol/Herceptin on 6/16/21. Stopped TH 9/21 due to possible cardiac issues. Following with cardio. Seen today for fu. Clinically pt doing well overall. Did radiation   on adjuvant AI and tolerating with manageable side effects. mammo good 5/22. Continue MRI for 11/22. Labs and routine HM with PCP.       2) Hx of Colon Cancer (2008) and Cervical Cancer (8611)  She did not have chemo.  On a course of surveillance. 3)  Lichens planus/ vaginal dryness. Seeing GYN. Low dose vaginal estrogen    4) thyroid/ high cholesterol. Per PCP. 5) low EF on therapy. Seeing cardio. 6) Psychosocial  Mood good. Coping well. Rides horses  She has good family support. SW/NN support as needed. Has supportive . Call if questions. Follow up in 6 months, 3 months    I appreciate the opportunity to participate in Ms. Patricio Stacy's care.     Signed By: Simi Byrd,

## 2022-12-01 ENCOUNTER — HOSPITAL ENCOUNTER (OUTPATIENT)
Dept: MRI IMAGING | Age: 67
Discharge: HOME | End: 2022-12-01
Attending: NURSE PRACTITIONER
Payer: MEDICARE

## 2022-12-01 DIAGNOSIS — Z98.890 HISTORY OF LUMPECTOMY OF RIGHT BREAST: ICD-10-CM

## 2022-12-01 DIAGNOSIS — C50.811 MALIGNANT NEOPLASM OF OVERLAPPING SITES OF RIGHT BREAST IN FEMALE, ESTROGEN RECEPTOR POSITIVE (HCC): ICD-10-CM

## 2022-12-01 DIAGNOSIS — Z17.0 MALIGNANT NEOPLASM OF OVERLAPPING SITES OF RIGHT BREAST IN FEMALE, ESTROGEN RECEPTOR POSITIVE (HCC): ICD-10-CM

## 2022-12-01 PROCEDURE — 77049 MRI BREAST C-+ W/CAD BI: CPT

## 2022-12-01 PROCEDURE — 74011250636 HC RX REV CODE- 250/636

## 2022-12-01 PROCEDURE — 74011000258 HC RX REV CODE- 258: Performed by: NURSE PRACTITIONER

## 2022-12-01 PROCEDURE — A9576 INJ PROHANCE MULTIPACK: HCPCS

## 2022-12-01 PROCEDURE — 74011000250 HC RX REV CODE- 250: Performed by: NURSE PRACTITIONER

## 2022-12-01 RX ORDER — SODIUM CHLORIDE 0.9 % (FLUSH) 0.9 %
10 SYRINGE (ML) INJECTION
Status: COMPLETED | OUTPATIENT
Start: 2022-12-01 | End: 2022-12-01

## 2022-12-01 RX ADMIN — GADOTERIDOL 13 ML: 279.3 INJECTION, SOLUTION INTRAVENOUS at 09:36

## 2022-12-01 RX ADMIN — SODIUM CHLORIDE, PRESERVATIVE FREE 10 ML: 5 INJECTION INTRAVENOUS at 09:38

## 2022-12-01 RX ADMIN — SODIUM CHLORIDE 100 ML: 9 INJECTION, SOLUTION INTRAVENOUS at 09:37

## 2022-12-08 NOTE — PROGRESS NOTES
[employer] Verified. Called pt on mobile phone number listed. Patient was made aware of most recent Breast MRI being negative/good per Provider reading. Patient verbalized understanding and expressed no question!   Ortiz Francis

## 2022-12-16 ENCOUNTER — TELEPHONE (OUTPATIENT)
Dept: ONCOLOGY | Age: 67
End: 2022-12-16

## 2022-12-16 NOTE — TELEPHONE ENCOUNTER
Nicolasa pt and left  for pt to r/t call to the office to discuss that she is supposed to be switching to the daily lower dose Vitamin D supplement from the weekly that we had been prescribing. Dagoberto, discussed this with her in January in a BookBag. She can take an OTC product that has 600 to 1000 units or (15 to 25 mcg) once a daily.

## 2023-01-03 ENCOUNTER — TRANSCRIBE ORDER (OUTPATIENT)
Dept: SCHEDULING | Age: 68
End: 2023-01-03

## 2023-01-03 DIAGNOSIS — R10.30 LOWER ABDOMINAL PAIN, UNSPECIFIED: ICD-10-CM

## 2023-01-03 DIAGNOSIS — R14.2 BELCHING SYMPTOM: ICD-10-CM

## 2023-01-03 DIAGNOSIS — R19.7 DIARRHEA, UNSPECIFIED: Primary | ICD-10-CM

## 2023-01-03 DIAGNOSIS — R10.84 GENERALIZED ABDOMINAL PAIN: ICD-10-CM

## 2023-01-03 DIAGNOSIS — K58.0 IRRITABLE BOWEL SYNDROME WITH DIARRHEA: ICD-10-CM

## 2023-01-03 DIAGNOSIS — Z85.038 PERSONAL HISTORY OF COLON CANCER: ICD-10-CM

## 2023-01-19 ENCOUNTER — PATIENT MESSAGE (OUTPATIENT)
Dept: ONCOLOGY | Age: 68
End: 2023-01-19

## 2023-01-24 ENCOUNTER — DOCUMENTATION ONLY (OUTPATIENT)
Dept: ONCOLOGY | Age: 68
End: 2023-01-24

## 2023-02-08 ENCOUNTER — HOSPITAL ENCOUNTER (OUTPATIENT)
Dept: RADIATION THERAPY | Age: 68
Discharge: HOME | End: 2023-02-08

## 2023-02-20 NOTE — PROGRESS NOTES
[facility] [place] [address], [address] Seal: [phone]  F: [phone]      Reason for Visit:   Sherin Patel is a 69 y.o. female who is seen by synchronous (real-time) audio-video technology for follow up of Right Breast Cancer     Treatment History:   Breast biopsy  Lumpectomy 4/21/21 at [facility]  Adjuvant weekly Taxol/Herceptin x 12 weeks from 6/16/21 - 9/8/21  Taxol DR to 60 mg/m2 on Day 8 Cycle 3 due to side effects  stopped 9/15/21. Radiation. stopped AI 12/22 due to side effects. STAGE:  T2N0 ER+ HER2 +  MYRISK negative    History of Present Illness: Sherin Patel is a 69 y.o. female seen today virtually per pt preference for follow up of right breast cancer ER+ HER2+ hx of lumpectomy  Doing well overall. Stopped AI since 12/22 due to diarrhea/ urgency. Still has trigger fingers. No fevers/ chills/ chest pain/ SOB/ nausea/ vomiting/diarrhea/ pain/fatigue         Past Medical History:   Diagnosis Date    Breast cancer ([address] 04/21/2021    Right    Cancer ([address]     cervical and colon    Cervical cancer ([address] 1983    Colon cancer ([facility] 2008    Radiation therapy complication       Past Surgical History:   Procedure Laterality Date    ENDOSCOPY, COLON, DIAGNOSTIC      HX BREAST BIOPSY Right 2021      Social History     Tobacco Use    Smoking status: Never    Smokeless tobacco: Never   Substance Use Topics    Alcohol use: Never      Family History   Problem Relation Age of Onset    Cancer Mother     Breast Cancer Mother     Cancer Father     Melanoma Father     Breast Cancer Sister     Colon Cancer Brother     Prostate Cancer Brother     Heart Disease Mother     Hypertension Sister      Current Outpatient Medications   Medication Sig    nirmatrelvir-ritonavir (PAXLOVID) 300 mg (150 mg x 2)-100 mg As dir    levothyroxine (SYNTHROID) 50 mcg tablet TAKE 1 TABLET BY MOUTH DAILY (BEFORE BREAKFAST) FOR 30 DAYS.     anastrozole (ARIMIDEX) 1 mg tablet Take 1 mg by mouth daily. atorvastatin (LIPITOR) 10 mg tablet TAKE 1 TABLET BY MOUTH EVERY DAY (Patient not taking: No sig reported)    estradioL (ESTRACE) 0.01 % (0.1 mg/gram) vaginal cream INSERT 1 GRAM VAGINALLY ONCE A WEEK    trimethoprim-sulfamethoxazole (BACTRIM DS, SEPTRA DS) 160-800 mg per tablet as needed. (Patient not taking: No sig reported)    triamcinolone acetonide (KENALOG) 0.1 % dental paste as needed. (Patient not taking: No sig reported)    cholecalciferol (VITAMIN D3) (50,000 UNITS /1250 MCG) capsule TAKE 1 CAPSULE BY MOUTH EVERY SEVEN (7) DAYS. metoprolol succinate (TOPROL-XL) 25 mg XL tablet Take 1 Tablet by mouth daily. (Patient not taking: Reported on 11/15/2022)    Tobradex ophthalmic ointment     mometasone (ELOCON) 0.1 % topical cream     sertraline (ZOLOFT) 50 mg tablet TAKE ONE TABLET BY MOUTH EVERY DAY     No current facility-administered medications for this visit. Allergies   Allergen Reactions    Cipro [Ciprofloxacin Hcl] Nausea and Vomiting    Benadryl Allergy Decongestant Nausea and Vomiting    Ciprofloxacin Nausea and Vomiting      Review of Systems:  A complete review of systems was obtained, negative except as described above     Physical Exam:     There were no vitals taken for this visit. General: alert, cooperative, no distress   Mental  status: normal mood, behavior, speech, dress, motor activity, and thought processes, able to follow commands   HENT: NCAT   Neck: no visualized mass   Resp: no respiratory distress   Neuro: no gross deficits   Skin: no discoloration or lesions of concern on visible areas   Psychiatric: normal affect, consistent with stated mood, no evidence of hallucinations       Due to this being a TeleHealth evaluation (During AIEWX-74 public health emergency), many elements of the physical examination are unable to be assessed.   Evaluation of the following organ systems was limited: Vitals/Constitutional/EENT/Resp/CV/GI//MS/Neuro/Skin/Heme-Lymph-Imm. Results:     Lab Results   Component Value Date/Time    WBC 5.2 12/02/2021 10:01 AM    HGB 14.3 12/02/2021 10:01 AM    HCT 42.4 12/02/2021 10:01 AM    PLATELET 911 92/78/7985 10:01 AM    MCV 93 12/02/2021 10:01 AM    ABS. NEUTROPHILS 3.3 12/02/2021 10:01 AM    HGB (POC) 14.3 09/20/2016 09:28 AM    HCT (POC) 43.1 09/20/2016 09:28 AM     Lab Results   Component Value Date/Time    Sodium 142 12/02/2021 10:01 AM    Potassium 4.8 12/02/2021 10:01 AM    Chloride 105 12/02/2021 10:01 AM    CO2 26 12/02/2021 10:01 AM    Glucose 95 12/02/2021 10:01 AM    BUN 15 12/02/2021 10:01 AM    Creatinine 0.90 12/02/2021 10:01 AM    GFR est AA 76 12/02/2021 10:01 AM    GFR est non-AA 66 12/02/2021 10:01 AM    Calcium 9.6 12/02/2021 10:01 AM     Lab Results   Component Value Date/Time    Bilirubin, total 0.2 12/02/2021 10:01 AM    ALT (SGPT) 16 12/02/2021 10:01 AM    Alk. phosphatase 95 12/02/2021 10:01 AM    Protein, total 7.0 12/02/2021 10:01 AM    Albumin 4.4 12/02/2021 10:01 AM    Globulin 3.1 09/15/2021 09:27 AM     All reports from outside facility scanned into media    08/23/21    ECHO ADULT COMPLETE 08/23/2021 8/23/2021    Interpretation Summary  · LV: Estimated LVEF is 55 - 60%. Normal cavity size, wall thickness and systolic function (ejection fraction normal). Normal left ventricular strain. Global longitudinal strain is -15.4%. Mild (grade 1) left ventricular diastolic dysfunction. Signed by: Jean-Claude Zamora MD on 8/23/2021  1:06 PM    Records reviewed and summarized above. Pathology report(s) reviewed above. Radiology report(s) reviewed above. Assessment:/PLAN     1) Stage 2 RIGHT Breast Cancer ER+ Her2+ post Lumpectomy 4/21/21  She started weekly Taxol/Herceptin on 6/16/21. Stopped TH 9/21 due to possible cardiac issues. Following with cardio. Did radiation. Seen today virtually per pt preference for fu.    Clinically pt doing well overall. Reviewed list of questions today. Saw Rad/onc 2/23.   stopped adjuvant AI due to side effects. Diarrhea which not likely related to AI. Saw GI. Having some arthralgia and possibly related. Went to ortho for hands. Reviewed adjuvant hormonal therapy overall today. Pt is ok with re trying anastrozole for a month and let us know how it goes. Can switch to another AI vs tamoxifen if not tolerating. mammo good 5/22. Ordered for this year. MRI good 12/22. Labs and routine HM with PCP. 2) Hx of Colon Cancer (2008)    She did not have chemo. On a course of surveillance. Diarrhea and Seeing GI      3)  Lichens planus/ vaginal dryness. Hx of cervical cancer 1983  Seeing GYN. 4) thyroid/ high cholesterol. Per PCP. 5) low EF on therapy. Seeing cardio. 6) hx of COVID recently. Per PCP. 7) Psychosocial  Mood good. Coping well. Rides horses  She has good family support. SW/NN support as needed. Has supportive . Call if questions. Follow up in 6 months    The patient was evaluated through a synchronous (real-time) audio-video encounter. The patient (or guardian if applicable) is aware that this is a billable service, which includes applicable co-pays. This Virtual Visit was conducted with patient's (and/or legal guardian's) consent. The visit was conducted pursuant to the emergency declaration under the [facility], [address] [place] [address] waVA Hospital authority and the [employer] and [employer] General Act. Patient identification was verified, and a caregiver was present when appropriate. The patient was located in a state where the provider was licensed to provide care. I appreciate the opportunity to participate in Ms. Bib Stacy's care.     Signed By: Greg Stanford DO

## 2023-02-21 ENCOUNTER — VIRTUAL VISIT (OUTPATIENT)
Dept: ONCOLOGY | Age: 68
End: 2023-02-21
Payer: MEDICARE

## 2023-02-21 DIAGNOSIS — Z85.038 HISTORY OF COLON CANCER: ICD-10-CM

## 2023-02-21 DIAGNOSIS — C50.811 MALIGNANT NEOPLASM OF OVERLAPPING SITES OF RIGHT BREAST IN FEMALE, ESTROGEN RECEPTOR POSITIVE (HCC): Primary | ICD-10-CM

## 2023-02-21 DIAGNOSIS — M25.50 ARTHRALGIA, UNSPECIFIED JOINT: ICD-10-CM

## 2023-02-21 DIAGNOSIS — Z17.0 MALIGNANT NEOPLASM OF OVERLAPPING SITES OF RIGHT BREAST IN FEMALE, ESTROGEN RECEPTOR POSITIVE (HCC): Primary | ICD-10-CM

## 2023-02-21 DIAGNOSIS — R92.2 DENSE BREAST TISSUE ON MAMMOGRAM: ICD-10-CM

## 2023-02-21 PROCEDURE — G9711 PT HX TOT COL OR COLON CA: HCPCS | Performed by: INTERNAL MEDICINE

## 2023-02-21 PROCEDURE — G8536 NO DOC ELDER MAL SCRN: HCPCS | Performed by: INTERNAL MEDICINE

## 2023-02-21 PROCEDURE — 1123F ACP DISCUSS/DSCN MKR DOCD: CPT | Performed by: INTERNAL MEDICINE

## 2023-02-21 PROCEDURE — 1090F PRES/ABSN URINE INCON ASSESS: CPT | Performed by: INTERNAL MEDICINE

## 2023-02-21 PROCEDURE — G8427 DOCREV CUR MEDS BY ELIG CLIN: HCPCS | Performed by: INTERNAL MEDICINE

## 2023-02-21 PROCEDURE — 1101F PT FALLS ASSESS-DOCD LE1/YR: CPT | Performed by: INTERNAL MEDICINE

## 2023-02-21 PROCEDURE — G0463 HOSPITAL OUTPT CLINIC VISIT: HCPCS | Performed by: INTERNAL MEDICINE

## 2023-02-21 PROCEDURE — G8432 DEP SCR NOT DOC, RNG: HCPCS | Performed by: INTERNAL MEDICINE

## 2023-02-21 PROCEDURE — 99213 OFFICE O/P EST LOW 20 MIN: CPT | Performed by: INTERNAL MEDICINE

## 2023-02-21 PROCEDURE — G8400 PT W/DXA NO RESULTS DOC: HCPCS | Performed by: INTERNAL MEDICINE

## 2023-02-21 PROCEDURE — G9899 SCRN MAM PERF RSLTS DOC: HCPCS | Performed by: INTERNAL MEDICINE

## 2023-02-21 PROCEDURE — G8420 CALC BMI NORM PARAMETERS: HCPCS | Performed by: INTERNAL MEDICINE

## 2023-04-13 ENCOUNTER — HOSPITAL ENCOUNTER (OUTPATIENT)
Age: 68
Setting detail: OUTPATIENT SURGERY
Discharge: HOME | End: 2023-04-13
Attending: INTERNAL MEDICINE | Admitting: INTERNAL MEDICINE
Payer: MEDICARE

## 2023-04-13 VITALS
TEMPERATURE: 98.3 F | HEIGHT: 65 IN | SYSTOLIC BLOOD PRESSURE: 105 MMHG | DIASTOLIC BLOOD PRESSURE: 79 MMHG | HEART RATE: 67 BPM | BODY MASS INDEX: 23.99 KG/M2 | WEIGHT: 144 LBS | OXYGEN SATURATION: 98 % | RESPIRATION RATE: 17 BRPM

## 2023-04-13 PROCEDURE — 88305 TISSUE EXAM BY PATHOLOGIST: CPT

## 2023-04-13 PROCEDURE — 76060000032 HC ANESTHESIA 0.5 TO 1 HR: Performed by: INTERNAL MEDICINE

## 2023-04-13 PROCEDURE — 77030021593 HC FCPS BIOP ENDOSC BSC -A: Performed by: INTERNAL MEDICINE

## 2023-04-13 PROCEDURE — 77030029384 HC SNR POLYP CAPTVR II BSC -B: Performed by: INTERNAL MEDICINE

## 2023-04-13 PROCEDURE — 76040000007: Performed by: INTERNAL MEDICINE

## 2023-04-13 PROCEDURE — 2709999900 HC NON-CHARGEABLE SUPPLY: Performed by: INTERNAL MEDICINE

## 2023-04-13 RX ORDER — EPINEPHRINE 0.1 MG/ML
1 INJECTION INTRACARDIAC; INTRAVENOUS
Status: DISCONTINUED | OUTPATIENT
Start: 2023-04-13 | End: 2023-04-13 | Stop reason: HOSPADM

## 2023-04-13 RX ORDER — SODIUM CHLORIDE 9 MG/ML
50 INJECTION, SOLUTION INTRAVENOUS CONTINUOUS
Status: DISCONTINUED | OUTPATIENT
Start: 2023-04-13 | End: 2023-04-13 | Stop reason: HOSPADM

## 2023-04-13 RX ORDER — DEXTROMETHORPHAN/PSEUDOEPHED 2.5-7.5/.8
1.2 DROPS ORAL
Status: DISCONTINUED | OUTPATIENT
Start: 2023-04-13 | End: 2023-04-13 | Stop reason: HOSPADM

## 2023-04-13 RX ORDER — MIDAZOLAM HYDROCHLORIDE 1 MG/ML
.25-1 INJECTION, SOLUTION INTRAMUSCULAR; INTRAVENOUS
Status: DISCONTINUED | OUTPATIENT
Start: 2023-04-13 | End: 2023-04-13 | Stop reason: HOSPADM

## 2023-04-13 RX ORDER — ATROPINE SULFATE 0.1 MG/ML
0.5 INJECTION INTRAVENOUS
Status: DISCONTINUED | OUTPATIENT
Start: 2023-04-13 | End: 2023-04-13 | Stop reason: HOSPADM

## 2023-04-13 RX ORDER — NALOXONE HYDROCHLORIDE 0.4 MG/ML
0.4 INJECTION, SOLUTION INTRAMUSCULAR; INTRAVENOUS; SUBCUTANEOUS
Status: DISCONTINUED | OUTPATIENT
Start: 2023-04-13 | End: 2023-04-13 | Stop reason: HOSPADM

## 2023-04-13 RX ORDER — FENTANYL CITRATE 50 UG/ML
100 INJECTION, SOLUTION INTRAMUSCULAR; INTRAVENOUS
Status: DISCONTINUED | OUTPATIENT
Start: 2023-04-13 | End: 2023-04-13 | Stop reason: HOSPADM

## 2023-04-13 RX ORDER — SODIUM CHLORIDE 0.9 % (FLUSH) 0.9 %
5-40 SYRINGE (ML) INJECTION AS NEEDED
Status: DISCONTINUED | OUTPATIENT
Start: 2023-04-13 | End: 2023-04-13 | Stop reason: HOSPADM

## 2023-04-13 RX ORDER — SODIUM CHLORIDE 0.9 % (FLUSH) 0.9 %
5-40 SYRINGE (ML) INJECTION EVERY 8 HOURS
Status: DISCONTINUED | OUTPATIENT
Start: 2023-04-13 | End: 2023-04-13 | Stop reason: HOSPADM

## 2023-04-13 RX ORDER — SODIUM PICOSULFATE, MAGNESIUM OXIDE, AND ANHYDROUS CITRIC ACID 10; 3.5; 12 MG/160ML; G/160ML; G/160ML
LIQUID ORAL
COMMUNITY
Start: 2023-04-10

## 2023-04-13 RX ORDER — FLUMAZENIL 0.1 MG/ML
0.2 INJECTION INTRAVENOUS
Status: DISCONTINUED | OUTPATIENT
Start: 2023-04-13 | End: 2023-04-13 | Stop reason: HOSPADM

## 2023-04-13 NOTE — PERIOP NOTES
Initial RN admission and assessment performed and documented in Endoscopy navigator. Patient evaluated by anesthesia in pre-procedure holding. All procedural vital signs, airway assessment, and level of consciousness information monitored and recorded by anesthesia staff on the anesthesia record. Specimens labeled and reviewed with physician. Report received from CRNA post procedure. Patient transported to recovery area by RN. Endoscope was pre-cleaned at bedside immediately following procedure by Lavella Halsted.

## 2023-04-13 NOTE — H&P
118 [facility].  217 [facility] 140 [facility], [address]  [phone]                                History and Physical     NAME: Irvin Rodríguez   :  1955   MRN:  [id number]     HPI:  The patient was seen and examined. Past Surgical History:   Procedure Laterality Date    ENDOSCOPY, COLON, DIAGNOSTIC      HX BREAST BIOPSY Right      Past Medical History:   Diagnosis Date    Breast cancer ([facility] 2021    Right    Cancer ([facility]     cervical and colon    Cervical cancer ([facility] 1983    Colon cancer ([facility]     Radiation therapy complication      Social History     Tobacco Use    Smoking status: Never    Smokeless tobacco: Never   Vaping Use    Vaping Use: Never used   Substance Use Topics    Alcohol use: Never    Drug use: Never     Allergies   Allergen Reactions    Cipro [Ciprofloxacin Hcl] Nausea and Vomiting    Benadryl Allergy Decongestant Nausea and Vomiting    Ciprofloxacin Nausea and Vomiting     Family History   Problem Relation Age of Onset    Cancer Mother     Breast Cancer Mother     Cancer Father     Melanoma Father     Breast Cancer Sister     Colon Cancer Brother     Prostate Cancer Brother     Heart Disease Mother     Hypertension Sister      No current facility-administered medications for this encounter. PHYSICAL EXAM:  General: WD, WN. Alert, cooperative, no acute distress    HEENT: NC, Atraumatic. PERRLA, EOMI. Anicteric sclerae. Lungs:  CTA Bilaterally. No Wheezing/Rhonchi/Rales. Heart:  Regular  rhythm,  No murmur, No Rubs, No Gallops  Abdomen: Soft, Non distended, Non tender. +Bowel sounds, no HSM  Extremities: No c/c/e  Neurologic:  CN 2-12 gi, Alert and oriented X 3. No acute neurological distress   Psych:   Good insight. Not anxious nor agitated. The heart, lungs and mental status were satisfactory for the administration of MAC sedation and for the procedure.       Mallampati score: 2     The patient was counseled at length about the risks of trudy Covid-19 in the domi-operative and post-operative states including the recovery window of their procedure. The patient was made aware that trudy Covid-19 after a surgical procedure may worsen their prognosis for recovering from the virus and lend to a higher morbidity and or mortality risk. The patient was given the options of postponing their procedure. All of the risks, benefits, and alternatives were discussed. The patient does wish to proceed with the procedure.       Assessment:   Abdominal pain , diarrhea    Plan:   Endoscopic procedure egd/colonoscopy  MAC sedation

## 2023-04-13 NOTE — DISCHARGE INSTRUCTIONS
118 [facility].  217 [facility] [address], [address]  [address] [facility]  [phone]  1955    It was my pleasure seeing you for your procedure. You will also receive a summary report with the findings from this procedure and any further recommendations. If you had polyps removed or biopsies taken during your procedure, you will receive a separate letter from me within the next 2 weeks. If you don't receive this letter or if you have any questions, please call my office [phone]. Please take note of the post procedure instructions listed below. Rory Liang,    Dr. Guaman Cool    These instructions give you information on caring for yourself after your procedure. Call your doctor if you have any problems or questions after your procedure. HOME CARE  Walk if you have belly cramping or gas. Walking will help get rid of the air and reduce the bloated feeling in your belly (abdomen). Your IV site (where you received drugs) may be tender to touch. Place warm towels on the site; keep your arm up on two pillows if you have any swelling or soreness in the area. You may shower. ACTIVITY:  Take frequent rest periods and move at a slower pace for the next 24 hours. .  You may resume your regular activity tomorrow if you are feeling back to normal.  Do not drive or ride a bicycle for at least 24 hours (because of the medicine (anesthesia) used during the test). Do not sign any important legal documents or use or operate any machinery for 24 hours  Do not take sleeping medicines/nerve drugs for 24 hours unless the doctor tells you. You can return to work/school tomorrow unless otherwise instructed. NUTRITION:  Drink plenty of fluids to keep your pee (urine) clear or pale yellow  Begin with a light meal and progress to your normal diet.  Heavy or fried foods are harder to digest and may make you feel sick to your stomach (nauseated). Once you are feeling back to normal, you may resume your normal diet as instructed by your doctor. Avoid alcoholic beverages for 24 hours or as instructed. IF YOU HAD BIOPSIES TAKEN OR POLYPS REMOVED DURING THE PROCEDURE:  For the next 7 days, avoid all non-steroidal antiinflammatory medications such as Ibuprofen, Motrin, Advil, Alleve, Triny-seltzer, Goody's powder, BC powder. If you do not have an heart condition that requires you to take a daily aspirin, you should avoid taking aspirin for 7 days. Eat a soft diet for 24 hours. Monitor your stools for any blood or dark black, tar-like, stools as this may be a sign of bleeding and if you see any blood, notify your doctor immediately. GET HELP RIGHT AWAY AND SEEK IMMEDIATE MEDICAL CARE IF:  You have more than a spotting of blood in your stool. You pass clumps of tissue (blood clots) or fill the toilet with blood. Your belly is painfully swollen or puffy (abdominal distention). You throw up (vomit). You have a fever. You have redness, pain or swelling at the IV site that last greater than two days. You have abdominal pain or discomfort that is severe or gets worse throughout the day. Post-procedure diagnosis:  Mild Gastritis  Colon Polyps    Post-procedure recommendations:      EGD Findings:  Esophagus:normal  Stomach:mild antral gastritis, biopsies   Duodenum/jejunum:normal mucosa, biopsied       Colon Findings:   Rectum: normal  Sigmoid: evidence of prior sigmoid colectomy with healthy appearing anastomosis, one 4 mm sessile polyp, removed with forceps  Descending Colon: normal  Transverse Colon: two sessile polyps (5 mm - 9 mm), removed with cold snare  Ascending Colon: normal  Cecum: normal  Terminal Ileum: normal    Recommendations:   - Await pathology. You should receive a letter within 2 weeks. - Resume normal medications.  - Recommend repeat colonoscopy in 2 years.        Learning About Coronavirus (COVID-19)  Coronavirus (COVID-19): Overview  What is coronavirus (NQYLY-36)? The coronavirus disease (COVID-19) is caused by a virus. It is an illness that was first found in [place], [place], in December 2019. It has since spread worldwide. The virus can cause fever, cough, and trouble breathing. In severe cases, it can cause pneumonia and make it hard to breathe without help. It can cause death. Coronaviruses are a large group of viruses. They cause the common cold. They also cause more serious illnesses like Middle East respiratory syndrome (MERS) and severe acute respiratory syndrome (SARS). COVID-19 is caused by a novel coronavirus. That means it's a new type that has not been seen in people before. This virus spreads person-to-person through droplets from coughing and sneezing. It can also spread when you are close to someone who is infected. And it can spread when you touch something that has the virus on it, such as a doorknob or a tabletop. What can you do to protect yourself from coronavirus (COVID-19)? The best way to protect yourself from getting sick is to: Avoid areas where there is an outbreak. Avoid contact with people who may be infected. Wash your hands often with soap or alcohol-based hand sanitizers. Avoid crowds and try to stay at least 6 feet away from other people. Wash your hands often, especially after you cough or sneeze. Use soap and water, and scrub for at least 20 seconds. If soap and water aren't available, use an alcohol-based hand . Avoid touching your mouth, nose, and eyes. What can you do to avoid spreading the virus to others? To help avoid spreading the virus to others:  Cover your mouth with a tissue when you cough or sneeze. Then throw the tissue in the trash. Use a disinfectant to clean things that you touch often. Stay home if you are sick or have been exposed to the virus. Don't go to school, work, or public areas.  And don't use public transportation. If you are sick:  Leave your home only if you need to get medical care. But call the doctor's office first so they know you're coming. And wear a face mask, if you have one. If you have a face mask, wear it whenever you're around other people. It can help stop the spread of the virus when you cough or sneeze. Clean and disinfect your home every day. Use household  and disinfectant wipes or sprays. Take special care to clean things that you grab with your hands. These include doorknobs, remote controls, phones, and handles on your refrigerator and microwave. And don't forget countertops, tabletops, bathrooms, and computer keyboards. When to call for help  Call 911 anytime you think you may need emergency care. For example, call if:  You have severe trouble breathing. (You can't talk at all.)  You have constant chest pain or pressure. You are severely dizzy or lightheaded. You are confused or can't think clearly. Your face and lips have a blue color. You pass out (lose consciousness) or are very hard to wake up. Call your doctor now if you develop symptoms such as:  Shortness of breath. Fever. Cough. If you need to get care, call ahead to the doctor's office for instructions before you go. Make sure you wear a face mask, if you have one, to prevent exposing other people to the virus. Where can you get the latest information? The following health organizations are tracking and studying this virus. Their websites contain the most up-to-date information. Mohsen Weiss also learn what to do if you think you may have been exposed to the virus. U.S. Centers for Disease Control and Prevention (CDC): The CDC provides updated news about the disease and travel advice. The website also tells you how to prevent the spread of infection. www.cdc.gov  World Health Organization [facility]): WHO offers information about the virus outbreaks.  WHO also has travel advice. www.who.int  Current as of: April 1, 2020 Content Version: 12.4  © 1380-3888 Healthwise, Incorporated. Care instructions adapted under license by your healthcare professional. If you have questions about a medical condition or this instruction, always ask your healthcare professional. [address] any warranty or liability for your use of this information.

## 2023-04-13 NOTE — PROCEDURES
118 [facility] [place].  217 [place] [address] [address], [address]  [phone]                           Colonoscopy and EGD Procedure Note      Indications:   History colon cancer 2008, multiple prior colon polyps, intermittent abdominal discomfort and fecal urgency       :  Homero Santa MD    Staff: Endoscopy Technician-1: Laverta Schilder  Endoscopy RN-1: Dylon Clark RN    Referring Provider: Flores Vann MD    Sedation:  MAC anesthesia    Procedure Details:  After informed consent was obtained with all risks and benefits of procedure explained and pre-operative exam completed, pt was placed in the left lateral decubitus position. Following sequential administration of sedation as per above, the gastroscope was inserted into the mouth and advanced under direct vision to second portion of the duodenum. A careful inspection was made as the gastroscope was withdrawn, including a retroflexed view of the proximal stomach; findings and interventions are described below. EGD Findings:  Esophagus:normal  Stomach:mild antral gastritis, biopsies   Duodenum/jejunum:normal mucosa, biopsied     EGD Interventions:  biopsies     The bed was then turned and upon sequential sedation as per above, a digital rectal exam was performed per below. The Olympus videocolonoscope was inserted in the rectum and carefully advanced to the terminal ileum. The quality of preparation was good. [place] Bowel Prep Score  3/3/3. The colonoscope was slowly withdrawn with careful evaluation between folds. Retroflexion in the rectum was performed.      Colon Findings:   Rectum: normal  Sigmoid: evidence of prior sigmoid colectomy with healthy appearing anastomosis, one 4 mm sessile polyp, removed with forceps  Descending Colon: normal  Transverse Colon: two sessile polyps (5 mm - 9 mm), removed with cold snare  Ascending Colon: normal  Cecum: normal  Terminal Ileum: normal    Colonoscopy Interventions:  polypectomy; random biopsies also obtained through normal appearing colonic mucosa           Specimens Removed:    ID Type Source Tests Collected by Time Destination   1 : duodenal bx Preservative Duodenum  Braden Dolan MD 4/13/2023 1043 Pathology   2 : gastric bx Preservative Gastric  Braden Dolan MD 4/13/2023 1043 Pathology   3 : right colon polyps Preservative   Braden Dolan MD 4/13/2023 1054 Pathology   4 : Left colon polyp Preservative   Braden Dolan MD 4/13/2023 1102 Pathology   5 : random colon bx Preservative   Braden Dolan MD 4/13/2023 1102 Pathology       Complications: None. EBL:  minimal     Impression:    See Postoperative diagnosis above    Recommendations:   - Await pathology. You should receive a letter within 2 weeks. - Resume normal medications.  - Recommend repeat colonoscopy in 2 years. Discharge Disposition:  Home in the company of a  when able to ambulate.     Jacy Horton MD  4/13/2023  11:13 AM

## 2023-04-22 ENCOUNTER — TRANSCRIBE ORDERS (OUTPATIENT)
Facility: HOSPITAL | Age: 68
End: 2023-04-22

## 2023-04-22 DIAGNOSIS — Z17.0 ESTROGEN RECEPTOR POSITIVE: ICD-10-CM

## 2023-04-22 DIAGNOSIS — C50.811 MALIGNANT NEOPLASM OF OVERLAPPING SITES OF RIGHT BREAST IN FEMALE, ESTROGEN RECEPTOR POSITIVE (HCC): Primary | ICD-10-CM

## 2023-04-22 DIAGNOSIS — Z17.0 MALIGNANT NEOPLASM OF OVERLAPPING SITES OF RIGHT BREAST IN FEMALE, ESTROGEN RECEPTOR POSITIVE (HCC): Primary | ICD-10-CM

## 2023-04-22 DIAGNOSIS — R92.2 DENSE BREAST TISSUE ON MAMMOGRAM: ICD-10-CM

## 2023-05-25 ENCOUNTER — HOSPITAL ENCOUNTER (OUTPATIENT)
Facility: HOSPITAL | Age: 68
Discharge: HOME | End: 2023-05-25
Payer: MEDICARE

## 2023-05-25 DIAGNOSIS — C50.811 MALIGNANT NEOPLASM OF OVERLAPPING SITES OF RIGHT BREAST IN FEMALE, ESTROGEN RECEPTOR POSITIVE (HCC): ICD-10-CM

## 2023-05-25 DIAGNOSIS — Z17.0 ESTROGEN RECEPTOR POSITIVE: ICD-10-CM

## 2023-05-25 DIAGNOSIS — Z17.0 MALIGNANT NEOPLASM OF OVERLAPPING SITES OF RIGHT BREAST IN FEMALE, ESTROGEN RECEPTOR POSITIVE (HCC): ICD-10-CM

## 2023-05-25 PROCEDURE — G0279 TOMOSYNTHESIS, MAMMO: HCPCS

## 2023-05-31 DIAGNOSIS — Z17.0 MALIGNANT NEOPLASM OF OVERLAPPING SITES OF RIGHT BREAST IN FEMALE, ESTROGEN RECEPTOR POSITIVE (HCC): Primary | ICD-10-CM

## 2023-05-31 DIAGNOSIS — C50.811 MALIGNANT NEOPLASM OF OVERLAPPING SITES OF RIGHT BREAST IN FEMALE, ESTROGEN RECEPTOR POSITIVE (HCC): Primary | ICD-10-CM

## 2023-05-31 RX ORDER — ANASTROZOLE 1 MG/1
1 TABLET ORAL DAILY
Qty: 90 TABLET | Refills: 2 | Status: SHIPPED | OUTPATIENT
Start: 2023-05-31

## 2023-06-11 NOTE — ADDENDUM NOTE
Addended by: Cody Patel on: 10/13/2021 03:43 PM     Modules accepted: Orders Plan d/w ACP Mehdi and VERONICA

## 2023-08-17 ENCOUNTER — PATIENT MESSAGE (OUTPATIENT)
Age: 68
End: 2023-08-17

## 2023-08-19 NOTE — PROGRESS NOTES
supportive . Call if questions. Follow up in 6 months    The patient was evaluated through a synchronous (real-time) audio-video encounter. The patient (or guardian if applicable) is aware that this is a billable service, which includes applicable co-pays. This Virtual Visit was conducted with patient's (and/or legal guardian's) consent. Patient identification was verified, and a caregiver was present when appropriate. The patient was located in a state where the provider was licensed to provide care. I appreciate the opportunity to participate in Ms. Edda Culp's care.     Signed By: Jose Barillas DO

## 2023-08-21 ENCOUNTER — TELEMEDICINE (OUTPATIENT)
Age: 68
End: 2023-08-21

## 2023-08-21 ENCOUNTER — TELEPHONE (OUTPATIENT)
Age: 68
End: 2023-08-21

## 2023-08-21 DIAGNOSIS — C50.811 MALIGNANT NEOPLASM OF OVERLAPPING SITES OF RIGHT FEMALE BREAST, UNSPECIFIED ESTROGEN RECEPTOR STATUS (HCC): Primary | ICD-10-CM

## 2023-08-21 NOTE — TELEPHONE ENCOUNTER
Call to patient, [phone], Patient ID verified X 2. Patient scheduled for Virtual Visit with Provider. Provider unable to complete visit today d/t in office technical issues, transferred to [facility] for new appt. Per patient she would like refill for Estradiol cream, but also states having issues she will want to discuss with Provider. Appt  rescheduled for 8/24/23.

## 2023-08-24 ENCOUNTER — TELEMEDICINE (OUTPATIENT)
Age: 68
End: 2023-08-24
Payer: MEDICARE

## 2023-08-24 DIAGNOSIS — Z17.0 MALIGNANT NEOPLASM OF OVERLAPPING SITES OF RIGHT BREAST IN FEMALE, ESTROGEN RECEPTOR POSITIVE (HCC): Primary | ICD-10-CM

## 2023-08-24 DIAGNOSIS — C50.811 MALIGNANT NEOPLASM OF OVERLAPPING SITES OF RIGHT BREAST IN FEMALE, ESTROGEN RECEPTOR POSITIVE (HCC): Primary | ICD-10-CM

## 2023-08-24 DIAGNOSIS — Z85.038 HISTORY OF COLON CANCER: ICD-10-CM

## 2023-08-24 DIAGNOSIS — Z85.41 HISTORY OF CERVICAL CANCER: ICD-10-CM

## 2023-08-24 DIAGNOSIS — Z79.811 LONG TERM (CURRENT) USE OF AROMATASE INHIBITORS: ICD-10-CM

## 2023-08-24 DIAGNOSIS — R92.2 DENSE BREAST TISSUE ON MAMMOGRAM: ICD-10-CM

## 2023-08-24 DIAGNOSIS — M25.549 ARTHRALGIA OF HAND, UNSPECIFIED LATERALITY: ICD-10-CM

## 2023-08-24 PROBLEM — R92.30 DENSE BREAST TISSUE ON MAMMOGRAM: Status: ACTIVE | Noted: 2023-08-24

## 2023-08-24 PROCEDURE — 1123F ACP DISCUSS/DSCN MKR DOCD: CPT | Performed by: INTERNAL MEDICINE

## 2023-08-24 PROCEDURE — G8420 CALC BMI NORM PARAMETERS: HCPCS | Performed by: INTERNAL MEDICINE

## 2023-08-24 PROCEDURE — 1036F TOBACCO NON-USER: CPT | Performed by: INTERNAL MEDICINE

## 2023-08-24 PROCEDURE — 99213 OFFICE O/P EST LOW 20 MIN: CPT | Performed by: INTERNAL MEDICINE

## 2023-08-24 PROCEDURE — 1090F PRES/ABSN URINE INCON ASSESS: CPT | Performed by: INTERNAL MEDICINE

## 2023-08-24 PROCEDURE — G8427 DOCREV CUR MEDS BY ELIG CLIN: HCPCS | Performed by: INTERNAL MEDICINE

## 2023-08-24 PROCEDURE — G8400 PT W/DXA NO RESULTS DOC: HCPCS | Performed by: INTERNAL MEDICINE

## 2023-08-24 PROCEDURE — 3017F COLORECTAL CA SCREEN DOC REV: CPT | Performed by: INTERNAL MEDICINE

## 2023-08-24 RX ORDER — EXEMESTANE 25 MG/1
25 TABLET ORAL DAILY
Qty: 30 TABLET | Refills: 3 | Status: SHIPPED | OUTPATIENT
Start: 2023-08-24

## 2023-08-24 NOTE — PROGRESS NOTES
Monica Oro is a 69 y.o. female    Chief Complaint   Patient presents with    Cancer    Follow-up    Medication Refill     1. Have you been to the ER, urgent care clinic since your last visit? Hospitalized since your last visit? No    2. Have you seen or consulted any other health care providers outside of the [address] [place] [address] since your last visit? Include any pap smears or colon screening.  No
appreciate the opportunity to participate in MsJohana Riddhi Culp's care.     Signed By: Lauren Bradford DO

## 2023-08-25 ENCOUNTER — OFFICE VISIT (OUTPATIENT)
Age: 68
End: 2023-08-25
Payer: MEDICARE

## 2023-08-25 ENCOUNTER — ANCILLARY PROCEDURE (OUTPATIENT)
Age: 68
End: 2023-08-25
Payer: MEDICARE

## 2023-08-25 VITALS
DIASTOLIC BLOOD PRESSURE: 88 MMHG | HEIGHT: 65 IN | BODY MASS INDEX: 24.66 KG/M2 | WEIGHT: 148 LBS | SYSTOLIC BLOOD PRESSURE: 138 MMHG | HEART RATE: 72 BPM | OXYGEN SATURATION: 96 %

## 2023-08-25 VITALS
DIASTOLIC BLOOD PRESSURE: 70 MMHG | BODY MASS INDEX: 24.66 KG/M2 | SYSTOLIC BLOOD PRESSURE: 118 MMHG | WEIGHT: 148 LBS | HEIGHT: 65 IN

## 2023-08-25 DIAGNOSIS — T88.7XXA UNSPECIFIED ADVERSE EFFECT OF DRUG OR MEDICAMENT, INITIAL ENCOUNTER (CODE): ICD-10-CM

## 2023-08-25 DIAGNOSIS — R06.09 DOE (DYSPNEA ON EXERTION): ICD-10-CM

## 2023-08-25 DIAGNOSIS — Z79.899 ENCOUNTER FOR MONITORING CARDIOTOXIC DRUG THERAPY: ICD-10-CM

## 2023-08-25 DIAGNOSIS — Z79.899 ENCOUNTER FOR MONITORING CARDIOTOXIC DRUG THERAPY: Primary | ICD-10-CM

## 2023-08-25 DIAGNOSIS — Z17.0 MALIGNANT NEOPLASM OF OVERLAPPING SITES OF RIGHT BREAST IN FEMALE, ESTROGEN RECEPTOR POSITIVE (HCC): ICD-10-CM

## 2023-08-25 DIAGNOSIS — Z51.81 ENCOUNTER FOR MONITORING CARDIOTOXIC DRUG THERAPY: Primary | ICD-10-CM

## 2023-08-25 DIAGNOSIS — C50.811 MALIGNANT NEOPLASM OF OVERLAPPING SITES OF RIGHT BREAST IN FEMALE, ESTROGEN RECEPTOR POSITIVE (HCC): ICD-10-CM

## 2023-08-25 DIAGNOSIS — Z51.81 ENCOUNTER FOR MONITORING CARDIOTOXIC DRUG THERAPY: ICD-10-CM

## 2023-08-25 LAB
ECHO AO ASC DIAM: 3.5 CM
ECHO AO ASCENDING AORTA INDEX: 2.01 CM/M2
ECHO AO ROOT DIAM: 3.3 CM
ECHO AO ROOT INDEX: 1.9 CM/M2
ECHO BSA: 1.75 M2
ECHO LA DIAMETER INDEX: 1.95 CM/M2
ECHO LA DIAMETER: 3.4 CM
ECHO LA TO AORTIC ROOT RATIO: 1.03
ECHO LA VOL 2C: 42 ML (ref 22–52)
ECHO LA VOL 4C: 33 ML (ref 22–52)
ECHO LA VOL BP: 40 ML (ref 22–52)
ECHO LA VOL/BSA BIPLANE: 23 ML/M2 (ref 16–34)
ECHO LA VOLUME AREA LENGTH: 42 ML
ECHO LA VOLUME INDEX A2C: 24 ML/M2 (ref 16–34)
ECHO LA VOLUME INDEX A4C: 19 ML/M2 (ref 16–34)
ECHO LA VOLUME INDEX AREA LENGTH: 24 ML/M2 (ref 16–34)
ECHO LV EJECTION FRACTION A2C: 57 %
ECHO LV EJECTION FRACTION A4C: 54 %
ECHO LV EJECTION FRACTION BIPLANE: 56 % (ref 55–100)
ECHO LV FRACTIONAL SHORTENING: 41 % (ref 28–44)
ECHO LV GLOBAL LONGITUDINAL STRAIN (GLS): -17.5 %
ECHO LV INTERNAL DIMENSION DIASTOLE INDEX: 2.53 CM/M2
ECHO LV INTERNAL DIMENSION DIASTOLIC: 4.4 CM (ref 3.9–5.3)
ECHO LV INTERNAL DIMENSION SYSTOLIC INDEX: 1.49 CM/M2
ECHO LV INTERNAL DIMENSION SYSTOLIC: 2.6 CM
ECHO LV IVSD: 1 CM (ref 0.6–0.9)
ECHO LV MASS 2D: 147.8 G (ref 67–162)
ECHO LV MASS INDEX 2D: 85 G/M2 (ref 43–95)
ECHO LV POSTERIOR WALL DIASTOLIC: 1 CM (ref 0.6–0.9)
ECHO LV RELATIVE WALL THICKNESS RATIO: 0.45

## 2023-08-25 PROCEDURE — 93308 TTE F-UP OR LMTD: CPT

## 2023-08-25 PROCEDURE — 99214 OFFICE O/P EST MOD 30 MIN: CPT | Performed by: SPECIALIST

## 2023-08-25 ASSESSMENT — PATIENT HEALTH QUESTIONNAIRE - PHQ9
1. LITTLE INTEREST OR PLEASURE IN DOING THINGS: 0
SUM OF ALL RESPONSES TO PHQ QUESTIONS 1-9: 0
SUM OF ALL RESPONSES TO PHQ9 QUESTIONS 1 & 2: 0
2. FEELING DOWN, DEPRESSED OR HOPELESS: 0
SUM OF ALL RESPONSES TO PHQ QUESTIONS 1-9: 0

## 2023-08-25 NOTE — PROGRESS NOTES
Outpatient Medications:     exemestane (AROMASIN) 25 MG tablet, Take 1 tablet by mouth daily, Disp: 30 tablet, Rfl: 3    levothyroxine (SYNTHROID) 50 MCG tablet, TAKE 1 TABLET BY MOUTH DAILY (BEFORE BREAKFAST), Disp: 90 tablet, Rfl: 1    anastrozole (ARIMIDEX) 1 MG tablet, Take 1 tablet by mouth daily, Disp: 90 tablet, Rfl: 2    atorvastatin (LIPITOR) 10 MG tablet, Take 1 tablet by mouth daily, Disp: 30 tablet, Rfl: 1    vitamin D (CHOLECALCIFEROL) 50710 UNIT CAPS, Take 1 capsule by mouth every 7 days, Disp: , Rfl:     estradiol (ESTRACE) 0.1 MG/GM vaginal cream, INSERT 1 GRAM VAGINALLY ONCE A WEEK, Disp: , Rfl:     mometasone (ELOCON) 0.1 % cream, ceived the following from [facility]: Outside name: mometasone (ELOCON) 0.1 % topical cream, Disp: , Rfl:     sertraline (ZOLOFT) 50 MG tablet, Take 1 tablet by mouth daily, Disp: , Rfl:     sulfamethoxazole-trimethoprim (BACTRIM DS;SEPTRA DS) 800-160 MG per tablet, as needed, Disp: , Rfl:     tobramycin-dexamethasone (TOBRADEX) 0.3-0.1 % ophthalmic ointment, ceived the following from [facility]: Outside name: Tobradex ophthalmic ointment, Disp: , Rfl:     triamcinolone acetonide (KENALOG) 0.1 % paste, as needed, Disp: , Rfl:     metoprolol succinate (TOPROL XL) 25 MG extended release tablet, Take 1 tablet by mouth daily, Disp: , Rfl:     nirmatrelvir/ritonavir 300/100 (PAXLOVID) 20 x 150 MG & 10 x 100MG TBPK, As dir, Disp: , Rfl:    Impression see above.

## 2023-10-06 DIAGNOSIS — Z85.3 PERSONAL HISTORY OF BREAST CANCER: Primary | ICD-10-CM

## 2023-10-06 DIAGNOSIS — Z17.0 MALIGNANT NEOPLASM OF OVERLAPPING SITES OF RIGHT BREAST IN FEMALE, ESTROGEN RECEPTOR POSITIVE (HCC): ICD-10-CM

## 2023-10-06 DIAGNOSIS — C50.811 MALIGNANT NEOPLASM OF OVERLAPPING SITES OF RIGHT BREAST IN FEMALE, ESTROGEN RECEPTOR POSITIVE (HCC): ICD-10-CM

## 2023-10-06 RX ORDER — ANASTROZOLE 1 MG/1
1 TABLET ORAL DAILY
Qty: 90 TABLET | Refills: 2 | Status: SHIPPED | OUTPATIENT
Start: 2023-10-06

## 2023-11-09 ENCOUNTER — HOSPITAL ENCOUNTER (OUTPATIENT)
Age: 68
Discharge: HOME | End: 2023-11-09
Payer: MEDICARE

## 2023-11-09 DIAGNOSIS — C50.811 MALIGNANT NEOPLASM OF OVERLAPPING SITES OF RIGHT BREAST IN FEMALE, ESTROGEN RECEPTOR POSITIVE (HCC): ICD-10-CM

## 2023-11-09 DIAGNOSIS — Z17.0 MALIGNANT NEOPLASM OF OVERLAPPING SITES OF RIGHT BREAST IN FEMALE, ESTROGEN RECEPTOR POSITIVE (HCC): ICD-10-CM

## 2023-11-09 DIAGNOSIS — M25.549 ARTHRALGIA OF HAND, UNSPECIFIED LATERALITY: ICD-10-CM

## 2023-11-09 DIAGNOSIS — Z79.811 LONG TERM (CURRENT) USE OF AROMATASE INHIBITORS: ICD-10-CM

## 2023-11-09 DIAGNOSIS — R92.2 DENSE BREAST TISSUE ON MAMMOGRAM: ICD-10-CM

## 2023-11-09 DIAGNOSIS — Z85.41 HISTORY OF CERVICAL CANCER: ICD-10-CM

## 2023-11-09 DIAGNOSIS — Z85.038 HISTORY OF COLON CANCER: ICD-10-CM

## 2023-11-09 PROCEDURE — 6360000004 HC RX CONTRAST MEDICATION: Performed by: RADIOLOGY

## 2023-11-09 PROCEDURE — C8908 MRI W/O FOL W/CONT, BREAST,: HCPCS

## 2023-11-09 PROCEDURE — A9585 GADOBUTROL INJECTION: HCPCS | Performed by: RADIOLOGY

## 2023-11-09 RX ORDER — GADOBUTROL 604.72 MG/ML
6.5 INJECTION INTRAVENOUS
Status: COMPLETED | OUTPATIENT
Start: 2023-11-09 | End: 2023-11-09

## 2023-11-09 RX ADMIN — GADOBUTROL 6.5 ML: 604.72 INJECTION INTRAVENOUS at 10:32

## 2024-02-06 ENCOUNTER — HOSPITAL ENCOUNTER (OUTPATIENT)
Facility: HOSPITAL | Age: 69
Discharge: HOME | End: 2024-02-09

## 2024-02-06 VITALS
SYSTOLIC BLOOD PRESSURE: 133 MMHG | BODY MASS INDEX: 25.29 KG/M2 | HEIGHT: 65 IN | DIASTOLIC BLOOD PRESSURE: 72 MMHG | WEIGHT: 151.8 LBS | HEART RATE: 79 BPM

## 2024-02-06 ASSESSMENT — PAIN SCALES - GENERAL: PAINLEVEL_OUTOF10: 0

## 2024-02-06 NOTE — PROGRESS NOTES
Medication Instructions    anastrozole (ARIMIDEX) 1 mg, Oral, DAILY    atorvastatin (LIPITOR) 10 mg, Oral, DAILY    estradiol (ESTRACE) 0.1 MG/GM vaginal cream INSERT 1 GRAM VAGINALLY ONCE A WEEK    levothyroxine (SYNTHROID) 50 mcg, Oral, DAILY BEFORE BREAKFAST    mometasone (ELOCON) 0.1 % cream ceived the following from [facility]: Outside name: mometasone (ELOCON) 0.1 % topical cream    sertraline (ZOLOFT) 50 MG tablet 1 tablet, Oral, DAILY    sulfamethoxazole-trimethoprim (BACTRIM DS;SEPTRA DS) 800-160 MG per tablet PRN    tobramycin-dexamethasone (TOBRADEX) 0.3-0.1 % ophthalmic ointment ceived the following from [facility]: Outside name: Tobradex ophthalmic ointment    triamcinolone acetonide (KENALOG) 0.1 % paste PRN    vitamin D (CHOLECALCIFEROL) 50,000 Units, Oral, EVERY 7 DAYS      PQRS Medication Reconciliation: Medications documented and reviewed    Physical Exam:   Vitals: Blood pressure 133/72, pulse 79, height 1.651 m (5' 5\"), weight 68.9 kg (151 lb 12.8 oz).   ECOG 0, fully active, able to carry on all predisease activities without restrictions  Constitutional: Pleasant, sitting comfortably in no acute distress.   HEENT: Moist mucous membranes.  Cardiac: Good peripheral perfusion, no upper or lower extremity edema bilaterally.  Pulmonary: No increased work of breathing. Symmetric chest rise  Skin: Warm, dry, no rashes noted.  Neurologic: Alert and oriented.  Psychiatric: Cooperative to commands and responds to questions appropriately.  Breast: Left breast and nodes WNL. Right breast lower outer scarring and volume loss, no masses or concerning findings. No lymphedema or lymphadenopathy. Good shoulder ROM.    Assessment & Plan   Ms. Culp is a 68 y.o. female with h/o Right breast T2N0 IDC.    Right lumpectomy with Dr. Rodriguez 4/20/2021- 2.8cm grade 2 IDC with DCIS no EIC. Negative margins. 0/1 LN. pT2N0    Adjuvant weekly Taxol/Herceptin x 12 weeks

## 2024-04-02 DIAGNOSIS — Z17.0 MALIGNANT NEOPLASM OF OVERLAPPING SITES OF RIGHT BREAST IN FEMALE, ESTROGEN RECEPTOR POSITIVE (HCC): ICD-10-CM

## 2024-04-02 DIAGNOSIS — C50.811 MALIGNANT NEOPLASM OF OVERLAPPING SITES OF RIGHT BREAST IN FEMALE, ESTROGEN RECEPTOR POSITIVE (HCC): ICD-10-CM

## 2024-04-02 RX ORDER — ANASTROZOLE 1 MG/1
1 TABLET ORAL DAILY
Qty: 90 TABLET | Refills: 2 | Status: SHIPPED | OUTPATIENT
Start: 2024-04-02

## 2024-05-24 ENCOUNTER — HOSPITAL ENCOUNTER (OUTPATIENT)
Facility: HOSPITAL | Age: 69
End: 2024-05-24
Payer: MEDICARE

## 2024-05-24 VITALS — WEIGHT: 151.9 LBS | HEIGHT: 65 IN | BODY MASS INDEX: 25.31 KG/M2

## 2024-05-24 DIAGNOSIS — Z85.3 PERSONAL HISTORY OF BREAST CANCER: ICD-10-CM

## 2024-05-24 PROCEDURE — G0279 TOMOSYNTHESIS, MAMMO: HCPCS

## 2024-05-27 PROBLEM — Z95.828 PORT-A-CATH IN PLACE: Status: RESOLVED | Noted: 2021-06-16 | Resolved: 2024-05-27

## 2024-05-27 PROBLEM — T45.1X5A CHEMOTHERAPY-INDUCED FATIGUE: Status: RESOLVED | Noted: 2021-09-08 | Resolved: 2024-05-27

## 2024-05-27 PROBLEM — L43.9 LICHEN PLANUS: Status: ACTIVE | Noted: 2024-05-27

## 2024-05-27 PROBLEM — Z98.890 HISTORY OF LUMPECTOMY OF RIGHT BREAST: Status: ACTIVE | Noted: 2024-05-27

## 2024-05-27 PROBLEM — K12.31 MUCOSITIS DUE TO CHEMOTHERAPY: Status: RESOLVED | Noted: 2021-06-23 | Resolved: 2024-05-27

## 2024-05-27 PROBLEM — N89.8 VAGINAL DRYNESS: Status: ACTIVE | Noted: 2024-05-27

## 2024-05-27 PROBLEM — E78.00 HIGH CHOLESTEROL: Status: ACTIVE | Noted: 2024-05-27

## 2024-05-27 PROBLEM — R11.0 CHEMOTHERAPY-INDUCED NAUSEA: Status: RESOLVED | Noted: 2021-07-21 | Resolved: 2024-05-27

## 2024-05-27 PROBLEM — Z79.899 ENCOUNTER FOR MONITORING CARDIOTOXIC DRUG THERAPY: Status: RESOLVED | Noted: 2021-09-08 | Resolved: 2024-05-27

## 2024-05-27 PROBLEM — Z51.11 ENCOUNTER FOR ANTINEOPLASTIC CHEMOTHERAPY: Status: RESOLVED | Noted: 2021-06-16 | Resolved: 2024-05-27

## 2024-05-27 PROBLEM — R53.83 CHEMOTHERAPY-INDUCED FATIGUE: Status: RESOLVED | Noted: 2021-09-08 | Resolved: 2024-05-27

## 2024-05-27 PROBLEM — Z51.81 ENCOUNTER FOR MONITORING CARDIOTOXIC DRUG THERAPY: Status: RESOLVED | Noted: 2021-09-08 | Resolved: 2024-05-27

## 2024-05-27 PROBLEM — T45.1X5A CHEMOTHERAPY-INDUCED NAUSEA: Status: RESOLVED | Noted: 2021-07-21 | Resolved: 2024-05-27

## 2024-05-27 PROBLEM — E03.9 HYPOTHYROIDISM: Status: ACTIVE | Noted: 2024-05-27

## 2024-05-28 ENCOUNTER — OFFICE VISIT (OUTPATIENT)
Age: 69
End: 2024-05-28
Payer: MEDICARE

## 2024-05-28 VITALS
OXYGEN SATURATION: 96 % | DIASTOLIC BLOOD PRESSURE: 80 MMHG | HEART RATE: 79 BPM | WEIGHT: 144 LBS | BODY MASS INDEX: 23.96 KG/M2 | TEMPERATURE: 98.2 F | SYSTOLIC BLOOD PRESSURE: 127 MMHG | RESPIRATION RATE: 18 BRPM

## 2024-05-28 DIAGNOSIS — L43.9 LICHEN PLANUS: ICD-10-CM

## 2024-05-28 DIAGNOSIS — E78.00 HIGH CHOLESTEROL: ICD-10-CM

## 2024-05-28 DIAGNOSIS — Z17.0 MALIGNANT NEOPLASM OF OVERLAPPING SITES OF RIGHT BREAST IN FEMALE, ESTROGEN RECEPTOR POSITIVE (HCC): Primary | ICD-10-CM

## 2024-05-28 DIAGNOSIS — Z85.3 PERSONAL HISTORY OF BREAST CANCER: ICD-10-CM

## 2024-05-28 DIAGNOSIS — M25.549 ARTHRALGIA OF HAND, UNSPECIFIED LATERALITY: ICD-10-CM

## 2024-05-28 DIAGNOSIS — Z85.038 HISTORY OF COLON CANCER: ICD-10-CM

## 2024-05-28 DIAGNOSIS — C50.811 MALIGNANT NEOPLASM OF OVERLAPPING SITES OF RIGHT BREAST IN FEMALE, ESTROGEN RECEPTOR POSITIVE (HCC): Primary | ICD-10-CM

## 2024-05-28 DIAGNOSIS — Z98.890 HISTORY OF LUMPECTOMY OF RIGHT BREAST: ICD-10-CM

## 2024-05-28 DIAGNOSIS — E03.9 HYPOTHYROIDISM, UNSPECIFIED TYPE: ICD-10-CM

## 2024-05-28 DIAGNOSIS — N89.8 VAGINAL DRYNESS: ICD-10-CM

## 2024-05-28 DIAGNOSIS — Z85.41 HISTORY OF CERVICAL CANCER: ICD-10-CM

## 2024-05-28 DIAGNOSIS — Z79.811 USE OF ANASTROZOLE (ARIMIDEX): ICD-10-CM

## 2024-05-28 PROCEDURE — 1036F TOBACCO NON-USER: CPT | Performed by: NURSE PRACTITIONER

## 2024-05-28 PROCEDURE — G8420 CALC BMI NORM PARAMETERS: HCPCS | Performed by: NURSE PRACTITIONER

## 2024-05-28 PROCEDURE — 1123F ACP DISCUSS/DSCN MKR DOCD: CPT | Performed by: NURSE PRACTITIONER

## 2024-05-28 PROCEDURE — 99213 OFFICE O/P EST LOW 20 MIN: CPT | Performed by: NURSE PRACTITIONER

## 2024-05-28 PROCEDURE — 1090F PRES/ABSN URINE INCON ASSESS: CPT | Performed by: NURSE PRACTITIONER

## 2024-05-28 PROCEDURE — G8427 DOCREV CUR MEDS BY ELIG CLIN: HCPCS | Performed by: NURSE PRACTITIONER

## 2024-05-28 PROCEDURE — G8400 PT W/DXA NO RESULTS DOC: HCPCS | Performed by: NURSE PRACTITIONER

## 2024-05-28 PROCEDURE — 3017F COLORECTAL CA SCREEN DOC REV: CPT | Performed by: NURSE PRACTITIONER

## 2024-05-28 RX ORDER — TAMOXIFEN CITRATE 20 MG/1
20 TABLET ORAL DAILY
Qty: 90 TABLET | Refills: 3 | Status: SHIPPED | OUTPATIENT
Start: 2024-05-28 | End: 2024-05-28

## 2024-05-28 RX ORDER — TAMOXIFEN CITRATE 20 MG/1
20 TABLET ORAL DAILY
Qty: 90 TABLET | Refills: 3 | Status: SHIPPED | OUTPATIENT
Start: 2024-05-28

## 2024-05-28 NOTE — PROGRESS NOTES
Monica Culp is a 68 y.o. female    Chief Complaint   Patient presents with    Follow-up     Right Breast Cancer     1. Have you been to the ER, urgent care clinic since your last visit?  Hospitalized since your last visit?No    2. Have you seen or consulted any other health care providers outside of the [facility] since your last visit?  Include any pap smears or colon screening. [facility]      
and  2/26/2021.    EXAM: MRI of right and left breast without and with IV contrast Gadolinium .    TECHNIQUE: MRI breast without and with IV contrast. Bilateral breast MRI was  performed using a dedicated breast coil without compression with the patient in  the prone position. Precontrast T1-weighted images with fat suppression were  obtained followed by bolus injection of 6.5 mL of Gadavist . Postcontrast  dynamic and high-resolution images were acquired. Axial inversion recovery  imaging was also performed. The images were analyzed using CAD analysis,  enhancement curves, digital subtraction, and 2 and 3 dimensional  reconstructions.    FINDINGS:    Background parenchymal enhancement: Mild.    Lymph nodes: No lymphadenopathy.    Right breast: Postsurgical scarring, fat necrosis, and susceptibility artifact  lateral. There is no suspicious focus of morphology or temporal enhancement.  Normal nipple..    Left breast: There is no suspicious focus of morphology or temporal enhancement.  Normal skin and nipple.    Miscellaneous: None.    No significant change since MRI one year ago.    Impression  No MRI evidence of malignancy. Posttreatment right breast.    Assessment: ACR BI-RADS category  Right breast: BI-RADS Assessment Category 2: Benign finding.  Left breast: BI-RADS Assessment Category 1: Negative.    Recommendation: Three-dimensional screening mammography in late May, 2024.  Screening MRI breast in one year if clinically indicated.    A negative breast MRI examination speaks strongly against invasive cancer down  to a detection threshold of 3 to 5 mm but may not detect some lower grade or in  situ carcinomas. Therefore, routine clinical and mammographic followup are  recommended.  A summary portfolio has been created in PACS.    Mammogram Result (most recent):  [facility] DIGITAL DIAGNOSTIC BILATERAL 05/24/2024    Narrative  STUDY: Bilateral diagnostic Digital Mammography including 3-D

## 2024-06-05 ENCOUNTER — PATIENT MESSAGE (OUTPATIENT)
Age: 69
End: 2024-06-05

## 2024-06-06 NOTE — TELEPHONE ENCOUNTER
From: Monica Culp  To: Dr. Shikha Junior  Sent: 6/5/2024 5:53 PM EDT  Subject: Anastrozole/Tamoxifen     The pharmacy recenly filled a 3?month prescription for Anastrozole for me and will not fill the new one for Tamoxifen.  Had surgery Thursday. Doing well. Currently not taking Anastrozole.  Not sure what to do. Pharmacy said insurance rejected the script or something.      Thank you  Grace

## 2024-06-06 NOTE — TELEPHONE ENCOUNTER
Oral Hormone therapy     Monica Culp is a  68 y.o.female  diagnosed with breast cancer . Ms. Culp is being treated with Tamoxifen.     Medication name: Tamoxifen    Dose:  20 mg   Frequency: daily    Ordering provider: Shikha López DO  Start date: pending        Last OV 5/28/24      Refill for Tamoxifen sent to pharmacy on file    Contacted pharmacy [place] and prescription is ready to be picked up.        Hafsa Mendosa, BEVERLYD, BCOP, BCPS    For Pharmacy Admin Tracking Only    Program: Medical Group  CPA in place:  Yes  Recommendation Provided To: Patient/Caregiver: 1 via Engagio Message and Pharmacy: 1    Intervention Accepted By: Patient/Caregiver: 1 and Pharmacy: 1    Time Spent (min): 15

## 2024-07-06 PROBLEM — F32.5 MAJOR DEPRESSIVE DISORDER WITH SINGLE EPISODE, IN FULL REMISSION (HCC): Status: ACTIVE | Noted: 2024-07-06

## 2024-08-22 ENCOUNTER — HOSPITAL ENCOUNTER (OUTPATIENT)
Facility: HOSPITAL | Age: 69
Discharge: HOME | End: 2024-08-22
Attending: FAMILY MEDICINE
Payer: MEDICARE

## 2024-08-22 DIAGNOSIS — M81.0 AGE-RELATED OSTEOPOROSIS WITHOUT CURRENT PATHOLOGICAL FRACTURE: ICD-10-CM

## 2024-08-22 PROCEDURE — 77080 DXA BONE DENSITY AXIAL: CPT

## 2024-08-26 ENCOUNTER — ANCILLARY PROCEDURE (OUTPATIENT)
Age: 69
End: 2024-08-26
Payer: MEDICARE

## 2024-08-26 ENCOUNTER — OFFICE VISIT (OUTPATIENT)
Age: 69
End: 2024-08-26
Payer: MEDICARE

## 2024-08-26 VITALS
SYSTOLIC BLOOD PRESSURE: 138 MMHG | WEIGHT: 145 LBS | RESPIRATION RATE: 18 BRPM | BODY MASS INDEX: 24.16 KG/M2 | OXYGEN SATURATION: 95 % | HEART RATE: 66 BPM | DIASTOLIC BLOOD PRESSURE: 88 MMHG | HEIGHT: 65 IN

## 2024-08-26 VITALS
WEIGHT: 144 LBS | HEIGHT: 65 IN | BODY MASS INDEX: 23.99 KG/M2 | DIASTOLIC BLOOD PRESSURE: 90 MMHG | SYSTOLIC BLOOD PRESSURE: 138 MMHG

## 2024-08-26 DIAGNOSIS — Z51.81 ENCOUNTER FOR MONITORING CARDIOTOXIC DRUG THERAPY: ICD-10-CM

## 2024-08-26 DIAGNOSIS — Z17.0 MALIGNANT NEOPLASM OF OVERLAPPING SITES OF RIGHT BREAST IN FEMALE, ESTROGEN RECEPTOR POSITIVE (HCC): Primary | ICD-10-CM

## 2024-08-26 DIAGNOSIS — Z79.899 ENCOUNTER FOR MONITORING CARDIOTOXIC DRUG THERAPY: Primary | ICD-10-CM

## 2024-08-26 DIAGNOSIS — Z79.899 ENCOUNTER FOR MONITORING CARDIOTOXIC DRUG THERAPY: ICD-10-CM

## 2024-08-26 DIAGNOSIS — Z51.81 ENCOUNTER FOR MONITORING CARDIOTOXIC DRUG THERAPY: Primary | ICD-10-CM

## 2024-08-26 DIAGNOSIS — T88.7XXA UNSPECIFIED ADVERSE EFFECT OF DRUG OR MEDICAMENT, INITIAL ENCOUNTER (CODE): ICD-10-CM

## 2024-08-26 DIAGNOSIS — C50.811 MALIGNANT NEOPLASM OF OVERLAPPING SITES OF RIGHT BREAST IN FEMALE, ESTROGEN RECEPTOR POSITIVE (HCC): Primary | ICD-10-CM

## 2024-08-26 DIAGNOSIS — R06.09 DOE (DYSPNEA ON EXERTION): ICD-10-CM

## 2024-08-26 DIAGNOSIS — Z17.0 MALIGNANT NEOPLASM OF OVERLAPPING SITES OF RIGHT BREAST IN FEMALE, ESTROGEN RECEPTOR POSITIVE (HCC): ICD-10-CM

## 2024-08-26 DIAGNOSIS — C50.811 MALIGNANT NEOPLASM OF OVERLAPPING SITES OF RIGHT BREAST IN FEMALE, ESTROGEN RECEPTOR POSITIVE (HCC): ICD-10-CM

## 2024-08-26 LAB
ECHO AO ASC DIAM: 3.3 CM
ECHO AO ASCENDING AORTA INDEX: 1.91 CM/M2
ECHO AO ROOT DIAM: 3.2 CM
ECHO AO ROOT INDEX: 1.85 CM/M2
ECHO AR MAX VEL PISA: 2.9 M/S
ECHO AV MEAN GRADIENT: 2 MMHG
ECHO AV MEAN VELOCITY: 0.7 M/S
ECHO AV PEAK GRADIENT: 5 MMHG
ECHO AV PEAK VELOCITY: 1.1 M/S
ECHO AV REGURGITANT PHT: 911.4 MILLISECOND
ECHO AV VELOCITY RATIO: 1
ECHO AV VTI: 18.8 CM
ECHO BSA: 1.73 M2
ECHO EST RA PRESSURE: 3 MMHG
ECHO LA DIAMETER INDEX: 1.45 CM/M2
ECHO LA DIAMETER: 2.5 CM
ECHO LA TO AORTIC ROOT RATIO: 0.78
ECHO LA VOL A-L A2C: 47 ML (ref 22–52)
ECHO LA VOL A-L A4C: 29 ML (ref 22–52)
ECHO LA VOL BP: 36 ML (ref 22–52)
ECHO LA VOL MOD A2C: 45 ML (ref 22–52)
ECHO LA VOL MOD A4C: 28 ML (ref 22–52)
ECHO LA VOL/BSA BIPLANE: 21 ML/M2 (ref 16–34)
ECHO LA VOLUME AREA LENGTH: 38 ML
ECHO LA VOLUME INDEX A-L A2C: 27 ML/M2 (ref 16–34)
ECHO LA VOLUME INDEX A-L A4C: 17 ML/M2 (ref 16–34)
ECHO LA VOLUME INDEX AREA LENGTH: 22 ML/M2 (ref 16–34)
ECHO LA VOLUME INDEX MOD A2C: 26 ML/M2 (ref 16–34)
ECHO LA VOLUME INDEX MOD A4C: 16 ML/M2 (ref 16–34)
ECHO LV E' LATERAL VELOCITY: 7 CM/S
ECHO LV E' SEPTAL VELOCITY: 5 CM/S
ECHO LV EDV A2C: 80 ML
ECHO LV EDV A4C: 92 ML
ECHO LV EDV BP: 87 ML (ref 56–104)
ECHO LV EDV INDEX A4C: 53 ML/M2
ECHO LV EDV INDEX BP: 50 ML/M2
ECHO LV EDV NDEX A2C: 46 ML/M2
ECHO LV EJECTION FRACTION A2C: 56 %
ECHO LV EJECTION FRACTION A4C: 56 %
ECHO LV EJECTION FRACTION BIPLANE: 56 % (ref 55–100)
ECHO LV ESV A2C: 35 ML
ECHO LV ESV A4C: 41 ML
ECHO LV ESV BP: 38 ML (ref 19–49)
ECHO LV ESV INDEX A2C: 20 ML/M2
ECHO LV ESV INDEX A4C: 24 ML/M2
ECHO LV ESV INDEX BP: 22 ML/M2
ECHO LV FRACTIONAL SHORTENING: 26 % (ref 28–44)
ECHO LV GLOBAL LONGITUDINAL STRAIN (GLS): -17.6 %
ECHO LV INTERNAL DIMENSION DIASTOLE INDEX: 2.2 CM/M2
ECHO LV INTERNAL DIMENSION DIASTOLIC: 3.8 CM (ref 3.9–5.3)
ECHO LV INTERNAL DIMENSION SYSTOLIC INDEX: 1.62 CM/M2
ECHO LV INTERNAL DIMENSION SYSTOLIC: 2.8 CM
ECHO LV IVSD: 1.3 CM (ref 0.6–0.9)
ECHO LV MASS 2D: 143.8 G (ref 67–162)
ECHO LV MASS INDEX 2D: 83.1 G/M2 (ref 43–95)
ECHO LV POSTERIOR WALL DIASTOLIC: 1 CM (ref 0.6–0.9)
ECHO LV RELATIVE WALL THICKNESS RATIO: 0.53
ECHO LVOT AV VTI INDEX: 1.05
ECHO LVOT MEAN GRADIENT: 2 MMHG
ECHO LVOT PEAK GRADIENT: 4 MMHG
ECHO LVOT PEAK VELOCITY: 1.1 M/S
ECHO LVOT VTI: 19.7 CM
ECHO MV A VELOCITY: 0.81 M/S
ECHO MV AREA PHT: 2.6 CM2
ECHO MV E DECELERATION TIME (DT): 295 MS
ECHO MV E VELOCITY: 0.55 M/S
ECHO MV E/A RATIO: 0.68
ECHO MV E/E' LATERAL: 7.86
ECHO MV E/E' RATIO (AVERAGED): 9.43
ECHO MV E/E' SEPTAL: 11
ECHO MV PRESSURE HALF TIME (PHT): 85.6 MS
ECHO RA AREA 4C: 15.9 CM2
ECHO RA END SYSTOLIC VOLUME APICAL 4 CHAMBER INDEX BSA: 23 ML/M2
ECHO RA VOLUME AREA LENGTH APICAL 4 CHAMBER: 41 ML
ECHO RA VOLUME: 40 ML
ECHO RIGHT VENTRICULAR SYSTOLIC PRESSURE (RVSP): 21 MMHG
ECHO RV FREE WALL PEAK S': 8 CM/S
ECHO RV INTERNAL DIMENSION: 2.9 CM
ECHO RV TAPSE: 1.9 CM (ref 1.7–?)
ECHO TV REGURGITANT MAX VELOCITY: 2.1 M/S
ECHO TV REGURGITANT PEAK GRADIENT: 18 MMHG

## 2024-08-26 PROCEDURE — G8427 DOCREV CUR MEDS BY ELIG CLIN: HCPCS | Performed by: SPECIALIST

## 2024-08-26 PROCEDURE — 93010 ELECTROCARDIOGRAM REPORT: CPT | Performed by: SPECIALIST

## 2024-08-26 PROCEDURE — 99214 OFFICE O/P EST MOD 30 MIN: CPT | Performed by: SPECIALIST

## 2024-08-26 PROCEDURE — 1090F PRES/ABSN URINE INCON ASSESS: CPT | Performed by: SPECIALIST

## 2024-08-26 PROCEDURE — 1123F ACP DISCUSS/DSCN MKR DOCD: CPT | Performed by: SPECIALIST

## 2024-08-26 PROCEDURE — G8420 CALC BMI NORM PARAMETERS: HCPCS | Performed by: SPECIALIST

## 2024-08-26 PROCEDURE — 93356 MYOCRD STRAIN IMG SPCKL TRCK: CPT | Performed by: SPECIALIST

## 2024-08-26 PROCEDURE — 93005 ELECTROCARDIOGRAM TRACING: CPT | Performed by: SPECIALIST

## 2024-08-26 PROCEDURE — 1036F TOBACCO NON-USER: CPT | Performed by: SPECIALIST

## 2024-08-26 PROCEDURE — 3017F COLORECTAL CA SCREEN DOC REV: CPT | Performed by: SPECIALIST

## 2024-08-26 PROCEDURE — 93306 TTE W/DOPPLER COMPLETE: CPT | Performed by: SPECIALIST

## 2024-08-26 PROCEDURE — G8400 PT W/DXA NO RESULTS DOC: HCPCS | Performed by: SPECIALIST

## 2024-08-26 RX ORDER — CHOLECALCIFEROL (VITAMIN D3) 100000/G
1000 POWDER (GRAM) MISCELLANEOUS SEE ADMIN INSTRUCTIONS
COMMUNITY

## 2024-08-26 RX ORDER — LETROZOLE 2.5 MG/1
2.5 TABLET, FILM COATED ORAL DAILY
Qty: 90 TABLET | Refills: 3 | Status: SHIPPED | OUTPATIENT
Start: 2024-08-26

## 2024-08-26 ASSESSMENT — PATIENT HEALTH QUESTIONNAIRE - PHQ9
SUM OF ALL RESPONSES TO PHQ QUESTIONS 1-9: 0
SUM OF ALL RESPONSES TO PHQ QUESTIONS 1-9: 0
2. FEELING DOWN, DEPRESSED OR HOPELESS: NOT AT ALL
SUM OF ALL RESPONSES TO PHQ QUESTIONS 1-9: 0
1. LITTLE INTEREST OR PLEASURE IN DOING THINGS: NOT AT ALL
SUM OF ALL RESPONSES TO PHQ QUESTIONS 1-9: 0
SUM OF ALL RESPONSES TO PHQ9 QUESTIONS 1 & 2: 0

## 2024-08-26 NOTE — PROGRESS NOTES
except as above ; Resp-denies wheezing  or productive cough,. Const- No unusual weight loss or fever; Neuro-no recent seizure or CVA ; GI- No BRBPR, abdom pain, bloating ; - no  hematuria   see supplement sheet, initialed and to be scanned by staff    Past Medical History:   Diagnosis Date    Anxiety 2000    BRCA1 negative     BRCA2 negative     Breast cancer (HCC) 04/21/2021    Right    Cancer (HCC)     cervical and colon    Cervical cancer (HCC) 1983    Colon cancer (HCC) 2008    History of therapeutic radiation     Hx antineoplastic chemo     Radiation therapy complication       Social Hx= reports that she quit smoking about 48 years ago. Her smoking use included cigarettes. She started smoking about 58 years ago. She has a 2.5 pack-year smoking history. She has never been exposed to tobacco smoke. She has never used smokeless tobacco. She reports that she does not currently use alcohol. She reports that she does not use drugs.   Family Hx- family history includes Breast Cancer (age of onset: 50) in her sister; Breast Cancer (age of onset: 75) in her mother; Cancer in her father and mother; Colon Cancer in her brother; Heart Disease in her mother; Hypertension in her sister; Melanoma in her father; Prostate Cancer in her brother and brother.   Allergies   Allergen Reactions    Ciprofloxacin Hcl Nausea And Vomiting    Ciprofloxacin Nausea And Vomiting        Exam and Labs:  /88 (Site: Left Upper Arm, Position: Sitting, Cuff Size: Large Adult)   Pulse 66   Resp 18   Ht 1.651 m (5' 5\")   Wt 65.8 kg (145 lb)   SpO2 95%   BMI 24.13 kg/m²    @Constitutional:  NAD, comfortable  Head: NC,AT. Eyes: No scleral icterus. Neck:  Neck supple. No JVD present.Throat: moist mucous membranes.  Chest: Effort normal & normal respiratory excursion .Neurological: alert, conversant and oriented . Skin: Skin is not cold. No obvious systemic rash noted. Not diaphoretic. No erythema.   Psychiatric:  Grossly normal mood and

## 2024-10-23 DIAGNOSIS — Z17.0 MALIGNANT NEOPLASM OF OVERLAPPING SITES OF RIGHT BREAST IN FEMALE, ESTROGEN RECEPTOR POSITIVE (HCC): Primary | ICD-10-CM

## 2024-10-23 DIAGNOSIS — C50.811 MALIGNANT NEOPLASM OF OVERLAPPING SITES OF RIGHT BREAST IN FEMALE, ESTROGEN RECEPTOR POSITIVE (HCC): Primary | ICD-10-CM

## 2024-11-11 ENCOUNTER — HOSPITAL ENCOUNTER (OUTPATIENT)
Age: 69
Discharge: HOME | End: 2024-11-14
Payer: MEDICARE

## 2024-11-11 DIAGNOSIS — Z17.0 MALIGNANT NEOPLASM OF OVERLAPPING SITES OF RIGHT BREAST IN FEMALE, ESTROGEN RECEPTOR POSITIVE (HCC): ICD-10-CM

## 2024-11-11 DIAGNOSIS — C50.811 MALIGNANT NEOPLASM OF OVERLAPPING SITES OF RIGHT BREAST IN FEMALE, ESTROGEN RECEPTOR POSITIVE (HCC): ICD-10-CM

## 2024-11-11 PROCEDURE — 6360000004 HC RX CONTRAST MEDICATION: Performed by: RADIOLOGY

## 2024-11-11 PROCEDURE — C8908 MRI W/O FOL W/CONT, BREAST,: HCPCS

## 2024-11-11 PROCEDURE — A9585 GADOBUTROL INJECTION: HCPCS | Performed by: RADIOLOGY

## 2024-11-11 RX ORDER — GADOBUTROL 604.72 MG/ML
6.5 INJECTION INTRAVENOUS
Status: COMPLETED | OUTPATIENT
Start: 2024-11-11 | End: 2024-11-11

## 2024-11-11 RX ADMIN — GADOBUTROL 6.5 ML: 604.72 INJECTION INTRAVENOUS at 11:16

## 2024-11-27 ENCOUNTER — TELEPHONE (OUTPATIENT)
Age: 69
End: 2024-11-27

## 2024-12-05 NOTE — PROGRESS NOTES
[facility] [place] [facility]  [address], [address] [place] [address]  W: [phone]  F: [phone]    Reason for Visit:   Monica Culp is a 69 y.o. female seen today in office for follow up of Right Breast Cancer.    Treatment History:   Right Breast Biopsy  Right Lumpectomy 4/21/21 at [facility]  Adjuvant weekly Taxol/Herceptin x 12 weeks from 6/16/21 - 9/8/21    Taxol DR to 60 mg/m2 on Day 8 Cycle 3 due to side effects  Stopped 9/25/21 due to decreased EF  Completed radiation on 11/30/21  Adjuvant hormonal therapy with Anastrozole 4/22 (delayed start per patient preference) - 5/24  Has been off/on Anastrozole over the past 2 years due to side effects  Tried to switch to Aromasin on 8/24/23 but too expensive and stayed on Anastrozole   Stopped anastrozole 4/24.     STAGE:  T2N0 ER+ HER2 +  MYRISK negative    History of Present Illness:   Monica Culp is a 69 y.o. female seen today in office for follow up of ER+ Her2+ Right Breast Cancer post lumpectomy at [facility] on 4/21/21.   Doing great.  Off hormonal therapy. Happy being off.   Went to [facility]. Breast MRI good.   Saw plastics but not doing anything.   No fevers/ chills/ chest pain/ SOB/ nausea/ vomiting/diarrhea/ pain/fatigue    Past Medical History:   Diagnosis Date    Anxiety 2000    BRCA1 negative     BRCA2 negative     Breast cancer (HCC) 04/21/2021    Right    Cancer (HCC)     cervical and colon    Cervical cancer (HCC) 1983    Colon cancer (HCC) 2008    History of therapeutic radiation     Hx antineoplastic chemo     Radiation therapy complication       Past Surgical History:   Procedure Laterality Date    BREAST BIOPSY Right 2021    COLONOSCOPY N/A 4/13/2023    COLONOSCOPY performed by Nupur Lawson MD at [facility] ENDOSCOPY    COLONOSCOPY      UPPER GASTROINTESTINAL ENDOSCOPY  2023      Social History     Tobacco Use    Smoking status: Former     Current packs/day: 0.00     Average packs/day: 0.3 packs/day for 10.0 years (2.5 ttl

## 2024-12-06 ENCOUNTER — OFFICE VISIT (OUTPATIENT)
Age: 69
End: 2024-12-06
Payer: MEDICARE

## 2024-12-06 VITALS
RESPIRATION RATE: 18 BRPM | TEMPERATURE: 98.3 F | SYSTOLIC BLOOD PRESSURE: 121 MMHG | BODY MASS INDEX: 24.3 KG/M2 | HEART RATE: 72 BPM | OXYGEN SATURATION: 98 % | DIASTOLIC BLOOD PRESSURE: 80 MMHG | WEIGHT: 146 LBS

## 2024-12-06 DIAGNOSIS — Z98.890 HISTORY OF LUMPECTOMY OF RIGHT BREAST: ICD-10-CM

## 2024-12-06 DIAGNOSIS — C50.811 MALIGNANT NEOPLASM OF OVERLAPPING SITES OF RIGHT BREAST IN FEMALE, ESTROGEN RECEPTOR POSITIVE (HCC): Primary | ICD-10-CM

## 2024-12-06 DIAGNOSIS — Z17.0 MALIGNANT NEOPLASM OF OVERLAPPING SITES OF RIGHT BREAST IN FEMALE, ESTROGEN RECEPTOR POSITIVE (HCC): Primary | ICD-10-CM

## 2024-12-06 PROCEDURE — 1123F ACP DISCUSS/DSCN MKR DOCD: CPT | Performed by: INTERNAL MEDICINE

## 2024-12-06 PROCEDURE — 1090F PRES/ABSN URINE INCON ASSESS: CPT | Performed by: INTERNAL MEDICINE

## 2024-12-06 PROCEDURE — 3017F COLORECTAL CA SCREEN DOC REV: CPT | Performed by: INTERNAL MEDICINE

## 2024-12-06 PROCEDURE — G8399 PT W/DXA RESULTS DOCUMENT: HCPCS | Performed by: INTERNAL MEDICINE

## 2024-12-06 PROCEDURE — 1036F TOBACCO NON-USER: CPT | Performed by: INTERNAL MEDICINE

## 2024-12-06 PROCEDURE — 1126F AMNT PAIN NOTED NONE PRSNT: CPT | Performed by: INTERNAL MEDICINE

## 2024-12-06 PROCEDURE — G8484 FLU IMMUNIZE NO ADMIN: HCPCS | Performed by: INTERNAL MEDICINE

## 2024-12-06 PROCEDURE — 99213 OFFICE O/P EST LOW 20 MIN: CPT | Performed by: INTERNAL MEDICINE

## 2024-12-06 PROCEDURE — 1160F RVW MEDS BY RX/DR IN RCRD: CPT | Performed by: INTERNAL MEDICINE

## 2024-12-06 PROCEDURE — G8427 DOCREV CUR MEDS BY ELIG CLIN: HCPCS | Performed by: INTERNAL MEDICINE

## 2024-12-06 PROCEDURE — G8420 CALC BMI NORM PARAMETERS: HCPCS | Performed by: INTERNAL MEDICINE

## 2024-12-06 PROCEDURE — 1159F MED LIST DOCD IN RCRD: CPT | Performed by: INTERNAL MEDICINE

## 2024-12-06 NOTE — PROGRESS NOTES
Monica Culp is a 69 y.o. female    Chief Complaint   Patient presents with    Follow-up     Right Breast Cancer         1. Have you been to the ER, urgent care clinic since your last visit?  Hospitalized since your last visit?No    2. Have you seen or consulted any other health care providers outside of the [facility] since your last visit?  Include any pap smears or colon screening. No

## 2025-03-28 ENCOUNTER — TELEPHONE (OUTPATIENT)
Age: 70
End: 2025-03-28

## 2025-03-28 NOTE — TELEPHONE ENCOUNTER
[id number]  Call to #[phone], spoke with pt, ID verified x2.  Pt concerns she has been experiencing \"weird sensations\".   Pt described her Right breast feels like it \"got hit with a baseball bat\". Hurts worse with touch. Started about one month ago. Pt thought maybe she slept on that side wrong since pain comes and goes. Pt mentioned that the left breast, near the nipple, she feels nerve sensations.    Pt denies bruising, any discoloration, or discharge. Sometimes appears red but pt not sure redness due to pressing on the area.     Advised pt she would need to contact breast surgeon to eval the breasts. Pt stated Dr. Jose Riojas is no longer with the practice. Discussed pt can still contact office to inquire if able to be seen with one of the surgeons there. Pt also mention she is a friend of Dr. Jade. Pt can inquire with Dr. Jade as well if she wants. Pt requesting [facility] with contact info of [facility] at [facility]. Will update provider. Understanding verbalized of all, pt appreciative of call.     [facility] sent

## 2025-03-28 NOTE — TELEPHONE ENCOUNTER
Pt called requesting to speak with nurse; stating she's having pains on right side of breast; feels like she's been \"hit with a baseball bat\". States she has a mammogram scheduled in May but would like for it to be checked out. Call back number is [phone].

## 2025-04-09 ENCOUNTER — OFFICE VISIT (OUTPATIENT)
Age: 70
End: 2025-04-09

## 2025-04-09 VITALS
RESPIRATION RATE: 21 BRPM | SYSTOLIC BLOOD PRESSURE: 129 MMHG | HEIGHT: 65 IN | TEMPERATURE: 97.8 F | BODY MASS INDEX: 24.62 KG/M2 | OXYGEN SATURATION: 99 % | DIASTOLIC BLOOD PRESSURE: 80 MMHG | HEART RATE: 68 BPM | WEIGHT: 147.8 LBS

## 2025-04-09 DIAGNOSIS — Z98.890 HISTORY OF LUMPECTOMY OF RIGHT BREAST: Primary | ICD-10-CM

## 2025-04-09 ASSESSMENT — PATIENT HEALTH QUESTIONNAIRE - PHQ9
SUM OF ALL RESPONSES TO PHQ QUESTIONS 1-9: 0
SUM OF ALL RESPONSES TO PHQ QUESTIONS 1-9: 0
2. FEELING DOWN, DEPRESSED OR HOPELESS: NOT AT ALL
SUM OF ALL RESPONSES TO PHQ QUESTIONS 1-9: 0
1. LITTLE INTEREST OR PLEASURE IN DOING THINGS: NOT AT ALL
SUM OF ALL RESPONSES TO PHQ QUESTIONS 1-9: 0

## 2025-04-09 NOTE — PROGRESS NOTES
Identified patient with two patient identifiers (name and ). Reviewed chart in preparation for visit and have obtained necessary documentation.    Monica Culp is a 69 y.o. female  Chief Complaint   Patient presents with    New Patient    Breast Pain     /80 (BP Site: Left Upper Arm, Patient Position: Sitting, BP Cuff Size: Small Adult)   Pulse 68   Temp 97.8 °F (36.6 °C) (Oral)   Resp 21   Ht 1.651 m (5' 5\")   Wt 67 kg (147 lb 12.8 oz)   SpO2 99%   BMI 24.60 kg/m²     1. Have you been to the ER, urgent care clinic since your last visit?  Hospitalized since your last visit?no    2. Have you seen or consulted any other health care providers outside of the [facility] since your last visit?  Include any pap smears or colon screening. no

## 2025-04-11 NOTE — PROGRESS NOTES
Surgery History and Physical    Subjective:      Monica Culp  is a 69 y.o.   female who presents with some pain in the right chest area now 4 years post lumpectomy, TH therapy and radiation for a .T2N0 ER+Her+  Right sided breast cancer. The discomfort seems to be in the latissimus muscle and the surrounding soft tissues.  She also has occasional shooting pains in her left breast that seem to go right to the nipple. Short duration and not frequent.    Past Medical History:   Diagnosis Date    Anxiety 2000    BRCA1 negative     BRCA2 negative     Breast cancer (HCC) 2021    Right    Cancer (HCC)     cervical and colon    Cervical cancer (HCC)     Colon cancer (HCC)     History of therapeutic radiation     Hx antineoplastic chemo     Radiation therapy complication        Past Surgical History:   Procedure Laterality Date    BREAST BIOPSY Right     COLONOSCOPY N/A 2023    COLONOSCOPY performed by Nupur Lawson MD at [facility] ENDOSCOPY    COLONOSCOPY      UPPER GASTROINTESTINAL ENDOSCOPY         Social History     Tobacco Use    Smoking status: Former     Current packs/day: 0.00     Average packs/day: 0.3 packs/day for 10.0 years (2.5 ttl pk-yrs)     Types: Cigarettes     Start date: 10/30/1965     Quit date: 10/30/1975     Years since quittin.4     Passive exposure: Never    Smokeless tobacco: Never    Tobacco comments:     Social smoker..not heavy smoker   Substance Use Topics    Alcohol use: Not Currently     Comment: Last alcohol 2000       Family History   Problem Relation Age of Onset    Heart Disease Mother     Breast Cancer Mother 75    Cancer Mother     Melanoma Father     Cancer Father     Breast Cancer Sister 50    Hypertension Sister     Prostate Cancer Brother     Colon Cancer Brother     Prostate Cancer Brother        Current Outpatient Medications on File Prior to Visit   Medication Sig Dispense Refill    atorvastatin (LIPITOR) 10 MG tablet Take 1 tablet by

## 2025-05-27 ENCOUNTER — HOSPITAL ENCOUNTER (OUTPATIENT)
Facility: HOSPITAL | Age: 70
Discharge: HOME | End: 2025-05-30
Payer: MEDICARE

## 2025-05-27 VITALS — WEIGHT: 146 LBS | HEIGHT: 66 IN | BODY MASS INDEX: 23.46 KG/M2

## 2025-05-27 DIAGNOSIS — Z17.0 MALIGNANT NEOPLASM OF OVERLAPPING SITES OF RIGHT BREAST IN FEMALE, ESTROGEN RECEPTOR POSITIVE (HCC): ICD-10-CM

## 2025-05-27 DIAGNOSIS — Z85.3 PERSONAL HISTORY OF BREAST CANCER: ICD-10-CM

## 2025-05-27 DIAGNOSIS — C50.811 MALIGNANT NEOPLASM OF OVERLAPPING SITES OF RIGHT BREAST IN FEMALE, ESTROGEN RECEPTOR POSITIVE (HCC): ICD-10-CM

## 2025-05-27 PROCEDURE — G0279 TOMOSYNTHESIS, MAMMO: HCPCS

## 2025-08-28 ENCOUNTER — ANESTHESIA EVENT (OUTPATIENT)
Facility: HOSPITAL | Age: 70
End: 2025-08-28
Payer: MEDICARE

## 2025-08-28 ENCOUNTER — ANESTHESIA (OUTPATIENT)
Facility: HOSPITAL | Age: 70
End: 2025-08-28
Payer: MEDICARE

## 2025-08-28 ENCOUNTER — HOSPITAL ENCOUNTER (OUTPATIENT)
Facility: HOSPITAL | Age: 70
Setting detail: OUTPATIENT SURGERY
Discharge: HOME | End: 2025-08-28
Attending: INTERNAL MEDICINE | Admitting: INTERNAL MEDICINE
Payer: MEDICARE

## 2025-08-28 VITALS
DIASTOLIC BLOOD PRESSURE: 67 MMHG | BODY MASS INDEX: 23.82 KG/M2 | TEMPERATURE: 97.8 F | SYSTOLIC BLOOD PRESSURE: 135 MMHG | RESPIRATION RATE: 17 BRPM | OXYGEN SATURATION: 97 % | WEIGHT: 143 LBS | HEART RATE: 67 BPM | HEIGHT: 65 IN

## 2025-08-28 PROCEDURE — 7100000011 HC PHASE II RECOVERY - ADDTL 15 MIN: Performed by: INTERNAL MEDICINE

## 2025-08-28 PROCEDURE — 3700000000 HC ANESTHESIA ATTENDED CARE: Performed by: INTERNAL MEDICINE

## 2025-08-28 PROCEDURE — 2709999900 HC NON-CHARGEABLE SUPPLY: Performed by: INTERNAL MEDICINE

## 2025-08-28 PROCEDURE — 3600007502: Performed by: INTERNAL MEDICINE

## 2025-08-28 PROCEDURE — 3700000001 HC ADD 15 MINUTES (ANESTHESIA): Performed by: INTERNAL MEDICINE

## 2025-08-28 PROCEDURE — 7100000010 HC PHASE II RECOVERY - FIRST 15 MIN: Performed by: INTERNAL MEDICINE

## 2025-08-28 PROCEDURE — 3600007512: Performed by: INTERNAL MEDICINE

## 2025-08-28 PROCEDURE — 88305 TISSUE EXAM BY PATHOLOGIST: CPT

## 2025-08-28 RX ORDER — SODIUM CHLORIDE 9 MG/ML
INJECTION, SOLUTION INTRAVENOUS CONTINUOUS
Status: CANCELLED | OUTPATIENT
Start: 2025-08-28

## 2025-08-28 RX ORDER — SODIUM CHLORIDE 9 MG/ML
INJECTION, SOLUTION INTRAVENOUS
Status: DISCONTINUED | OUTPATIENT
Start: 2025-08-28 | End: 2025-08-28 | Stop reason: SDUPTHER

## 2025-08-28 RX ORDER — LIDOCAINE HYDROCHLORIDE 20 MG/ML
INJECTION, SOLUTION EPIDURAL; INFILTRATION; INTRACAUDAL; PERINEURAL
Status: DISCONTINUED | OUTPATIENT
Start: 2025-08-28 | End: 2025-08-28 | Stop reason: SDUPTHER

## 2025-08-28 RX ORDER — SODIUM CHLORIDE 0.9 % (FLUSH) 0.9 %
5-40 SYRINGE (ML) INJECTION PRN
Status: CANCELLED | OUTPATIENT
Start: 2025-08-28

## 2025-08-28 RX ORDER — SODIUM CHLORIDE 9 MG/ML
INJECTION, SOLUTION INTRAVENOUS PRN
Status: CANCELLED | OUTPATIENT
Start: 2025-08-28

## 2025-08-28 RX ORDER — SODIUM CHLORIDE 0.9 % (FLUSH) 0.9 %
5-40 SYRINGE (ML) INJECTION EVERY 12 HOURS SCHEDULED
Status: CANCELLED | OUTPATIENT
Start: 2025-08-28

## 2025-08-28 RX ADMIN — SODIUM CHLORIDE: 9 INJECTION, SOLUTION INTRAVENOUS at 10:15

## 2025-08-28 RX ADMIN — LIDOCAINE HYDROCHLORIDE 100 MG: 20 INJECTION, SOLUTION EPIDURAL; INFILTRATION; INTRACAUDAL; PERINEURAL at 10:22

## 2025-08-28 ASSESSMENT — PAIN SCALES - GENERAL
PAINLEVEL_OUTOF10: 0

## 2025-08-28 ASSESSMENT — ENCOUNTER SYMPTOMS: SHORTNESS OF BREATH: 1

## (undated) DEVICE — TRAP SURG QUAD PARABOLA SLOT DSGN SFTY SCRN TRAPEASE

## (undated) DEVICE — SNARE ENDOSCP L240CM SHTH DIA24MM LOOP W10MM POLYP RND REINF

## (undated) DEVICE — FORCEPS BX L240CM JAW DIA2.8MM L CAP W/ NDL MIC MESH TOOTH

## (undated) DEVICE — FORCEPS BX L240CM JAW DIA2.4MM ORNG L CAP W/ NDL DISP RAD

## (undated) DEVICE — TUBING HYDR IRR --

## (undated) DEVICE — SNARE VASC L240CM LOOP W10MM SHTH DIA2.4MM RND STIFF CLD